# Patient Record
Sex: FEMALE | Race: WHITE | Employment: OTHER | URBAN - METROPOLITAN AREA
[De-identification: names, ages, dates, MRNs, and addresses within clinical notes are randomized per-mention and may not be internally consistent; named-entity substitution may affect disease eponyms.]

---

## 2017-08-28 PROBLEM — I10 ESSENTIAL HYPERTENSION: Status: ACTIVE | Noted: 2017-08-28

## 2017-08-28 PROBLEM — E78.2 MIXED HYPERLIPIDEMIA: Status: ACTIVE | Noted: 2017-08-28

## 2017-08-28 PROBLEM — I25.10 CAD (CORONARY ARTERY DISEASE): Status: ACTIVE | Noted: 2017-08-28

## 2018-08-28 ENCOUNTER — OFFICE VISIT (OUTPATIENT)
Dept: CARDIOLOGY CLINIC | Age: 73
End: 2018-08-28
Payer: MEDICARE

## 2018-08-28 VITALS
WEIGHT: 232 LBS | HEART RATE: 89 BPM | HEIGHT: 66 IN | DIASTOLIC BLOOD PRESSURE: 68 MMHG | SYSTOLIC BLOOD PRESSURE: 132 MMHG | RESPIRATION RATE: 16 BRPM | BODY MASS INDEX: 37.28 KG/M2

## 2018-08-28 DIAGNOSIS — I25.10 CORONARY ARTERY DISEASE INVOLVING NATIVE CORONARY ARTERY OF NATIVE HEART WITHOUT ANGINA PECTORIS: Primary | ICD-10-CM

## 2018-08-28 PROCEDURE — G8598 ASA/ANTIPLAT THER USED: HCPCS | Performed by: INTERNAL MEDICINE

## 2018-08-28 PROCEDURE — 1123F ACP DISCUSS/DSCN MKR DOCD: CPT | Performed by: INTERNAL MEDICINE

## 2018-08-28 PROCEDURE — G8427 DOCREV CUR MEDS BY ELIG CLIN: HCPCS | Performed by: INTERNAL MEDICINE

## 2018-08-28 PROCEDURE — 1036F TOBACCO NON-USER: CPT | Performed by: INTERNAL MEDICINE

## 2018-08-28 PROCEDURE — 93000 ELECTROCARDIOGRAM COMPLETE: CPT | Performed by: INTERNAL MEDICINE

## 2018-08-28 PROCEDURE — 3017F COLORECTAL CA SCREEN DOC REV: CPT | Performed by: INTERNAL MEDICINE

## 2018-08-28 PROCEDURE — 1101F PT FALLS ASSESS-DOCD LE1/YR: CPT | Performed by: INTERNAL MEDICINE

## 2018-08-28 PROCEDURE — 4040F PNEUMOC VAC/ADMIN/RCVD: CPT | Performed by: INTERNAL MEDICINE

## 2018-08-28 PROCEDURE — G8417 CALC BMI ABV UP PARAM F/U: HCPCS | Performed by: INTERNAL MEDICINE

## 2018-08-28 PROCEDURE — G8400 PT W/DXA NO RESULTS DOC: HCPCS | Performed by: INTERNAL MEDICINE

## 2018-08-28 PROCEDURE — 1090F PRES/ABSN URINE INCON ASSESS: CPT | Performed by: INTERNAL MEDICINE

## 2018-08-28 PROCEDURE — 99214 OFFICE O/P EST MOD 30 MIN: CPT | Performed by: INTERNAL MEDICINE

## 2018-08-28 RX ORDER — PREDNISONE 1 MG/1
5 TABLET ORAL PRN
COMMUNITY

## 2018-08-28 RX ORDER — LEFLUNOMIDE 20 MG/1
20 TABLET ORAL
COMMUNITY
Start: 2018-06-15 | End: 2028-09-26

## 2018-08-28 RX ORDER — FOLIC ACID 1 MG/1
1 TABLET ORAL
COMMUNITY
Start: 2018-06-15 | End: 2028-09-26

## 2018-08-28 RX ORDER — ASPIRIN 325 MG
325 TABLET, DELAYED RELEASE (ENTERIC COATED) ORAL DAILY
COMMUNITY

## 2018-08-28 NOTE — PROGRESS NOTES
No current facility-administered medications for this visit. Allergies   Allergen Reactions    Ace Inhibitors      cough    Plavix [Clopidogrel] Hives       Vitals:    08/28/18 0831   BP: 132/68   Pulse: 89   Resp: 16   Weight: 232 lb (105.2 kg)   Height: 5' 6\" (1.676 m)       Social History     Social History    Marital status:      Spouse name: N/A    Number of children: N/A    Years of education: N/A     Occupational History    Not on file. Social History Main Topics    Smoking status: Never Smoker    Smokeless tobacco: Never Used    Alcohol use No    Drug use: No    Sexual activity: Not on file     Other Topics Concern    Not on file     Social History Narrative    No narrative on file       Family History   Problem Relation Age of Onset    Cancer Mother         Pancreas    Heart Disease Father     Heart Disease Sister     Stroke Brother     Other Brother         Alcohol    Heart Disease Sister          SUBJECTIVE: Nixon Blanco presents to the office today for re-evaluation of cardiac diagnoses. She complains of no symptoms and denies   chest pain, chest pressure/discomfort, claudication, dyspnea, exertional chest pressure/discomfort, fatigue, irregular heart beat, lower extremity edema, near-syncope, orthopnea, palpitations, paroxysmal nocturnal dyspnea, syncope and tachypnea. Very troubled by arthritis, was actually on prednisone. [place] rheum opinion placed her on arava. Much better  Compliant with meds. No bleeding        Review of Systems:   Heart: as above   Lungs: as above   Eyes: denies changes in vision or discharge. Ears: denies changes in hearing or pain. Nose: denies epistaxis or masses   Throat: denies sore throat or trouble swallowing. Neuro: denies numbness, tingling, tremors. Skin: denies rashes or itching. : denies hematuria, dysuria   GI: denies vomiting, diarrhea   Psych: denies mood changed, anxiety, depression.   all others

## 2019-09-26 ENCOUNTER — OFFICE VISIT (OUTPATIENT)
Dept: CARDIOLOGY CLINIC | Age: 74
End: 2019-09-26
Payer: MEDICARE

## 2019-09-26 VITALS
HEART RATE: 89 BPM | SYSTOLIC BLOOD PRESSURE: 126 MMHG | WEIGHT: 190 LBS | BODY MASS INDEX: 30.53 KG/M2 | HEIGHT: 66 IN | RESPIRATION RATE: 16 BRPM | DIASTOLIC BLOOD PRESSURE: 70 MMHG

## 2019-09-26 DIAGNOSIS — I25.10 CORONARY ARTERY DISEASE INVOLVING NATIVE CORONARY ARTERY OF NATIVE HEART WITHOUT ANGINA PECTORIS: Primary | ICD-10-CM

## 2019-09-26 PROCEDURE — G8427 DOCREV CUR MEDS BY ELIG CLIN: HCPCS | Performed by: INTERNAL MEDICINE

## 2019-09-26 PROCEDURE — 4040F PNEUMOC VAC/ADMIN/RCVD: CPT | Performed by: INTERNAL MEDICINE

## 2019-09-26 PROCEDURE — 1123F ACP DISCUSS/DSCN MKR DOCD: CPT | Performed by: INTERNAL MEDICINE

## 2019-09-26 PROCEDURE — 1036F TOBACCO NON-USER: CPT | Performed by: INTERNAL MEDICINE

## 2019-09-26 PROCEDURE — 1090F PRES/ABSN URINE INCON ASSESS: CPT | Performed by: INTERNAL MEDICINE

## 2019-09-26 PROCEDURE — G8598 ASA/ANTIPLAT THER USED: HCPCS | Performed by: INTERNAL MEDICINE

## 2019-09-26 PROCEDURE — G8417 CALC BMI ABV UP PARAM F/U: HCPCS | Performed by: INTERNAL MEDICINE

## 2019-09-26 PROCEDURE — 93000 ELECTROCARDIOGRAM COMPLETE: CPT | Performed by: INTERNAL MEDICINE

## 2019-09-26 PROCEDURE — 99213 OFFICE O/P EST LOW 20 MIN: CPT | Performed by: INTERNAL MEDICINE

## 2019-09-26 PROCEDURE — 3017F COLORECTAL CA SCREEN DOC REV: CPT | Performed by: INTERNAL MEDICINE

## 2019-09-26 PROCEDURE — G8400 PT W/DXA NO RESULTS DOC: HCPCS | Performed by: INTERNAL MEDICINE

## 2019-09-26 NOTE — PROGRESS NOTES
re-evaluation of cardiac diagnoses. She complains of no symptoms and denies   chest pain, chest pressure/discomfort, claudication, dyspnea, exertional chest pressure/discomfort, fatigue, irregular heart beat, lower extremity edema, near-syncope, orthopnea, palpitations, paroxysmal nocturnal dyspnea, syncope and tachypnea. [place] rheum opinion placed her on arava for her RA . Much better  Compliant with meds. No bleeding        Review of Systems:   Heart: as above   Lungs: as above   Eyes: denies changes in vision or discharge. Ears: denies changes in hearing or pain. Nose: denies epistaxis or masses   Throat: denies sore throat or trouble swallowing. Neuro: denies numbness, tingling, tremors. Skin: denies rashes or itching. : denies hematuria, dysuria   GI: denies vomiting, diarrhea   Psych: denies mood changed, anxiety, depression. all others negative. Physical Exam   /70   Pulse 89   Resp 16   Ht 5' 6\" (1.676 m)   Wt 190 lb (86.2 kg)   BMI 30.67 kg/m²   Constitutional: Oriented to person, place, and time. Well-developed and well-nourished. No distress. Head: Normocephalic and atraumatic. Eyes: EOM are normal. Pupils are equal, round, and reactive to light. Neck: Normal range of motion. Neck supple. No hepatojugular reflux and no JVD present. Carotid bruit is not present. No tracheal deviation present. No thyromegaly present. Cardiovascular: Normal rate, regular rhythm, normal heart sounds and intact distal pulses. Exam reveals no gallop and no friction rub. No murmur heard. Pulmonary/Chest: Effort normal and breath sounds normal. No respiratory distress. No wheezes. No rales. No tenderness. Abdominal: Soft. Bowel sounds are normal. No distension and no mass. No tenderness. No rebound and no guarding. Musculoskeletal: Normal range of motion. No edema and no tenderness. Lymphadenopathy:   No cervical adenopathy. No groin adenopathy.   Neurological: Alert and

## 2020-01-21 ENCOUNTER — TELEPHONE (OUTPATIENT)
Dept: ADMINISTRATIVE | Age: 75
End: 2020-01-21

## 2020-01-22 NOTE — TELEPHONE ENCOUNTER
LVM requesting patient call me back-advised in VM that apt is no longer available. When patient calls back will offer 1/23/2020 @ 1130 - time held while waiting for call back.

## 2020-02-12 ENCOUNTER — OFFICE VISIT (OUTPATIENT)
Dept: ENT CLINIC | Age: 75
End: 2020-02-12
Payer: MEDICARE

## 2020-02-12 ENCOUNTER — PROCEDURE VISIT (OUTPATIENT)
Dept: AUDIOLOGY | Age: 75
End: 2020-02-12
Payer: MEDICARE

## 2020-02-12 VITALS
HEIGHT: 66 IN | DIASTOLIC BLOOD PRESSURE: 67 MMHG | BODY MASS INDEX: 28.93 KG/M2 | SYSTOLIC BLOOD PRESSURE: 119 MMHG | HEART RATE: 80 BPM | OXYGEN SATURATION: 100 % | WEIGHT: 180 LBS

## 2020-02-12 PROCEDURE — G8400 PT W/DXA NO RESULTS DOC: HCPCS | Performed by: OTOLARYNGOLOGY

## 2020-02-12 PROCEDURE — G8417 CALC BMI ABV UP PARAM F/U: HCPCS | Performed by: OTOLARYNGOLOGY

## 2020-02-12 PROCEDURE — 3017F COLORECTAL CA SCREEN DOC REV: CPT | Performed by: OTOLARYNGOLOGY

## 2020-02-12 PROCEDURE — 92557 COMPREHENSIVE HEARING TEST: CPT | Performed by: AUDIOLOGIST

## 2020-02-12 PROCEDURE — G8484 FLU IMMUNIZE NO ADMIN: HCPCS | Performed by: OTOLARYNGOLOGY

## 2020-02-12 PROCEDURE — 1090F PRES/ABSN URINE INCON ASSESS: CPT | Performed by: OTOLARYNGOLOGY

## 2020-02-12 PROCEDURE — 92567 TYMPANOMETRY: CPT | Performed by: AUDIOLOGIST

## 2020-02-12 PROCEDURE — G8427 DOCREV CUR MEDS BY ELIG CLIN: HCPCS | Performed by: OTOLARYNGOLOGY

## 2020-02-12 PROCEDURE — 99204 OFFICE O/P NEW MOD 45 MIN: CPT | Performed by: OTOLARYNGOLOGY

## 2020-02-12 PROCEDURE — 1123F ACP DISCUSS/DSCN MKR DOCD: CPT | Performed by: OTOLARYNGOLOGY

## 2020-02-12 PROCEDURE — 1036F TOBACCO NON-USER: CPT | Performed by: OTOLARYNGOLOGY

## 2020-02-12 PROCEDURE — 4040F PNEUMOC VAC/ADMIN/RCVD: CPT | Performed by: OTOLARYNGOLOGY

## 2020-02-12 ASSESSMENT — ENCOUNTER SYMPTOMS
ABDOMINAL PAIN: 0
COLOR CHANGE: 0
SHORTNESS OF BREATH: 0
GASTROINTESTINAL NEGATIVE: 1
EYES NEGATIVE: 1
RESPIRATORY NEGATIVE: 1

## 2020-02-12 NOTE — PROGRESS NOTES
This patient was referred for audiometric/tympanometric testing by Dr. Barabara Leyden due to bilateral hearing loss. Patient denied tinnitus. Patient denied any ear pain, pressure, and drainage. Audiometry revealed hearing sensitivity within normal limits through 4000 Hz sloping to a moderate sensorineural hearing loss, on the right and hearing sensitivity within normal limits through 4000 Hz sloping to a moderately severe sensorineural hearing loss, with a mild notch at 2000 Hz, on the left. Reliability was good. Speech reception thresholds were in good agreement with the pure tone averages, bilaterally. Speech discrimination scores were excellent, bilaterally. Tympanometry revealed shallow tympanograms, bilaterally. The results were reviewed with the patient and physician. Recommendations for follow up will be made pending physician consult. Wyatt Stahl. Daisha 10 Year Audiology Extern    I have discussed the case, including pertinent history and exam findings with the audiology extern. I have seen and examined the patient and the key elements of the encounter have been performed by me. I agree with the assessment and plan as documented by the audiology extern.       Neelima Benavidez/CCC-A  [place] Lic # [id number]

## 2020-02-12 NOTE — PROGRESS NOTES
Subjective:      Patient ID:  Nighat Saini is a 69 y.o. female. HPI:    Cerumen Impaction  Patient presents with diminished hearing both ears for the past 6 month. There is not a prior history of cerumen impaction. The patient was not using ear drops to loosen wax immediately prior to this visit. Hearing Loss  Patient presents today with complaints of hearing loss. Concern regarding hearing has been present for 3 months. She has not failed a prior hearing test.  The patient reports difficulty hearing, turning up the T.V..           Patient's medications, allergies, past medical, surgical, social and family histories were reviewed and updated as appropriate. Review of Systems   Constitutional: Negative. HENT: Positive for hearing loss. Eyes: Negative. Negative for visual disturbance. Respiratory: Negative. Negative for shortness of breath. Cardiovascular: Negative. Negative for chest pain. Gastrointestinal: Negative. Negative for abdominal pain. Genitourinary: Negative. Musculoskeletal: Negative. Skin: Negative. Negative for color change. Neurological: Negative. Psychiatric/Behavioral: Negative. Negative for behavioral problems and hallucinations. All other systems reviewed and are negative. Objective:   Physical Exam  Vitals signs and nursing note reviewed. Constitutional:       Appearance: She is well-developed. HENT:      Head: Normocephalic and atraumatic. Right Ear: Tympanic membrane, ear canal and external ear normal. Decreased hearing noted. Left Ear: Tympanic membrane, ear canal and external ear normal. Decreased hearing noted. Nose: Nose normal.      Mouth/Throat:      Pharynx: Uvula midline. Eyes:      Conjunctiva/sclera: Conjunctivae normal.      Pupils: Pupils are equal, round, and reactive to light. Neck:      Musculoskeletal: Normal range of motion and neck supple.    Cardiovascular:      Rate and Rhythm: Normal rate and regular rhythm. Heart sounds: Normal heart sounds. Pulmonary:      Effort: Pulmonary effort is normal.      Breath sounds: Normal breath sounds. Abdominal:      General: Bowel sounds are normal.      Palpations: Abdomen is soft. Skin:     General: Skin is warm and dry. Neurological:      Mental Status: She is alert and oriented to person, place, and time. Audio:                       Assessment:       Diagnosis Orders   1. Bilateral impacted cerumen     2. Bilateral high frequency sensorineural hearing loss                Plan:      Hearing is normal.    Pt does not have hearing aids at this time. Pt is not a candidate for hearing aids and is not interested in discussing this with audiology. Also discussed the importance of hearing protection from now on to preserve remaining hearing.      Call or return to clinic prn if these symptoms worsen or fail to improve as anticipated      Follow up prn

## 2020-05-26 ENCOUNTER — PREP FOR PROCEDURE (OUTPATIENT)
Dept: SURGERY | Age: 75
End: 2020-05-26

## 2020-05-26 RX ORDER — SODIUM CHLORIDE 0.9 % (FLUSH) 0.9 %
10 SYRINGE (ML) INJECTION PRN
Status: CANCELLED | OUTPATIENT
Start: 2020-05-26

## 2020-05-26 RX ORDER — SODIUM CHLORIDE 9 MG/ML
INJECTION, SOLUTION INTRAVENOUS CONTINUOUS
Status: CANCELLED | OUTPATIENT
Start: 2020-05-26

## 2020-07-17 ENCOUNTER — HOSPITAL ENCOUNTER (OUTPATIENT)
Age: 75
Discharge: HOME | End: 2020-07-19
Payer: MEDICARE

## 2020-07-17 PROCEDURE — U0003 INFECTIOUS AGENT DETECTION BY NUCLEIC ACID (DNA OR RNA); SEVERE ACUTE RESPIRATORY SYNDROME CORONAVIRUS 2 (SARS-COV-2) (CORONAVIRUS DISEASE [COVID-19]), AMPLIFIED PROBE TECHNIQUE, MAKING USE OF HIGH THROUGHPUT TECHNOLOGIES AS DESCRIBED BY CMS-2020-01-R: HCPCS

## 2020-07-19 LAB
SARS-COV-2: NOT DETECTED
SOURCE: NORMAL

## 2020-07-23 ENCOUNTER — ANESTHESIA (OUTPATIENT)
Dept: ENDOSCOPY | Age: 75
End: 2020-07-23
Payer: MEDICARE

## 2020-07-23 ENCOUNTER — HOSPITAL ENCOUNTER (OUTPATIENT)
Age: 75
Setting detail: OUTPATIENT SURGERY
Discharge: HOME | End: 2020-07-23
Attending: SURGERY | Admitting: SURGERY
Payer: MEDICARE

## 2020-07-23 ENCOUNTER — ANESTHESIA EVENT (OUTPATIENT)
Dept: ENDOSCOPY | Age: 75
End: 2020-07-23
Payer: MEDICARE

## 2020-07-23 VITALS
RESPIRATION RATE: 17 BRPM | SYSTOLIC BLOOD PRESSURE: 84 MMHG | DIASTOLIC BLOOD PRESSURE: 54 MMHG | OXYGEN SATURATION: 98 %

## 2020-07-23 VITALS
OXYGEN SATURATION: 100 % | TEMPERATURE: 98 F | HEIGHT: 66 IN | HEART RATE: 83 BPM | SYSTOLIC BLOOD PRESSURE: 123 MMHG | RESPIRATION RATE: 21 BRPM | BODY MASS INDEX: 29.73 KG/M2 | DIASTOLIC BLOOD PRESSURE: 60 MMHG | WEIGHT: 185 LBS

## 2020-07-23 PROCEDURE — 7100000011 HC PHASE II RECOVERY - ADDTL 15 MIN: Performed by: SURGERY

## 2020-07-23 PROCEDURE — 3700000001 HC ADD 15 MINUTES (ANESTHESIA): Performed by: SURGERY

## 2020-07-23 PROCEDURE — 2709999900 HC NON-CHARGEABLE SUPPLY: Performed by: SURGERY

## 2020-07-23 PROCEDURE — 6360000002 HC RX W HCPCS

## 2020-07-23 PROCEDURE — 7100000010 HC PHASE II RECOVERY - FIRST 15 MIN: Performed by: SURGERY

## 2020-07-23 PROCEDURE — 2580000003 HC RX 258: Performed by: SURGERY

## 2020-07-23 PROCEDURE — 3700000000 HC ANESTHESIA ATTENDED CARE: Performed by: SURGERY

## 2020-07-23 PROCEDURE — 2500000003 HC RX 250 WO HCPCS

## 2020-07-23 PROCEDURE — 3609027000 HC COLONOSCOPY: Performed by: SURGERY

## 2020-07-23 RX ORDER — PROPOFOL 10 MG/ML
INJECTION, EMULSION INTRAVENOUS PRN
Status: DISCONTINUED | OUTPATIENT
Start: 2020-07-23 | End: 2020-07-23 | Stop reason: SDUPTHER

## 2020-07-23 RX ORDER — SODIUM CHLORIDE 0.9 % (FLUSH) 0.9 %
10 SYRINGE (ML) INJECTION PRN
Status: DISCONTINUED | OUTPATIENT
Start: 2020-07-23 | End: 2020-07-23 | Stop reason: HOSPADM

## 2020-07-23 RX ORDER — SODIUM CHLORIDE 9 MG/ML
INJECTION, SOLUTION INTRAVENOUS CONTINUOUS
Status: DISCONTINUED | OUTPATIENT
Start: 2020-07-23 | End: 2020-07-23 | Stop reason: HOSPADM

## 2020-07-23 RX ORDER — ALFENTANIL HYDROCHLORIDE 500 UG/ML
INJECTION INTRAVENOUS PRN
Status: DISCONTINUED | OUTPATIENT
Start: 2020-07-23 | End: 2020-07-23 | Stop reason: SDUPTHER

## 2020-07-23 RX ADMIN — PROPOFOL 20 MG: 10 INJECTION, EMULSION INTRAVENOUS at 08:23

## 2020-07-23 RX ADMIN — SODIUM CHLORIDE: 9 INJECTION, SOLUTION INTRAVENOUS at 08:12

## 2020-07-23 RX ADMIN — PROPOFOL 20 MG: 10 INJECTION, EMULSION INTRAVENOUS at 08:18

## 2020-07-23 RX ADMIN — PROPOFOL 50 MG: 10 INJECTION, EMULSION INTRAVENOUS at 08:14

## 2020-07-23 RX ADMIN — ALFENTANIL HYDROCHLORIDE 400 MCG: 500 INJECTION INTRAVENOUS at 08:12

## 2020-07-23 ASSESSMENT — PAIN SCALES - GENERAL
PAINLEVEL_OUTOF10: 0

## 2020-07-23 ASSESSMENT — PAIN DESCRIPTION - LOCATION: LOCATION: ABDOMEN

## 2020-07-23 ASSESSMENT — PAIN - FUNCTIONAL ASSESSMENT: PAIN_FUNCTIONAL_ASSESSMENT: 0-10

## 2020-07-23 NOTE — ANESTHESIA PRE PROCEDURE
Department of Anesthesiology  Preprocedure Note       Name:  Miriam Haskins   Age:  69 y.o.  :  1945                                          MRN:  [id number]         Date:  2020      Surgeon: Camryn Lozoya):  Charlie Moore MD    Procedure: Procedure(s):  COLORECTAL CANCER SCREENING, NOT HIGH RISK    Medications prior to admission:   Prior to Admission medications    Medication Sig Start Date End Date Taking?  Authorizing Provider   David Ivann Oil (MAXIMUM RED KRILL PO) Take by mouth STOP PREOP MED    Historical Provider, MD   Calcium Carbonate-Vitamin D (CALCIUM 600+D PO) Take by mouth STOP PREOP MED    Historical Provider, MD   Cetirizine HCl (ZYRTEC PO) Take by mouth every evening    Historical Provider, MD   aspirin 325 MG EC tablet Take 325 mg by mouth daily STOP PREOP MED per dr Karl Mccabe Provider, MD   leflunomide (ARAVA) 10 MG tablet Take 20 mg by mouth three times a week STOP PREOP MED 6/15/18 9/26/28  Historical Provider, MD   predniSONE (DELTASONE) 10 MG tablet Take 5 mg by mouth four times a week     Historical Provider, MD   folic acid (FOLVITE) 1 MG tablet Take 1 mg by mouth 6/15/18 9/26/28  Historical Provider, MD   losartan (COZAAR) 50 MG tablet Take 50 mg by mouth every evening  17   Historical Provider, MD   rosuvastatin (CRESTOR) 10 MG tablet Take 1 tablet by mouth nightly 16   Lucia Almendarez MD   levothyroxine (SYNTHROID) 25 MCG tablet Take 25 mcg by mouth Daily Indications: every other day    Historical Provider, MD   ferrous sulfate 325 (65 FE) MG tablet Take 325 mg by mouth 2 times daily     Historical Provider, MD   vitamin D (CHOLECALCIFEROL) 59657 UNIT CAPS Take 50,000 Units by mouth every 14 days Indications: every 2 weeks     Historical Provider, MD   metoprolol (TOPROL-XL) 50 MG XL tablet Take 50 mg by mouth daily    Historical Provider, MD   Coenzyme Q10 (CO Q-10) 400 MG CAPS Take 400 mg by mouth    Historical Provider, MD   Multiple Vitamins-Minerals (CENTRUM SILVER PO) Take 1 tablet by mouth    Historical Provider, MD   Multiple Vitamins-Minerals (OCUVITE PO) Take 1 tablet by mouth    Historical Provider, MD       Current medications:    No current facility-administered medications for this visit. No current outpatient medications on file. Facility-Administered Medications Ordered in Other Visits   Medication Dose Route Frequency Provider Last Rate Last Dose    0.9 % sodium chloride infusion   Intravenous Continuous Hudson Santamaria MD   Stopped at 07/23/20 0831    sodium chloride flush 0.9 % injection 10 mL  10 mL Intravenous PRN Hudson Santamaria MD           Allergies: Allergies   Allergen Reactions    Plavix [Clopidogrel] Hives    Ace Inhibitors      cough       Problem List:    Patient Active Problem List   Diagnosis Code    CAD (coronary artery disease) I25.10    Essential hypertension I10    Mixed hyperlipidemia E78.2       Past Medical History:        Diagnosis Date    CAD (coronary artery disease)     Colon polyps     Osteoarthritis     Thyroid disease        Past Surgical History:        Procedure Laterality Date    ABDOMEN SURGERY      CARDIAC SURGERY      COLONOSCOPY      CORONARY ANGIOPLASTY WITH STENT PLACEMENT  06/03/2016    Dr. Palmer Collier - 3.5x38 [place] ROSAMARIA to Prox RCA       Social History:    Social History     Tobacco Use    Smoking status: Never Smoker    Smokeless tobacco: Never Used   Substance Use Topics    Alcohol use: No                                Counseling given: Not Answered      Vital Signs (Current): There were no vitals filed for this visit.                                            BP Readings from Last 3 Encounters:   07/23/20 102/61   07/23/20 (!) 84/54   02/12/20 119/67       NPO Status:                                                                                 BMI:   Wt Readings from Last 3 Encounters:   07/23/20 185 lb (83.9 kg)   02/12/20 180 lb (81.6 kg)   09/26/19 190 lb (86.2 kg) There is no height or weight on file to calculate BMI.    CBC:   Lab Results   Component Value Date    WBC 8.0 06/02/2016    RBC 4.53 06/02/2016    HGB 12.3 06/02/2016    HCT 37.8 06/02/2016    MCV 83.4 06/02/2016    RDW 15.2 06/02/2016     06/02/2016       CMP:   Lab Results   Component Value Date     06/04/2016    K 3.8 06/04/2016     06/04/2016    CO2 27 06/04/2016    BUN 16 06/04/2016    CREATININE 1.0 06/04/2016    GFRAA >60 06/04/2016    LABGLOM 55 06/04/2016    GLUCOSE 118 06/04/2016    PROT 7.3 06/02/2016    CALCIUM 9.1 06/04/2016    BILITOT 0.4 06/02/2016    ALKPHOS 69 06/02/2016    AST 30 06/02/2016    ALT 23 06/02/2016       POC Tests: No results for input(s): POCGLU, POCNA, POCK, POCCL, POCBUN, POCHEMO, POCHCT in the last 72 hours. Coags:   Lab Results   Component Value Date    PROTIME 14.0 06/02/2016    INR 1.3 06/02/2016    APTT 30.5 06/02/2016       HCG (If Applicable): No results found for: PREGTESTUR, PREGSERUM, HCG, HCGQUANT     ABGs: No results found for: PHART, PO2ART, EEH0VEB, WOI3SIK, BEART, E1GFWUBA     Type & Screen (If Applicable):  No results found for: LABABO, LABRH    Drug/Infectious Status (If Applicable):  No results found for: HIV, HEPCAB    COVID-19 Screening (If Applicable):   Lab Results   Component Value Date    COVID19 Not Detected 07/17/2020         Anesthesia Evaluation  Patient summary reviewed  Airway: Mallampati: Unable to assess / NA        Dental:          Pulmonary:Negative Pulmonary ROS and normal exam  breath sounds clear to auscultation                             Cardiovascular:    (+) hypertension:, CAD:, CABG/stent (Stent.):,       ECG reviewed  Rhythm: regular  Rate: normal           Beta Blocker:  Not on Beta Blocker      ROS comment: EKG: Normal Sinus Rhythm 89.     CATH:  Post op diagnosis:  NSTEMI  PCI OF RCA  PATENT LAD STENT     Plan:  DAPT - ALLERGIC TO PLAVIX BY REPORT  CLAIMS INTOLERANCE TO MULTIPLE STATINS     Neuro/Psych:   Negative Neuro/Psych ROS              GI/Hepatic/Renal:            ROS comment: Colon polyps. .   Endo/Other:    (+) hypothyroidism: arthritis: OA., .                 Abdominal:           Vascular: negative vascular ROS. Anesthesia Plan      MAC     ASA 3       Induction: intravenous. Anesthetic plan and risks discussed with patient. Plan discussed with CRNA.     Attending anesthesiologist reviewed and agrees with Lucy Roque MD   7/23/2020

## 2020-07-23 NOTE — ANESTHESIA PRE PROCEDURE
Department of Anesthesiology  Preprocedure Note       Name:  Sumeet Sadler   Age:  69 y.o.  :  1945                                          MRN:  [id number]         Date:  2020      Surgeon: Kristian Dempsey):  Ya Ponce MD    Procedure: Procedure(s):  COLORECTAL CANCER SCREENING, NOT HIGH RISK    Medications prior to admission:   Prior to Admission medications    Medication Sig Start Date End Date Taking?  Authorizing Provider   Kiara Oka Oil (MAXIMUM RED KRILL PO) Take by mouth STOP PREOP MED   Yes Historical Provider, MD   Calcium Carbonate-Vitamin D (CALCIUM 600+D PO) Take by mouth STOP PREOP MED   Yes Historical Provider, MD   Cetirizine HCl (ZYRTEC PO) Take by mouth every evening   Yes Historical Provider, MD   aspirin 325 MG EC tablet Take 325 mg by mouth daily STOP PREOP MED per dr Tammy Sofia   Yes Historical Provider, MD   leflunomide (ARAVA) 10 MG tablet Take 20 mg by mouth three times a week STOP PREOP MED 6/15/18 9/26/28 Yes Historical Provider, MD   predniSONE (DELTASONE) 10 MG tablet Take 5 mg by mouth four times a week    Yes Historical Provider, MD   folic acid (FOLVITE) 1 MG tablet Take 1 mg by mouth 6/15/18 9/26/28 Yes Historical Provider, MD   losartan (COZAAR) 50 MG tablet Take 50 mg by mouth every evening  17  Yes Historical Provider, MD   rosuvastatin (CRESTOR) 10 MG tablet Take 1 tablet by mouth nightly 16  Yes Lorna Barton MD   levothyroxine (SYNTHROID) 25 MCG tablet Take 25 mcg by mouth Daily Indications: every other day   Yes Historical Provider, MD   ferrous sulfate 325 (65 FE) MG tablet Take 325 mg by mouth 2 times daily    Yes Historical Provider, MD   vitamin D (CHOLECALCIFEROL) 43934 UNIT CAPS Take 50,000 Units by mouth every 14 days Indications: every 2 weeks    Yes Historical Provider, MD   metoprolol (TOPROL-XL) 50 MG XL tablet Take 50 mg by mouth daily   Yes Historical Provider, MD   Coenzyme Q10 (CO Q-10) 400 MG CAPS Take 400 mg by mouth   Yes Historical Provider, MD   Multiple Vitamins-Minerals (CENTRUM SILVER PO) Take 1 tablet by mouth   Yes Historical Provider, MD   Multiple Vitamins-Minerals (OCUVITE PO) Take 1 tablet by mouth   Yes Historical Provider, MD       Current medications:    Current Facility-Administered Medications   Medication Dose Route Frequency Provider Last Rate Last Dose    0.9 % sodium chloride infusion   Intravenous Continuous Raad Hernandez MD        sodium chloride flush 0.9 % injection 10 mL  10 mL Intravenous PRN Raad Hernandez MD           Allergies: Allergies   Allergen Reactions    Plavix [Clopidogrel] Hives    Ace Inhibitors      cough       Problem List:    Patient Active Problem List   Diagnosis Code    CAD (coronary artery disease) I25.10    Essential hypertension I10    Mixed hyperlipidemia E78.2       Past Medical History:        Diagnosis Date    CAD (coronary artery disease)     Colon polyps     Osteoarthritis     Thyroid disease        Past Surgical History:        Procedure Laterality Date    ABDOMEN SURGERY      CARDIAC SURGERY      COLONOSCOPY      CORONARY ANGIOPLASTY WITH STENT PLACEMENT  06/03/2016    Dr. Lore Aragon - 3.5x38 [place] ROSAMARIA to Prox RCA       Social History:    Social History     Tobacco Use    Smoking status: Never Smoker    Smokeless tobacco: Never Used   Substance Use Topics    Alcohol use:  No                                Counseling given: Not Answered      Vital Signs (Current):   Vitals:    07/15/20 1233 07/23/20 0742   BP:  129/63   Pulse:  93   Resp:  20   Temp:  97.4 °F (36.3 °C)   TempSrc:  Temporal   SpO2:  100%   Weight: 185 lb (83.9 kg) 185 lb (83.9 kg)   Height: 5' 6\" (1.676 m) 5' 6\" (1.676 m)                                              BP Readings from Last 3 Encounters:   07/23/20 129/63   02/12/20 119/67   09/26/19 126/70       NPO Status:                                                                                 BMI:   Wt Readings from Last 3 Encounters: 07/23/20 185 lb (83.9 kg)   02/12/20 180 lb (81.6 kg)   09/26/19 190 lb (86.2 kg)     Body mass index is 29.86 kg/m². CBC:   Lab Results   Component Value Date    WBC 8.0 06/02/2016    RBC 4.53 06/02/2016    HGB 12.3 06/02/2016    HCT 37.8 06/02/2016    MCV 83.4 06/02/2016    RDW 15.2 06/02/2016     06/02/2016       CMP:   Lab Results   Component Value Date     06/04/2016    K 3.8 06/04/2016     06/04/2016    CO2 27 06/04/2016    BUN 16 06/04/2016    CREATININE 1.0 06/04/2016    GFRAA >60 06/04/2016    LABGLOM 55 06/04/2016    GLUCOSE 118 06/04/2016    PROT 7.3 06/02/2016    CALCIUM 9.1 06/04/2016    BILITOT 0.4 06/02/2016    ALKPHOS 69 06/02/2016    AST 30 06/02/2016    ALT 23 06/02/2016       POC Tests: No results for input(s): POCGLU, POCNA, POCK, POCCL, POCBUN, POCHEMO, POCHCT in the last 72 hours. Coags:   Lab Results   Component Value Date    PROTIME 14.0 06/02/2016    INR 1.3 06/02/2016    APTT 30.5 06/02/2016       HCG (If Applicable): No results found for: PREGTESTUR, PREGSERUM, HCG, HCGQUANT     ABGs: No results found for: PHART, PO2ART, HIZ4KHW, SMF7TBE, BEART, F1MIZRAH     Type & Screen (If Applicable):  No results found for: LABABO, LABRH    Drug/Infectious Status (If Applicable):  No results found for: HIV, HEPCAB    COVID-19 Screening (If Applicable):   Lab Results   Component Value Date    COVID19 Not Detected 07/17/2020         Anesthesia Evaluation  Patient summary reviewed  Airway:         Dental:          Pulmonary:Negative Pulmonary ROS                              Cardiovascular:    (+) hypertension:, CAD:, CABG/stent (Stent.):,       ECG reviewed               Beta Blocker:  Not on Beta Blocker      ROS comment: EKG: Normal Sinus Rhythm 89.     CATH:  Post op diagnosis:  NSTEMI  PCI OF RCA  PATENT LAD STENT     Plan:  DAPT - ALLERGIC TO PLAVIX BY REPORT  CLAIMS INTOLERANCE TO MULTIPLE STATINS     Neuro/Psych:   Negative Neuro/Psych ROS              GI/Hepatic/Renal: ROS comment: Colon polyps. .   Endo/Other:    (+) hypothyroidism: arthritis: OA., .                 Abdominal:           Vascular: negative vascular ROS. Anesthesia Plan      MAC     ASA 3       Induction: intravenous. Anesthetic plan and risks discussed with patient. Plan discussed with CRNA.     Attending anesthesiologist reviewed and agrees with Reyna Parsons MD   7/23/2020

## 2020-07-23 NOTE — PROGRESS NOTES
PATIENT AGREES TO COVID TESTING TO BE DONE 7/17/20 @ [address] [place] [address]. AGREES TO SELF QUARANTINE UNTIL PROCEDURE DATE.
Pre op instructions given and post op expectations discussed. Patient verbalizes understanding. Pt had COVID test on 07/17/2020  The result is not detected  Pt denies any new signs or symptoms. The screening questionnaire was completed.  See documentation
the morning of surgery with 1-2 ounces of water:   [x] Stop herbal supplements and vitamins 5 days before your surgery. [] DO NOT take any diabetic medicine the morning of surgery. Follow instructions for insulin the day before surgery. [] If you are diabetic and your blood sugar is low or you feel symptomatic, you may drink 1-2 ounces of apple juice or take a glucose tablet. The morning of your procedure, you may call the pre-op area if you have concerns about your blood sugar [phone]. [] Use your inhalers the morning of surgery. Bring your emergency inhaler with you day of surgery. [x] Follow physician instructions regarding any blood thinners you may be taking. asa 325 mg  WHAT TO EXPECT:  [x] The day of your procedure you will be greeted and checked in by the [facility].  In addition, you will be registered in the [facility] by a Patient Access Representative. Please bring your photo ID and insurance card. A nurse will greet you in accordance to the time you are needed in the pre-op area to prepare you for surgery. Please do not be discouraged if you are not greeted in the order you arrive as there are many variables that are involved in patient preparation. Your patience is greatly appreciated as you wait for your nurse. Please bring in items such as: books, magazines, newspapers, electronics, or any other items  to occupy your time in the waiting area. []  Delays may occur. Staff will make a sincere effort to keep you informed of delays. If any delays occur with your procedure, we apologize ahead of time for your inconvenience as we recognize the value of your time.

## 2020-07-23 NOTE — ANESTHESIA POSTPROCEDURE EVALUATION
Department of Anesthesiology  Postprocedure Note    Patient: Raquel Yoo  MRN: [id number]  YOB: 1945  Date of evaluation: 7/23/2020  Time:  8:54 AM     Procedure Summary     Date:  07/23/20 Room / Location:  [address] [place] [address]    Anesthesia Start:  1088 Anesthesia Stop:  7058    Procedure:  COLORECTAL CANCER SCREENING, NOT HIGH RISK (N/A ) Diagnosis:  (SCREENING)    Surgeon:  Saravanan Galloway MD Responsible Provider:  Rboinson Peoples MD    Anesthesia Type:  MAC ASA Status:  3          Anesthesia Type: MAC    Cate Phase I: Cate Score: 10    Cate Phase II: Cate Score: 10    Last vitals: Reviewed and per EMR flowsheets.        Anesthesia Post Evaluation    Patient location during evaluation: PACU  Patient participation: complete - patient participated  Level of consciousness: awake  Pain score: 0  Airway patency: patent  Nausea & Vomiting: no nausea  Complications: no  Cardiovascular status: blood pressure returned to baseline  Respiratory status: acceptable  Hydration status: euvolemic

## 2020-09-28 ENCOUNTER — OFFICE VISIT (OUTPATIENT)
Dept: CARDIOLOGY CLINIC | Age: 75
End: 2020-09-28
Payer: MEDICARE

## 2020-09-28 VITALS
BODY MASS INDEX: 30.52 KG/M2 | HEART RATE: 85 BPM | RESPIRATION RATE: 18 BRPM | HEIGHT: 66 IN | SYSTOLIC BLOOD PRESSURE: 128 MMHG | DIASTOLIC BLOOD PRESSURE: 68 MMHG | WEIGHT: 189.9 LBS

## 2020-09-28 PROCEDURE — G8427 DOCREV CUR MEDS BY ELIG CLIN: HCPCS | Performed by: INTERNAL MEDICINE

## 2020-09-28 PROCEDURE — 99213 OFFICE O/P EST LOW 20 MIN: CPT | Performed by: INTERNAL MEDICINE

## 2020-09-28 PROCEDURE — 3017F COLORECTAL CA SCREEN DOC REV: CPT | Performed by: INTERNAL MEDICINE

## 2020-09-28 PROCEDURE — 93000 ELECTROCARDIOGRAM COMPLETE: CPT | Performed by: INTERNAL MEDICINE

## 2020-09-28 PROCEDURE — 1090F PRES/ABSN URINE INCON ASSESS: CPT | Performed by: INTERNAL MEDICINE

## 2020-09-28 PROCEDURE — G8400 PT W/DXA NO RESULTS DOC: HCPCS | Performed by: INTERNAL MEDICINE

## 2020-09-28 PROCEDURE — 4040F PNEUMOC VAC/ADMIN/RCVD: CPT | Performed by: INTERNAL MEDICINE

## 2020-09-28 PROCEDURE — G8417 CALC BMI ABV UP PARAM F/U: HCPCS | Performed by: INTERNAL MEDICINE

## 2020-09-28 PROCEDURE — 1036F TOBACCO NON-USER: CPT | Performed by: INTERNAL MEDICINE

## 2020-09-28 PROCEDURE — 1123F ACP DISCUSS/DSCN MKR DOCD: CPT | Performed by: INTERNAL MEDICINE

## 2020-09-28 NOTE — PROGRESS NOTES
09/28/20 0727   BP: 128/68   Pulse: 85   Resp: 18   Weight: 189 lb 14.4 oz (86.1 kg)   Height: 5' 6\" (1.676 m)       Social History     Socioeconomic History    Marital status:      Spouse name: Not on file    Number of children: Not on file    Years of education: Not on file    Highest education level: Not on file   Occupational History    Not on file   Social Needs    Financial resource strain: Not on file    Food insecurity     Worry: Not on file     Inability: Not on file    Transportation needs     Medical: Not on file     Non-medical: Not on file   Tobacco Use    Smoking status: Never Smoker    Smokeless tobacco: Never Used   Substance and Sexual Activity    Alcohol use: No    Drug use: No    Sexual activity: Not on file   Lifestyle    Physical activity     Days per week: Not on file     Minutes per session: Not on file    Stress: Not on file   Relationships    Social connections     Talks on phone: Not on file     Gets together: Not on file     Attends Scientology service: Not on file     Active member of club or organization: Not on file     Attends meetings of clubs or organizations: Not on file     Relationship status: Not on file    Intimate partner violence     Fear of current or ex partner: Not on file     Emotionally abused: Not on file     Physically abused: Not on file     Forced sexual activity: Not on file   Other Topics Concern    Not on file   Social History Narrative    Not on file       Family History   Problem Relation Age of Onset    Cancer Mother         Pancreas    Heart Disease Father     Heart Disease Sister     Stroke Brother     Other Brother         Alcohol    Heart Disease Sister          SUBJECTIVE: Winifred Winter presents to the office today for re-evaluation of cardiac diagnoses.   She complains of no symptoms and denies   chest pain, chest pressure/discomfort, claudication, dyspnea, exertional chest pressure/discomfort, fatigue, irregular heart beat, lower extremity edema, near-syncope, orthopnea, palpitations, paroxysmal nocturnal dyspnea, syncope and tachypnea. [place] rheum opinion placed her on arava for her RA . Much better  Compliant with meds. No bleeding. Lost her  a year ago. Does walk with he sourav for exercise . Review of Systems:   Heart: as above   Lungs: as above   Eyes: denies changes in vision or discharge. Ears: denies changes in hearing or pain. Nose: denies epistaxis or masses   Throat: denies sore throat or trouble swallowing. Neuro: denies numbness, tingling, tremors. Skin: denies rashes or itching. : denies hematuria, dysuria   GI: denies vomiting, diarrhea   Psych: denies mood changed, anxiety, depression. all others negative. Physical Exam   /68   Pulse 85   Resp 18   Ht 5' 6\" (1.676 m)   Wt 189 lb 14.4 oz (86.1 kg)   BMI 30.65 kg/m²   Constitutional: Oriented to person, place, and time. Well-developed and well-nourished. No distress. Head: Normocephalic and atraumatic. Eyes: EOM are normal. Pupils are equal, round, and reactive to light. Neck: Normal range of motion. Neck supple. No hepatojugular reflux and no JVD present. Carotid bruit is not present. No tracheal deviation present. No thyromegaly present. Cardiovascular: Normal rate, regular rhythm, normal heart sounds and intact distal pulses. Exam reveals no gallop and no friction rub. No murmur heard. Pulmonary/Chest: Effort normal and breath sounds normal. No respiratory distress. No wheezes. No rales. No tenderness. Abdominal: Soft. Bowel sounds are normal. No distension and no mass. No tenderness. No rebound and no guarding. Musculoskeletal: Normal range of motion. No edema and no tenderness. Neurological: Alert and oriented to person, place, and time. Skin: Skin is warm and dry. No rash noted. Not diaphoretic. No erythema. Psychiatric: Normal mood and affect.  Behavior is normal.     EKG:  normal EKG, normal sinus rhythm. ASSESSMENT AND PLAN:  Patient Active Problem List   Diagnosis    CAD (coronary artery disease): remote LAD stenting and PCI of RCA > 3 years ago. No recurrent symptoms.  Essential hypertension: at target    Mixed hyperlipidemia: target LDL < 70: on high intensity statin        The patient was educated as to the symptoms that would require a prompt return to our office. Return visit in 1 year.     Harish Gagnon M.D  [id number] [facility]

## 2021-03-02 ENCOUNTER — IMMUNIZATION (OUTPATIENT)
Dept: PRIMARY CARE CLINIC | Age: 76
End: 2021-03-02
Payer: MEDICARE

## 2021-03-02 PROCEDURE — 91300 COVID-19, PFIZER VACCINE 30MCG/0.3ML DOSE: CPT | Performed by: PHYSICIAN ASSISTANT

## 2021-03-02 PROCEDURE — 0001A COVID-19, PFIZER VACCINE 30MCG/0.3ML DOSE: CPT | Performed by: PHYSICIAN ASSISTANT

## 2021-03-30 ENCOUNTER — IMMUNIZATION (OUTPATIENT)
Dept: PRIMARY CARE CLINIC | Age: 76
End: 2021-03-30
Payer: MEDICARE

## 2021-03-30 PROCEDURE — 0002A COVID-19, PFIZER VACCINE 30MCG/0.3ML DOSE: CPT | Performed by: PHYSICIAN ASSISTANT

## 2021-03-30 PROCEDURE — 91300 COVID-19, PFIZER VACCINE 30MCG/0.3ML DOSE: CPT | Performed by: PHYSICIAN ASSISTANT

## 2021-09-28 ENCOUNTER — OFFICE VISIT (OUTPATIENT)
Dept: CARDIOLOGY CLINIC | Age: 76
End: 2021-09-28
Payer: MEDICARE

## 2021-09-28 VITALS
HEART RATE: 81 BPM | BODY MASS INDEX: 31.12 KG/M2 | RESPIRATION RATE: 18 BRPM | SYSTOLIC BLOOD PRESSURE: 125 MMHG | DIASTOLIC BLOOD PRESSURE: 69 MMHG | HEIGHT: 66 IN | WEIGHT: 193.6 LBS

## 2021-09-28 DIAGNOSIS — I25.10 CORONARY ARTERY DISEASE INVOLVING NATIVE CORONARY ARTERY OF NATIVE HEART WITHOUT ANGINA PECTORIS: Primary | ICD-10-CM

## 2021-09-28 PROCEDURE — G8427 DOCREV CUR MEDS BY ELIG CLIN: HCPCS | Performed by: INTERNAL MEDICINE

## 2021-09-28 PROCEDURE — 1123F ACP DISCUSS/DSCN MKR DOCD: CPT | Performed by: INTERNAL MEDICINE

## 2021-09-28 PROCEDURE — G8417 CALC BMI ABV UP PARAM F/U: HCPCS | Performed by: INTERNAL MEDICINE

## 2021-09-28 PROCEDURE — 4040F PNEUMOC VAC/ADMIN/RCVD: CPT | Performed by: INTERNAL MEDICINE

## 2021-09-28 PROCEDURE — 1090F PRES/ABSN URINE INCON ASSESS: CPT | Performed by: INTERNAL MEDICINE

## 2021-09-28 PROCEDURE — 93000 ELECTROCARDIOGRAM COMPLETE: CPT | Performed by: INTERNAL MEDICINE

## 2021-09-28 PROCEDURE — 99213 OFFICE O/P EST LOW 20 MIN: CPT | Performed by: INTERNAL MEDICINE

## 2021-09-28 PROCEDURE — 3017F COLORECTAL CA SCREEN DOC REV: CPT | Performed by: INTERNAL MEDICINE

## 2021-09-28 PROCEDURE — G8400 PT W/DXA NO RESULTS DOC: HCPCS | Performed by: INTERNAL MEDICINE

## 2021-09-28 PROCEDURE — 1036F TOBACCO NON-USER: CPT | Performed by: INTERNAL MEDICINE

## 2021-09-28 NOTE — PROGRESS NOTES
Chief Complaint   Patient presents with    Coronary Artery Disease       Patient Active Problem List    Diagnosis Date Noted    CAD (coronary artery disease) 08/28/2017     Overview Note:     A. Hx of remote LAD stenting - details unknown  B. NSTEMI  6/3/2016:   [place] ROSAMARIA 3.5 X 38 to RCA. Mild ISR in LAD stent. EF 45%      Essential hypertension 08/28/2017    Mixed hyperlipidemia 08/28/2017       Current Outpatient Medications   Medication Sig Dispense Refill    Krill Oil (MAXIMUM RED KRILL PO) Take by mouth STOP PREOP MED      Calcium Carbonate-Vitamin D (CALCIUM 600+D PO) Take by mouth STOP PREOP MED      Cetirizine HCl (ZYRTEC PO) Take by mouth every evening      aspirin 325 MG EC tablet Take 325 mg by mouth daily STOP PREOP MED per dr Lazarus Aguilar      leflunomide (ARAVA) 20 MG tablet Take 20 mg by mouth three times a week STOP PREOP MED      predniSONE (DELTASONE) 5 MG tablet Take 5 mg by mouth daily       folic acid (FOLVITE) 1 MG tablet Take 1 mg by mouth      losartan (COZAAR) 50 MG tablet Take 50 mg by mouth every evening       rosuvastatin (CRESTOR) 10 MG tablet Take 1 tablet by mouth nightly 30 tablet 0    levothyroxine (SYNTHROID) 25 MCG tablet Take 25 mcg by mouth Daily Indications: every other day      ferrous sulfate 325 (65 FE) MG tablet Take 325 mg by mouth daily (with breakfast)       vitamin D (CHOLECALCIFEROL) 06175 UNIT CAPS Take 50,000 Units by mouth every 14 days Indications: every 2 weeks       metoprolol (TOPROL-XL) 50 MG XL tablet Take 50 mg by mouth daily      Coenzyme Q10 (CO Q-10) 400 MG CAPS Take 400 mg by mouth      Multiple Vitamins-Minerals (CENTRUM SILVER PO) Take 1 tablet by mouth      Multiple Vitamins-Minerals (OCUVITE PO) Take 1 tablet by mouth       No current facility-administered medications for this visit.         Allergies   Allergen Reactions    Plavix [Clopidogrel] Hives    Ace Inhibitors      cough       Vitals:    09/28/21 0705   BP: 125/69   Pulse: 81 Resp: 18   Weight: 193 lb 9.6 oz (87.8 kg)   Height: 5' 6\" (1.676 m)       Social History     Socioeconomic History    Marital status:      Spouse name: Not on file    Number of children: Not on file    Years of education: Not on file    Highest education level: Not on file   Occupational History    Not on file   Tobacco Use    Smoking status: Never Smoker    Smokeless tobacco: Never Used   Vaping Use    Vaping Use: Never used   Substance and Sexual Activity    Alcohol use: No    Drug use: No    Sexual activity: Not on file   Other Topics Concern    Not on file   Social History Narrative    Not on file     Social Determinants of Health     Financial Resource Strain:     Difficulty of Paying Living Expenses:    Food Insecurity:     Worried About Running Out of Food in the Last Year:     [address] in the Last Year:    Transportation Needs:     Lack of Transportation (Medical):  Lack of Transportation (Non-Medical):    Physical Activity:     Days of Exercise per Week:     Minutes of Exercise per Session:    Stress:     Feeling of Stress :    Social Connections:     Frequency of Communication with Friends and Family:     Frequency of Social Gatherings with Friends and Family:     Attends Sikh Services:     Active Member of Clubs or Organizations:     Attends Club or Organization Meetings:     Marital Status:    Intimate Partner Violence:     Fear of Current or Ex-Partner:     Emotionally Abused:     Physically Abused:     Sexually Abused:        Family History   Problem Relation Age of Onset    Cancer Mother         Pancreas    Heart Disease Father     Heart Disease Sister     Stroke Brother     Other Brother         Alcohol    Heart Disease Sister          SUBJECTIVE: Peggy Apodaca presents to the office today for re-evaluation of cardiac diagnoses.   She complains of no symptoms and denies   chest pain, chest pressure/discomfort, claudication, dyspnea, exertional chest pressure/discomfort, fatigue, irregular heart beat, lower extremity edema, near-syncope, orthopnea, palpitations, paroxysmal nocturnal dyspnea, syncope and tachypnea. [place] rheum opinion placed her on arava for her RA . Much better  Compliant with meds. No bleeding. Lives with sister and brother in law - does all the cooking and cares for her sister who is immobile. Non smoker. Review of Systems:   Heart: as above   Lungs: as above   Eyes: denies changes in vision or discharge. Ears: denies changes in hearing or pain. Nose: denies epistaxis or masses   Throat: denies sore throat or trouble swallowing. Neuro: denies numbness, tingling, tremors. Skin: denies rashes or itching. : denies hematuria, dysuria   GI: denies vomiting, diarrhea   Psych: denies mood changed, anxiety, depression. all others negative. Physical Exam   /69   Pulse 81   Resp 18   Ht 5' 6\" (1.676 m)   Wt 193 lb 9.6 oz (87.8 kg)   BMI 31.25 kg/m²   Constitutional: Oriented to person, place, and time. Overweight  No distress. Head: Normocephalic and atraumatic. Eyes: EOM are normal. Pupils are equal, round, and reactive to light. Neck: Normal range of motion. Neck supple. No hepatojugular reflux and no JVD present. Carotid bruit is not present. No tracheal deviation present. No thyromegaly present. Cardiovascular: Normal rate, regular rhythm, normal heart sounds and intact distal pulses. Exam reveals no gallop and no friction rub. No murmur heard. Pulmonary/Chest: Effort normal and breath sounds normal. No respiratory distress. No wheezes. No rales. No tenderness. Abdominal: Soft. Bowel sounds are normal. No distension and no mass. No tenderness. No rebound and no guarding. Musculoskeletal: Normal range of motion. No edema and no tenderness. Neurological: Alert and oriented to person, place, and time. Skin: Skin is warm and dry. No rash noted. Not diaphoretic.  No erythema. Psychiatric: Normal mood and affect. Behavior is normal.     EKG:  normal EKG, normal sinus rhythm, rate 81, axis  +47    ASSESSMENT AND PLAN:  Patient Active Problem List   Diagnosis    CAD (coronary artery disease): remote LAD stenting and PCI of RCA > 3 years ago. No recurrent symptoms.  Essential hypertension: at target    Mixed hyperlipidemia: target LDL < 70: on high intensity statin        The patient was educated as to the symptoms that would require a prompt return to our office. Return visit in 1 year.     Lorena Spears M.D  [address] [facility]

## 2022-01-27 ENCOUNTER — APPOINTMENT (OUTPATIENT)
Dept: GENERAL RADIOLOGY | Age: 77
DRG: 177 | End: 2022-01-27
Payer: MEDICARE

## 2022-01-27 ENCOUNTER — HOSPITAL ENCOUNTER (INPATIENT)
Age: 77
LOS: 4 days | Discharge: HOME | DRG: 177 | End: 2022-01-31
Attending: EMERGENCY MEDICINE | Admitting: INTERNAL MEDICINE
Payer: MEDICARE

## 2022-01-27 ENCOUNTER — APPOINTMENT (OUTPATIENT)
Dept: CT IMAGING | Age: 77
DRG: 177 | End: 2022-01-27
Payer: MEDICARE

## 2022-01-27 DIAGNOSIS — R93.89 ABNORMAL CHEST CT: ICD-10-CM

## 2022-01-27 DIAGNOSIS — J96.01 ACUTE RESPIRATORY FAILURE WITH HYPOXIA (HCC): Primary | ICD-10-CM

## 2022-01-27 PROBLEM — U07.1 ACUTE HYPOXEMIC RESPIRATORY FAILURE DUE TO COVID-19 (HCC): Status: ACTIVE | Noted: 2022-01-27

## 2022-01-27 LAB
ALBUMIN SERPL-MCNC: 3.5 G/DL (ref 3.5–5.2)
ALP BLD-CCNC: 78 U/L (ref 35–104)
ALT SERPL-CCNC: 19 U/L (ref 0–32)
ANION GAP SERPL CALCULATED.3IONS-SCNC: 16 MMOL/L (ref 7–16)
AST SERPL-CCNC: 30 U/L (ref 0–31)
BASOPHILS ABSOLUTE: 0.01 E9/L (ref 0–0.2)
BASOPHILS RELATIVE PERCENT: 0.2 % (ref 0–2)
BILIRUB SERPL-MCNC: 1.5 MG/DL (ref 0–1.2)
BUN BLDV-MCNC: 17 MG/DL (ref 6–23)
C-REACTIVE PROTEIN: 11.5 MG/DL (ref 0–0.4)
CALCIUM SERPL-MCNC: 9.1 MG/DL (ref 8.6–10.2)
CHLORIDE BLD-SCNC: 96 MMOL/L (ref 98–107)
CO2: 21 MMOL/L (ref 22–29)
CREAT SERPL-MCNC: 1 MG/DL (ref 0.5–1)
D DIMER: 602 NG/ML DDU
EKG ATRIAL RATE: 134 BPM
EKG P AXIS: 57 DEGREES
EKG P-R INTERVAL: 140 MS
EKG Q-T INTERVAL: 306 MS
EKG QRS DURATION: 84 MS
EKG QTC CALCULATION (BAZETT): 456 MS
EKG R AXIS: 13 DEGREES
EKG T AXIS: 58 DEGREES
EKG VENTRICULAR RATE: 134 BPM
EOSINOPHILS ABSOLUTE: 0.01 E9/L (ref 0.05–0.5)
EOSINOPHILS RELATIVE PERCENT: 0.2 % (ref 0–6)
GFR AFRICAN AMERICAN: >60
GFR NON-AFRICAN AMERICAN: 54 ML/MIN/1.73
GLUCOSE BLD-MCNC: 100 MG/DL (ref 74–99)
HCT VFR BLD CALC: 42.3 % (ref 34–48)
HEMOGLOBIN: 13 G/DL (ref 11.5–15.5)
IMMATURE GRANULOCYTES #: 0.03 E9/L
IMMATURE GRANULOCYTES %: 0.5 % (ref 0–5)
LACTIC ACID: 1.9 MMOL/L (ref 0.5–2.2)
LYMPHOCYTES ABSOLUTE: 0.56 E9/L (ref 1.5–4)
LYMPHOCYTES RELATIVE PERCENT: 9.4 % (ref 20–42)
MCH RBC QN AUTO: 27.7 PG (ref 26–35)
MCHC RBC AUTO-ENTMCNC: 30.7 % (ref 32–34.5)
MCV RBC AUTO: 90.2 FL (ref 80–99.9)
MONOCYTES ABSOLUTE: 0.54 E9/L (ref 0.1–0.95)
MONOCYTES RELATIVE PERCENT: 9 % (ref 2–12)
NEUTROPHILS ABSOLUTE: 4.82 E9/L (ref 1.8–7.3)
NEUTROPHILS RELATIVE PERCENT: 80.7 % (ref 43–80)
OVALOCYTES: ABNORMAL
PDW BLD-RTO: 15.7 FL (ref 11.5–15)
PLATELET # BLD: 80 E9/L (ref 130–450)
PLATELET CONFIRMATION: NORMAL
PMV BLD AUTO: ABNORMAL FL (ref 7–12)
POIKILOCYTES: ABNORMAL
POLYCHROMASIA: ABNORMAL
POTASSIUM REFLEX MAGNESIUM: 4 MMOL/L (ref 3.5–5)
PRO-BNP: 348 PG/ML (ref 0–450)
RBC # BLD: 4.69 E12/L (ref 3.5–5.5)
SCHISTOCYTES: ABNORMAL
SEDIMENTATION RATE, ERYTHROCYTE: 45 MM/HR (ref 0–20)
SODIUM BLD-SCNC: 133 MMOL/L (ref 132–146)
TOTAL PROTEIN: 7 G/DL (ref 6.4–8.3)
TROPONIN, HIGH SENSITIVITY: 17 NG/L (ref 0–9)
WBC # BLD: 6 E9/L (ref 4.5–11.5)

## 2022-01-27 PROCEDURE — 2500000003 HC RX 250 WO HCPCS: Performed by: INTERNAL MEDICINE

## 2022-01-27 PROCEDURE — 2580000003 HC RX 258: Performed by: INTERNAL MEDICINE

## 2022-01-27 PROCEDURE — 86140 C-REACTIVE PROTEIN: CPT

## 2022-01-27 PROCEDURE — 85378 FIBRIN DEGRADE SEMIQUANT: CPT

## 2022-01-27 PROCEDURE — 85025 COMPLETE CBC W/AUTO DIFF WBC: CPT

## 2022-01-27 PROCEDURE — 83605 ASSAY OF LACTIC ACID: CPT

## 2022-01-27 PROCEDURE — 93005 ELECTROCARDIOGRAM TRACING: CPT | Performed by: EMERGENCY MEDICINE

## 2022-01-27 PROCEDURE — 6360000004 HC RX CONTRAST MEDICATION: Performed by: RADIOLOGY

## 2022-01-27 PROCEDURE — 2580000003 HC RX 258: Performed by: EMERGENCY MEDICINE

## 2022-01-27 PROCEDURE — 2700000000 HC OXYGEN THERAPY PER DAY

## 2022-01-27 PROCEDURE — 2060000000 HC ICU INTERMEDIATE R&B

## 2022-01-27 PROCEDURE — 85651 RBC SED RATE NONAUTOMATED: CPT

## 2022-01-27 PROCEDURE — 99285 EMERGENCY DEPT VISIT HI MDM: CPT

## 2022-01-27 PROCEDURE — 84484 ASSAY OF TROPONIN QUANT: CPT

## 2022-01-27 PROCEDURE — 6360000002 HC RX W HCPCS: Performed by: INTERNAL MEDICINE

## 2022-01-27 PROCEDURE — 6360000002 HC RX W HCPCS: Performed by: EMERGENCY MEDICINE

## 2022-01-27 PROCEDURE — 71045 X-RAY EXAM CHEST 1 VIEW: CPT

## 2022-01-27 PROCEDURE — 6370000000 HC RX 637 (ALT 250 FOR IP): Performed by: INTERNAL MEDICINE

## 2022-01-27 PROCEDURE — 84145 PROCALCITONIN (PCT): CPT

## 2022-01-27 PROCEDURE — 36415 COLL VENOUS BLD VENIPUNCTURE: CPT

## 2022-01-27 PROCEDURE — 71275 CT ANGIOGRAPHY CHEST: CPT

## 2022-01-27 PROCEDURE — 80053 COMPREHEN METABOLIC PANEL: CPT

## 2022-01-27 PROCEDURE — 83880 ASSAY OF NATRIURETIC PEPTIDE: CPT

## 2022-01-27 RX ORDER — LOSARTAN POTASSIUM 50 MG/1
50 TABLET ORAL EVERY EVENING
Status: DISCONTINUED | OUTPATIENT
Start: 2022-01-27 | End: 2022-01-31 | Stop reason: HOSPADM

## 2022-01-27 RX ORDER — LEVOTHYROXINE SODIUM 0.03 MG/1
25 TABLET ORAL DAILY
Status: DISCONTINUED | OUTPATIENT
Start: 2022-01-28 | End: 2022-01-31 | Stop reason: HOSPADM

## 2022-01-27 RX ORDER — 0.9 % SODIUM CHLORIDE 0.9 %
1000 INTRAVENOUS SOLUTION INTRAVENOUS ONCE
Status: COMPLETED | OUTPATIENT
Start: 2022-01-27 | End: 2022-01-27

## 2022-01-27 RX ORDER — ROSUVASTATIN CALCIUM 10 MG/1
10 TABLET, COATED ORAL NIGHTLY
Status: DISCONTINUED | OUTPATIENT
Start: 2022-01-27 | End: 2022-01-31 | Stop reason: HOSPADM

## 2022-01-27 RX ORDER — ACETAMINOPHEN 650 MG/1
650 SUPPOSITORY RECTAL EVERY 6 HOURS PRN
Status: DISCONTINUED | OUTPATIENT
Start: 2022-01-27 | End: 2022-01-31 | Stop reason: HOSPADM

## 2022-01-27 RX ORDER — FERROUS SULFATE 325(65) MG
325 TABLET ORAL
Status: DISCONTINUED | OUTPATIENT
Start: 2022-01-28 | End: 2022-01-31 | Stop reason: HOSPADM

## 2022-01-27 RX ORDER — LEFLUNOMIDE 20 MG/1
20 TABLET ORAL
Status: DISCONTINUED | OUTPATIENT
Start: 2022-01-28 | End: 2022-01-31 | Stop reason: HOSPADM

## 2022-01-27 RX ORDER — ACETAMINOPHEN 325 MG/1
650 TABLET ORAL EVERY 6 HOURS PRN
Status: DISCONTINUED | OUTPATIENT
Start: 2022-01-27 | End: 2022-01-31 | Stop reason: HOSPADM

## 2022-01-27 RX ORDER — 0.9 % SODIUM CHLORIDE 0.9 %
500 INTRAVENOUS SOLUTION INTRAVENOUS ONCE
Status: COMPLETED | OUTPATIENT
Start: 2022-01-27 | End: 2022-01-27

## 2022-01-27 RX ORDER — DEXAMETHASONE SODIUM PHOSPHATE 10 MG/ML
10 INJECTION INTRAMUSCULAR; INTRAVENOUS ONCE
Status: COMPLETED | OUTPATIENT
Start: 2022-01-27 | End: 2022-01-27

## 2022-01-27 RX ORDER — LOPERAMIDE HYDROCHLORIDE 2 MG/1
2 CAPSULE ORAL 4 TIMES DAILY PRN
COMMUNITY

## 2022-01-27 RX ORDER — VITAMIN B COMPLEX
2000 TABLET ORAL DAILY
Status: DISCONTINUED | OUTPATIENT
Start: 2022-01-28 | End: 2022-01-31 | Stop reason: HOSPADM

## 2022-01-27 RX ORDER — 0.9 % SODIUM CHLORIDE 0.9 %
30 INTRAVENOUS SOLUTION INTRAVENOUS PRN
Status: DISCONTINUED | OUTPATIENT
Start: 2022-01-27 | End: 2022-01-31 | Stop reason: HOSPADM

## 2022-01-27 RX ORDER — METOPROLOL SUCCINATE 50 MG/1
50 TABLET, EXTENDED RELEASE ORAL DAILY
Status: DISCONTINUED | OUTPATIENT
Start: 2022-01-28 | End: 2022-01-31 | Stop reason: HOSPADM

## 2022-01-27 RX ADMIN — REMDESIVIR 200 MG: 100 INJECTION, POWDER, LYOPHILIZED, FOR SOLUTION INTRAVENOUS at 23:29

## 2022-01-27 RX ADMIN — ROSUVASTATIN CALCIUM 10 MG: 10 TABLET, FILM COATED ORAL at 22:37

## 2022-01-27 RX ADMIN — SODIUM CHLORIDE 1000 ML: 9 INJECTION, SOLUTION INTRAVENOUS at 17:17

## 2022-01-27 RX ADMIN — LOSARTAN POTASSIUM 50 MG: 50 TABLET, FILM COATED ORAL at 22:37

## 2022-01-27 RX ADMIN — ENOXAPARIN SODIUM 30 MG: 100 INJECTION SUBCUTANEOUS at 22:37

## 2022-01-27 RX ADMIN — SODIUM CHLORIDE 1000 ML: 9 INJECTION, SOLUTION INTRAVENOUS at 13:57

## 2022-01-27 RX ADMIN — IOPAMIDOL 75 ML: 755 INJECTION, SOLUTION INTRAVENOUS at 18:17

## 2022-01-27 RX ADMIN — SODIUM CHLORIDE 500 ML: 9 INJECTION, SOLUTION INTRAVENOUS at 19:12

## 2022-01-27 RX ADMIN — DEXAMETHASONE SODIUM PHOSPHATE 10 MG: 10 INJECTION INTRAMUSCULAR; INTRAVENOUS at 20:11

## 2022-01-27 ASSESSMENT — ENCOUNTER SYMPTOMS
ABDOMINAL PAIN: 0
SPUTUM PRODUCTION: 0
EYE DISCHARGE: 0
COUGH: 1
ABDOMINAL DISTENTION: 0
EYE REDNESS: 0
VOMITING: 0
BACK PAIN: 0
SINUS PRESSURE: 0
NAUSEA: 0
DIARRHEA: 0
WHEEZING: 0
EYE PAIN: 0
SORE THROAT: 0
SHORTNESS OF BREATH: 1

## 2022-01-27 ASSESSMENT — PAIN SCALES - GENERAL
PAINLEVEL_OUTOF10: 5
PAINLEVEL_OUTOF10: 0

## 2022-01-27 ASSESSMENT — PAIN DESCRIPTION - PAIN TYPE: TYPE: ACUTE PAIN

## 2022-01-27 ASSESSMENT — PAIN DESCRIPTION - LOCATION: LOCATION: CHEST

## 2022-01-27 NOTE — ED PROVIDER NOTES
Department of Emergency Medicine    FIRST PROVIDER TRIAGE NOTE             Independent MLP           1/27/22  11:29 AM EST    Date of Encounter: 1/27/22   MRN: [id number]    Vitals:    01/27/22 1104   BP: 132/77   Pulse: 129   Resp: 16   Temp: 97.5 °F (36.4 °C)   SpO2: 95%   Weight: 184 lb (83.5 kg)   Height: 5' 6\" (1.676 m)      HPI: Cathie López is a 78 y.o. female who presents to the ED for Positive For Covid-19 (sent in by pcp for tachycardia, hr in 130's in triage, dizziness) and Shortness of Breath    ROS: Negative for abd pain. Physical Exam:   Gen Appearance/Constitutional: alert  CV: tachycardia     Initial Plan of Care: All treatment areas with department are currently occupied. Plan to order/Initiate the following while awaiting opening in ED: labs, EKG and imaging studies.     Initial Plan of Care: Initiate Treatment-Testing, Proceed toTreatment Area When Bed Available for ED Attending/MLP to Continue Care    Electronically signed by Kelli Acosta PA-C   DD: 1/27/22       Kelli Acosta PA-C  01/27/22 1129

## 2022-01-27 NOTE — ED PROVIDER NOTES
Mouth: Mucous membranes are dry. Cardiovascular:      Rate and Rhythm: Regular rhythm. Tachycardia present. Pulmonary:      Effort: Pulmonary effort is normal.      Breath sounds: Normal breath sounds. No decreased breath sounds, wheezing or rhonchi. Abdominal:      Palpations: Abdomen is soft. Musculoskeletal:         General: Normal range of motion. Cervical back: Normal range of motion. Right lower leg: No tenderness. No edema. Left lower leg: No tenderness. No edema. Skin:     General: Skin is warm. Capillary Refill: Capillary refill takes less than 2 seconds. Neurological:      General: No focal deficit present. Mental Status: She is alert and oriented to person, place, and time. Procedures   EKG: This EKG is signed and interpreted by the EP. Time: 11:15  Rate: 134  Rhythm: Sinus  Interpretation: sinus tachycardia  Comparison: changes compared to previous EKG    MDM  Number of Diagnoses or Management Options  Diagnosis management comments: Patient seen and examined. Labs and imaging ordered. Concerns are for COVID-19. CT scan of the abdomen chest was ordered labs reviewed. She remained tachycardic despite multiple liters of fluid. Patient was ambulated but did not become hypoxic however when I was jose discussed disposition with the patient I walked in the room and she was resting in bed satting 86%. CT scan revealed possible aspergillosis I discussed this with the physician on Dr. Tyler Perze who agreed to admit the patient further evaluation.        ED Course as of 01/28/22 1617   Thu Jan 27, 2022   1358 Patient was ambulated by nurse she had an increase in her heart rate from 112 to the 140s however her oxygen levels remained in the mid 90s without hypoxia [CF]      ED Course User Index  [CF] Everardo Arauz DO     --------------------------------------------- PAST HISTORY ---------------------------------------------  Past Medical History:  has a past medical history of CAD (coronary artery disease), Colon polyps, Osteoarthritis, and Thyroid disease. Past Surgical History:  has a past surgical history that includes Cardiac surgery; Abdomen surgery; Coronary angioplasty with stent (06/03/2016); Colonoscopy; and Colonoscopy (N/A, 7/23/2020). Social History:  reports that she has never smoked. She has never used smokeless tobacco. She reports that she does not drink alcohol and does not use drugs. Family History: family history includes Cancer in her mother; Heart Disease in her father, sister, and sister; Other in her brother; Stroke in her brother. The patients home medications have been reviewed.     Allergies: Plavix [clopidogrel] and Ace inhibitors    -------------------------------------------------- RESULTS -------------------------------------------------    Lab  Results for orders placed or performed during the hospital encounter of 01/27/22   CBC Auto Differential   Result Value Ref Range    WBC 6.0 4.5 - 11.5 E9/L    RBC 4.69 3.50 - 5.50 E12/L    Hemoglobin 13.0 11.5 - 15.5 g/dL    Hematocrit 42.3 34.0 - 48.0 %    MCV 90.2 80.0 - 99.9 fL    MCH 27.7 26.0 - 35.0 pg    MCHC 30.7 (L) 32.0 - 34.5 %    RDW 15.7 (H) 11.5 - 15.0 fL    Platelets 80 (L) 689 - 450 E9/L    MPV NOT CALC 7.0 - 12.0 fL    Neutrophils % 80.7 (H) 43.0 - 80.0 %    Immature Granulocytes % 0.5 0.0 - 5.0 %    Lymphocytes % 9.4 (L) 20.0 - 42.0 %    Monocytes % 9.0 2.0 - 12.0 %    Eosinophils % 0.2 0.0 - 6.0 %    Basophils % 0.2 0.0 - 2.0 %    Neutrophils Absolute 4.82 1.80 - 7.30 E9/L    Immature Granulocytes # 0.03 E9/L    Lymphocytes Absolute 0.56 (L) 1.50 - 4.00 E9/L    Monocytes Absolute 0.54 0.10 - 0.95 E9/L    Eosinophils Absolute 0.01 (L) 0.05 - 0.50 E9/L    Basophils Absolute 0.01 0.00 - 0.20 E9/L    Polychromasia 1+     Poikilocytes 1+     Schistocytes 1+     Ovalocytes 1+    Comprehensive Metabolic Panel w/ Reflex to MG   Result Value Ref Range    Sodium 133 132 - 146 mmol/L    Potassium reflex Magnesium 4.0 3.5 - 5.0 mmol/L    Chloride 96 (L) 98 - 107 mmol/L    CO2 21 (L) 22 - 29 mmol/L    Anion Gap 16 7 - 16 mmol/L    Glucose 100 (H) 74 - 99 mg/dL    BUN 17 6 - 23 mg/dL    CREATININE 1.0 0.5 - 1.0 mg/dL    GFR Non-African American 54 >=60 mL/min/1.73    GFR African American >60     Calcium 9.1 8.6 - 10.2 mg/dL    Total Protein 7.0 6.4 - 8.3 g/dL    Albumin 3.5 3.5 - 5.2 g/dL    Total Bilirubin 1.5 (H) 0.0 - 1.2 mg/dL    Alkaline Phosphatase 78 35 - 104 U/L    ALT 19 0 - 32 U/L    AST 30 0 - 31 U/L   Troponin   Result Value Ref Range    Troponin, High Sensitivity 17 (H) 0 - 9 ng/L   Sedimentation Rate   Result Value Ref Range    Sed Rate 45 (H) 0 - 20 mm/Hr   C-reactive protein   Result Value Ref Range    CRP 11.5 (H) 0.0 - 0.4 mg/dL   Brain Natriuretic Peptide   Result Value Ref Range    Pro- 0 - 450 pg/mL   Lactic Acid, Plasma   Result Value Ref Range    Lactic Acid 1.9 0.5 - 2.2 mmol/L   Platelet Confirmation   Result Value Ref Range    Platelet Confirmation CONFIRMED    CBC Auto Differential   Result Value Ref Range    WBC 3.4 (L) 4.5 - 11.5 E9/L    RBC 3.82 3.50 - 5.50 E12/L    Hemoglobin 10.7 (L) 11.5 - 15.5 g/dL    Hematocrit 35.4 34.0 - 48.0 %    MCV 92.7 80.0 - 99.9 fL    MCH 28.0 26.0 - 35.0 pg    MCHC 30.2 (L) 32.0 - 34.5 %    RDW 15.4 (H) 11.5 - 15.0 fL    Platelets 61 (L) 631 - 450 E9/L    MPV 12.5 (H) 7.0 - 12.0 fL    Neutrophils % 86.6 (H) 43.0 - 80.0 %    Immature Granulocytes % 0.3 0.0 - 5.0 %    Lymphocytes % 10.4 (L) 20.0 - 42.0 %    Monocytes % 2.7 2.0 - 12.0 %    Eosinophils % 0.0 0.0 - 6.0 %    Basophils % 0.0 0.0 - 2.0 %    Neutrophils Absolute 2.90 1.80 - 7.30 E9/L    Immature Granulocytes # 0.01 E9/L    Lymphocytes Absolute 0.35 (L) 1.50 - 4.00 E9/L    Monocytes Absolute 0.09 (L) 0.10 - 0.95 E9/L    Eosinophils Absolute 0.00 (L) 0.05 - 0.50 E9/L    Basophils Absolute 0.00 0.00 - 0.20 E9/L    Poikilocytes 1+     Ovalocytes 1+    Comprehensive Metabolic Panel w/ Reflex to MG   Result Value Ref Range    Sodium 139 132 - 146 mmol/L    Potassium reflex Magnesium 4.2 3.5 - 5.0 mmol/L    Chloride 106 98 - 107 mmol/L    CO2 22 22 - 29 mmol/L    Anion Gap 11 7 - 16 mmol/L    Glucose 193 (H) 74 - 99 mg/dL    BUN 15 6 - 23 mg/dL    CREATININE 0.9 0.5 - 1.0 mg/dL    GFR Non-African American >60 >=60 mL/min/1.73    GFR African American >60     Calcium 8.1 (L) 8.6 - 10.2 mg/dL    Total Protein 5.3 (L) 6.4 - 8.3 g/dL    Albumin 3.3 (L) 3.5 - 5.2 g/dL    Total Bilirubin 1.0 0.0 - 1.2 mg/dL    Alkaline Phosphatase 70 35 - 104 U/L    ALT 18 0 - 32 U/L    AST 27 0 - 31 U/L   Procalcitonin   Result Value Ref Range    Procalcitonin 0.09 (H) 0.00 - 0.08 ng/mL   D-Dimer, Quantitative   Result Value Ref Range    D-Dimer, Quant 602 ng/mL DDU   D-Dimer, Quantitative   Result Value Ref Range    D-Dimer, Quant 513 ng/mL DDU   Platelet Confirmation   Result Value Ref Range    Platelet Confirmation CONFIRMED    EKG 12 Lead   Result Value Ref Range    Ventricular Rate 134 BPM    Atrial Rate 134 BPM    P-R Interval 140 ms    QRS Duration 84 ms    Q-T Interval 306 ms    QTc Calculation (Bazett) 456 ms    P Axis 57 degrees    R Axis 13 degrees    T Axis 58 degrees       Radiology  XR CHEST PORTABLE    Result Date: 1/27/2022  EXAMINATION: ONE XRAY VIEW OF THE CHEST 1/27/2022 12:29 pm COMPARISON: 06/02/2016 HISTORY: ORDERING SYSTEM PROVIDED HISTORY: Shortness of breath TECHNOLOGIST PROVIDED HISTORY: Reason for exam:->Shortness of breath FINDINGS: The lungs are without acute focal process. There is no effusion or pneumothorax. The cardiomediastinal silhouette is without acute process. The osseous structures are without acute process. No acute process.      CTA PULMONARY W CONTRAST    Result Date: 1/27/2022  EXAMINATION: CTA OF THE CHEST 1/27/2022 5:32 pm TECHNIQUE: CTA of the chest was performed after the administration of intravenous contrast.  Multiplanar reformatted images are provided for review. MIP images are provided for review. Dose modulation, iterative reconstruction, and/or weight based adjustment of the mA/kV was utilized to reduce the radiation dose to as low as reasonably achievable. COMPARISON: None. HISTORY: ORDERING SYSTEM PROVIDED HISTORY: eval for PE TECHNOLOGIST PROVIDED HISTORY: Reason for exam:->eval for PE Decision Support Exception - unselect if not a suspected or confirmed emergency medical condition->Emergency Medical Condition (MA) FINDINGS: Pulmonary Arteries: Pulmonary arteries are adequately opacified for evaluation. No evidence of intraluminal filling defect to suggest pulmonary embolism. Main pulmonary artery is normal in caliber. Mediastinum: No evidence of mediastinal lymphadenopathy. The heart and pericardium demonstrate no acute abnormality. There is no acute abnormality of the thoracic aorta. Lungs/pleura: The lungs demonstrate multifocal patchy opacities mostly in the posterior lungs bilaterally. In addition there are multiple cavitating lesions measuring up to 2.1 cm at the right posterior lung base. At least 1 of these cavitating lesions contain internal nodule. No evidence of pleural effusion or pneumothorax. Upper Abdomen: Limited images of the upper abdomen are unremarkable. Soft Tissues/Bones: No acute bone or soft tissue abnormality. No evidence of pulmonary embolism. Mild multifocal patchy opacities in both lungs suspicious for atypical or COVID related pneumonia. In addition, there are multiple small cavitating lesions at the posterior lung bases, some with internal nodules which may be seen in aspergilloma. Please correlate with clinical presentation and consider pulmonary consultation if clinically appropriate.  Critical results were called by Dr. Leyla Zelaya to Dr. Yoshi Salazar On 1/27/2022 at 18:32.         ------------------------- NURSING NOTES AND VITALS REVIEWED ---------------------------  Date / Time Roomed:  1/27/2022 11:53 AM  ED Bed Assignment:  [id number]    The nursing notes within the ED encounter and vital signs as below have been reviewed. Patient Vitals for the past 24 hrs:   BP Temp Temp src Pulse Resp SpO2   01/28/22 1433 107/79 97.6 °F (36.4 °C) Oral 92 18 95 %   01/28/22 0824 -- -- -- -- -- 100 %   01/28/22 0815 (!) 141/65 97.9 °F (36.6 °C) Oral 98 18 98 %   01/28/22 0324 107/74 97.2 °F (36.2 °C) Oral 81 18 97 %   01/27/22 2156 136/60 98.1 °F (36.7 °C) Oral 107 18 95 %   01/27/22 2045 (!) 118/92 -- -- 109 18 96 %   01/27/22 1952 (!) 119/50 -- -- 108 18 99 %   01/27/22 1950 -- -- -- -- -- (!) 86 %   01/27/22 1945 (!) 119/50 -- -- 107 18 (!) 86 %   01/27/22 1913 (!) 119/58 -- -- 111 16 95 %   01/27/22 1717 135/70 -- -- 128 16 93 %       Oxygen Saturation Interpretation: Improved after treatment      ------------------------------------------ PROGRESS NOTES ------------------------------------------   have spoken with the patient and discussed todays results, in addition to providing specific details for the plan of care and counseling regarding the diagnosis and prognosis. Their questions are answered at this time and they are agreeable with the plan.      --------------------------------- ADDITIONAL PROVIDER NOTES ---------------------------------  This patient's ED course included: a personal history and physicial examination, re-evaluation prior to disposition, multiple bedside re-evaluations, IV medications, cardiac monitoring and continuous pulse oximetry    This patient has remained hemodynamically stable during their ED course. Please note that the withdrawal or failure to initiate urgent interventions for this patient would likely result in a life threatening deterioration or permanent disability. Accordingly this patient received 30 minutes of critical care time, excluding separately billable procedures. Clinical Impression  1. Acute respiratory failure with hypoxia ([address]    2.  Abnormal chest CT Disposition  Patient's disposition: Admit to telemetry  Patient's condition is fair.        Edmundo Hough DO  01/28/22 8092

## 2022-01-27 NOTE — ED NOTES
Ambulated patient with POX at this time. Resting SPO2 96% RA  and . Ambulating SPO2 95% RA and . Pt tolerated activity well. Dr Camila Paige updated.      Ning Dupree RN  01/27/22 3597

## 2022-01-28 LAB
ALBUMIN SERPL-MCNC: 3.3 G/DL (ref 3.5–5.2)
ALP BLD-CCNC: 70 U/L (ref 35–104)
ALT SERPL-CCNC: 18 U/L (ref 0–32)
ANION GAP SERPL CALCULATED.3IONS-SCNC: 11 MMOL/L (ref 7–16)
AST SERPL-CCNC: 27 U/L (ref 0–31)
BASOPHILS ABSOLUTE: 0 E9/L (ref 0–0.2)
BASOPHILS RELATIVE PERCENT: 0 % (ref 0–2)
BILIRUB SERPL-MCNC: 1 MG/DL (ref 0–1.2)
BUN BLDV-MCNC: 15 MG/DL (ref 6–23)
CALCIUM SERPL-MCNC: 8.1 MG/DL (ref 8.6–10.2)
CHLORIDE BLD-SCNC: 106 MMOL/L (ref 98–107)
CO2: 22 MMOL/L (ref 22–29)
CREAT SERPL-MCNC: 0.9 MG/DL (ref 0.5–1)
D DIMER: 513 NG/ML DDU
EOSINOPHILS ABSOLUTE: 0 E9/L (ref 0.05–0.5)
EOSINOPHILS RELATIVE PERCENT: 0 % (ref 0–6)
GFR AFRICAN AMERICAN: >60
GFR NON-AFRICAN AMERICAN: >60 ML/MIN/1.73
GLUCOSE BLD-MCNC: 193 MG/DL (ref 74–99)
HCT VFR BLD CALC: 35.4 % (ref 34–48)
HEMOGLOBIN: 10.7 G/DL (ref 11.5–15.5)
IMMATURE GRANULOCYTES #: 0.01 E9/L
IMMATURE GRANULOCYTES %: 0.3 % (ref 0–5)
LYMPHOCYTES ABSOLUTE: 0.35 E9/L (ref 1.5–4)
LYMPHOCYTES RELATIVE PERCENT: 10.4 % (ref 20–42)
MCH RBC QN AUTO: 28 PG (ref 26–35)
MCHC RBC AUTO-ENTMCNC: 30.2 % (ref 32–34.5)
MCV RBC AUTO: 92.7 FL (ref 80–99.9)
MONOCYTES ABSOLUTE: 0.09 E9/L (ref 0.1–0.95)
MONOCYTES RELATIVE PERCENT: 2.7 % (ref 2–12)
NEUTROPHILS ABSOLUTE: 2.9 E9/L (ref 1.8–7.3)
NEUTROPHILS RELATIVE PERCENT: 86.6 % (ref 43–80)
OVALOCYTES: ABNORMAL
PDW BLD-RTO: 15.4 FL (ref 11.5–15)
PLATELET # BLD: 61 E9/L (ref 130–450)
PLATELET CONFIRMATION: NORMAL
PMV BLD AUTO: 12.5 FL (ref 7–12)
POIKILOCYTES: ABNORMAL
POTASSIUM REFLEX MAGNESIUM: 4.2 MMOL/L (ref 3.5–5)
PROCALCITONIN: 0.09 NG/ML (ref 0–0.08)
RBC # BLD: 3.82 E12/L (ref 3.5–5.5)
SODIUM BLD-SCNC: 139 MMOL/L (ref 132–146)
TOTAL PROTEIN: 5.3 G/DL (ref 6.4–8.3)
WBC # BLD: 3.4 E9/L (ref 4.5–11.5)

## 2022-01-28 PROCEDURE — 2060000000 HC ICU INTERMEDIATE R&B

## 2022-01-28 PROCEDURE — 86606 ASPERGILLUS ANTIBODY: CPT

## 2022-01-28 PROCEDURE — 85378 FIBRIN DEGRADE SEMIQUANT: CPT

## 2022-01-28 PROCEDURE — 2700000000 HC OXYGEN THERAPY PER DAY

## 2022-01-28 PROCEDURE — XW033E5 INTRODUCTION OF REMDESIVIR ANTI-INFECTIVE INTO PERIPHERAL VEIN, PERCUTANEOUS APPROACH, NEW TECHNOLOGY GROUP 5: ICD-10-PCS | Performed by: INTERNAL MEDICINE

## 2022-01-28 PROCEDURE — 94664 DEMO&/EVAL PT USE INHALER: CPT

## 2022-01-28 PROCEDURE — 86403 PARTICLE AGGLUT ANTBDY SCRN: CPT

## 2022-01-28 PROCEDURE — 94640 AIRWAY INHALATION TREATMENT: CPT

## 2022-01-28 PROCEDURE — 86612 BLASTOMYCES ANTIBODY: CPT

## 2022-01-28 PROCEDURE — 6360000002 HC RX W HCPCS: Performed by: SPECIALIST

## 2022-01-28 PROCEDURE — 2580000003 HC RX 258: Performed by: SPECIALIST

## 2022-01-28 PROCEDURE — 87305 ASPERGILLUS AG IA: CPT

## 2022-01-28 PROCEDURE — 86698 HISTOPLASMA ANTIBODY: CPT

## 2022-01-28 PROCEDURE — 2580000003 HC RX 258: Performed by: NURSE PRACTITIONER

## 2022-01-28 PROCEDURE — 80053 COMPREHEN METABOLIC PANEL: CPT

## 2022-01-28 PROCEDURE — 2580000003 HC RX 258: Performed by: INTERNAL MEDICINE

## 2022-01-28 PROCEDURE — 6360000002 HC RX W HCPCS: Performed by: NURSE PRACTITIONER

## 2022-01-28 PROCEDURE — 86635 COCCIDIOIDES ANTIBODY: CPT

## 2022-01-28 PROCEDURE — 6360000002 HC RX W HCPCS: Performed by: INTERNAL MEDICINE

## 2022-01-28 PROCEDURE — 36415 COLL VENOUS BLD VENIPUNCTURE: CPT

## 2022-01-28 PROCEDURE — 6370000000 HC RX 637 (ALT 250 FOR IP): Performed by: INTERNAL MEDICINE

## 2022-01-28 PROCEDURE — 87385 HISTOPLASMA CAPSUL AG IA: CPT

## 2022-01-28 PROCEDURE — 85025 COMPLETE CBC W/AUTO DIFF WBC: CPT

## 2022-01-28 PROCEDURE — 2500000003 HC RX 250 WO HCPCS: Performed by: INTERNAL MEDICINE

## 2022-01-28 RX ORDER — SODIUM CHLORIDE FOR INHALATION 3 %
4 VIAL, NEBULIZER (ML) INHALATION 2 TIMES DAILY
Status: DISCONTINUED | OUTPATIENT
Start: 2022-01-28 | End: 2022-01-31 | Stop reason: HOSPADM

## 2022-01-28 RX ORDER — ALBUTEROL SULFATE 2.5 MG/3ML
2.5 SOLUTION RESPIRATORY (INHALATION) 4 TIMES DAILY
Status: DISCONTINUED | OUTPATIENT
Start: 2022-01-28 | End: 2022-01-31 | Stop reason: HOSPADM

## 2022-01-28 RX ADMIN — ENOXAPARIN SODIUM 30 MG: 100 INJECTION SUBCUTANEOUS at 22:18

## 2022-01-28 RX ADMIN — Medication 2000 UNITS: at 17:54

## 2022-01-28 RX ADMIN — REMDESIVIR 100 MG: 100 INJECTION, POWDER, LYOPHILIZED, FOR SOLUTION INTRAVENOUS at 22:19

## 2022-01-28 RX ADMIN — DEXAMETHASONE 6 MG: 4 TABLET ORAL at 08:33

## 2022-01-28 RX ADMIN — LOSARTAN POTASSIUM 50 MG: 50 TABLET, FILM COATED ORAL at 22:18

## 2022-01-28 RX ADMIN — DEXTROSE MONOHYDRATE 200 MG: 50 INJECTION, SOLUTION INTRAVENOUS at 17:48

## 2022-01-28 RX ADMIN — ENOXAPARIN SODIUM 30 MG: 100 INJECTION SUBCUTANEOUS at 08:33

## 2022-01-28 RX ADMIN — ROSUVASTATIN CALCIUM 10 MG: 10 TABLET, FILM COATED ORAL at 22:18

## 2022-01-28 RX ADMIN — ALBUTEROL SULFATE 2.5 MG: 2.5 SOLUTION RESPIRATORY (INHALATION) at 14:01

## 2022-01-28 RX ADMIN — LEVOTHYROXINE SODIUM 25 MCG: 25 TABLET ORAL at 05:34

## 2022-01-28 RX ADMIN — FERROUS SULFATE TAB 325 MG (65 MG ELEMENTAL FE) 325 MG: 325 (65 FE) TAB at 08:32

## 2022-01-28 RX ADMIN — SODIUM CHLORIDE SOLN NEBU 3% 4 ML: 3 NEBU SOLN at 14:01

## 2022-01-28 RX ADMIN — METOPROLOL SUCCINATE 50 MG: 50 TABLET, EXTENDED RELEASE ORAL at 08:33

## 2022-01-28 RX ADMIN — ASPIRIN 325 MG: 325 TABLET, COATED ORAL at 22:18

## 2022-01-28 RX ADMIN — LEFLUNOMIDE 20 MG: 20 TABLET ORAL at 08:32

## 2022-01-28 ASSESSMENT — PAIN SCALES - GENERAL
PAINLEVEL_OUTOF10: 0

## 2022-01-28 NOTE — ED NOTES
Handoff report given to Chinedu Ortiz RN. Care of pt relinquished at this time.      Pat Hunter RN  01/27/22 5845

## 2022-01-28 NOTE — PROGRESS NOTES
Pharmacy Consultation Note    Consult date: 1/27/2022  Provider: Louise De Jesus has been consulted to evaluate criteria for Remdesivir therapy based on the [facility] P&T approved Covid-19 treatment algorithm. Based on the algorithm, the patient DOES currently meet [facility] P&T approved Covid-19 treatment criteria for Remdesivir.     Thank you for the consult,  Karena Wall, [facility]

## 2022-01-28 NOTE — PROGRESS NOTES
Dr Sid Finnegan notified of consult via secure text. Romulo JOSUE notified of consult via the office.  Romulo gomez

## 2022-01-28 NOTE — CONSULTS
Colon polyps     Osteoarthritis     Thyroid   Rheumatoid arthritis on immunosuppressive medicatiodisease      Past Surgical History:        Procedure Laterality Date    ABDOMEN SURGERY      CARDIAC SURGERY      COLONOSCOPY      COLONOSCOPY N/A 7/23/2020    COLORECTAL CANCER SCREENING, NOT HIGH RISK performed by Armaan Roberts MD at [place] [address]  06/03/2016    Dr. Va Malloy - 3.5x38 [facility] to Prox RCA     Current Medications:   Scheduled Meds:   albuterol  2.5 mg Nebulization 4x daily    sodium chloride (Inhalant)  4 mL Nebulization BID    levothyroxine  25 mcg Oral Daily    ferrous sulfate  325 mg Oral Daily with breakfast    vitamin D  50,000 Units Oral Q14 Days    metoprolol succinate  50 mg Oral Daily    rosuvastatin  10 mg Oral Nightly    losartan  50 mg Oral QPM    aspirin  325 mg Oral Daily    leflunomide  20 mg Oral Once per day on Mon Wed Fri    enoxaparin  30 mg SubCUTAneous BID    dexamethasone  6 mg Oral Daily    Vitamin D  2,000 Units Oral Daily    remdesivir IVPB  100 mg IntraVENous Q24H     Continuous Infusions:  PRN Meds:acetaminophen **OR** acetaminophen, sodium chloride    Allergies:  Plavix [clopidogrel] and Ace inhibitors    Social History:   Social History     Socioeconomic History    Marital status:       Spouse name: None    Number of children: None    Years of education: None    Highest education level: None   Occupational History    None   Tobacco Use    Smoking status: Never Smoker    Smokeless tobacco: Never Used   Vaping Use    Vaping Use: Never used   Substance and Sexual Activity    Alcohol use: No    Drug use: No    Sexual activity: None   Other Topics Concern    None   Social History Narrative    None     Social Determinants of Health     Financial Resource Strain:     Difficulty of Paying Living Expenses: Not on file   Food Insecurity:     Worried About Running Out of Food in the Last Year: Not on file    Ran Out of Food in the Last Year: Not on file   Transportation Needs:     Lack of Transportation (Medical): Not on file    Lack of Transportation (Non-Medical): Not on file   Physical Activity:     Days of Exercise per Week: Not on file    Minutes of Exercise per Session: Not on file   Stress:     Feeling of Stress : Not on file   Social Connections:     Frequency of Communication with Friends and Family: Not on file    Frequency of Social Gatherings with Friends and Family: Not on file    Attends Voodoo Services: Not on file    Active Member of [address] [place] [address] or Organizations: Not on file    Attends Club or Organization Meetings: Not on file    Marital Status: Not on file   Intimate Partner Violence:     Fear of Current or Ex-Partner: Not on file    Emotionally Abused: Not on file    Physically Abused: Not on file    Sexually Abused: Not on file   Housing Stability:     Unable to Pay for Housing in the Last Year: Not on file    Number of [place] in the Last Year: Not on file    Unstable Housing in the Last Year: Not on file     Tobacco: No  Alcohol: No  Pets: Dog  Travel: Throughout the [place] but did spend time in [place] and has visited [facility]    Family History:       Problem Relation Age of Onset    Cancer Mother         Pancreas    Heart Disease Father     Heart Disease Sister     Stroke Brother     Other Brother         Alcohol    Heart Disease Sister    . Otherwise non-pertinent to the chief complaint. REVIEW OF SYSTEMS:    CONSTITUTIONAL:  No chills, fevers or night sweats. No loss of weight. EYES:  No double vision or drainage from eyes, ears or throat. HEENT:  No neck stiffness. No dysphagia. No drainage from eyes, ears or throat  RESPIRATORY:  No cough, productive sputum or hemoptysis. CARDIOVASCULAR:  No chest pain, palpitations, orthopnea or dyspnea on exertion.   GASTROINTESTINAL:  No nausea, vomiting, diarrhea or constipation or hematochezia GENITOURINARY:  No frequency burning dysuria or hematuria. INTEGUMENT/BREAST:  No rash or breast masses. HEMATOLOGIC/LYMPHATIC:  No lymphadenopathy or blood dyscrasics. ALLERGIC/IMMUNOLOGIC:  No anaphylaxis. ENDOCRINE:  No polyuria or polydipsia or temperature intolerance. MUSCULOSKELETAL:  No myalgia or arthralgia. Full ROM. NEUROLOGICAL:  No focal motor sensory deficit. BEHAVIOR/PSYCH:  No psychosis. PHYSICAL EXAM:    Vitals:    BP (!) 141/65   Pulse 98   Temp 97.9 °F (36.6 °C) (Oral)   Resp 18   Ht 5' 6\" (1.676 m)   Wt 184 lb (83.5 kg)   SpO2 100%   BMI 29.70 kg/m²   Constitutional: The patient is awake, alert, and oriented. Skin: Warm and dry. No rashes were noted. No jaundice. HEENT: Eyes show round, and reactive pupils. Moist mucous membranes, no ulcerations, no thrush. Neck: Supple to movements. No lymphadenopathy. Chest: No use of accessory muscles to breathe. Symmetrical expansion. Auscultation reveals no wheezing, crackles, or rhonchi. Cardiovascular: S1 and S2 are rhythmic and regular. No murmurs appreciated. Abdomen: Positive bowel sounds to auscultation. Benign to palpation. No masses felt. No hepatosplenomegaly. Genitourinary: Female  Extremities: No clubbing, no cyanosis, no edema. Musculoskeletal: Equal and symmetrical  Neurological: No focal  Lines: peripheral      CBC+dif:  Recent Labs     01/27/22  1140 01/27/22  1140 01/28/22  0332   WBC 6.0  --  3.4*   HGB 13.0   < > 10.7*   HCT 42.3   < > 35.4   MCV 90.2   < > 92.7   PLT 80*   < > 61*   NEUTROABS 4.82   < > 2.90    < > = values in this interval not displayed.      Lab Results   Component Value Date    CRP 11.5 (H) 01/27/2022     No results found for: CRPHS  Lab Results   Component Value Date    SEDRATE 45 (H) 01/27/2022     Lab Results   Component Value Date    ALT 18 01/28/2022    AST 27 01/28/2022    ALKPHOS 70 01/28/2022    BILITOT 1.0 01/28/2022     Lab Results   Component Value Date     01/28/2022 K 4.2 01/28/2022     01/28/2022    CO2 22 01/28/2022    BUN 15 01/28/2022    CREATININE 0.9 01/28/2022    GFRAA >60 01/28/2022    LABGLOM >60 01/28/2022    GLUCOSE 193 01/28/2022    PROT 5.3 01/28/2022    LABALBU 3.3 01/28/2022    CALCIUM 8.1 01/28/2022    BILITOT 1.0 01/28/2022    ALKPHOS 70 01/28/2022    AST 27 01/28/2022    ALT 18 01/28/2022       Lab Results   Component Value Date    PROTIME 14.0 06/02/2016    INR 1.3 06/02/2016       No results found for: TSH    No results found for: NITRITE, COLORU, PHUR, LABCAST, WBCUA, RBCUA, MUCUS, TRICHOMONAS, YEAST, BACTERIA, CLARITYU, SPECGRAV, LEUKOCYTESUR, UROBILINOGEN, BILIRUBINUR, BLOODU, GLUCOSEU, AMORPHOUS    No results found for: [place], BEART, H7XIAMTG, PHART, THGBART, GFT7KGQ, PO2ART, QDX4FDV  Radiology:  CTA PULMONARY W CONTRAST   Final Result   No evidence of pulmonary embolism. Mild multifocal patchy opacities in both lungs suspicious for atypical or   COVID related pneumonia. In addition, there are multiple small cavitating   lesions at the posterior lung bases, some with internal nodules which may be   seen in aspergilloma. Please correlate with clinical presentation and   consider pulmonary consultation if clinically appropriate. Critical results were called by Dr. Leyla Zelaya to Dr. Yoshi Salazar On 1/27/2022 at   18:32. XR CHEST PORTABLE   Final Result   No acute process. Microbiology:  Pending  No results for input(s): BC in the last 72 hours. No results for input(s): ORG in the last 72 hours. No results for input(s): Sunitha Si in the last 72 hours. No results for input(s): STREPNEUMAGU in the last 72 hours. No results for input(s): LP1UAG in the last 72 hours. No results for input(s): ASO in the last 72 hours. No results for input(s): CULTRESP in the last 72 hours.     Assessment:  · COVID-19 for the most part fairly asymptomatic  · Cavitary lung lesion in a patient with a rheumatoid arthritis and on immunosuppressive drugs consistent with a fungal infection and classic for aspergillosis. Plan:    · Cont remdesivir and dexamethasone but will start Eraxis while she is on the higher doses of dexamethasone  · Fungal serologies including galactomannan, Fungitell, cryptococcus antigen, histoplasmosis antigen urine and fungal serologies immunodiffusion studies  · Check cultures  · Baseline ESR, CRP  · Monitor labs  · Will follow with you    Thank you for having us see this patient in consultation. I will be discussing this case with the treating physicians.       Electronically signed by Dorothea Anderson MD on 1/28/2022 at 12:54 PM

## 2022-01-28 NOTE — PROGRESS NOTES
Completed a walking SpO2 on Patient. On Room air patient was 98%. While ambulating on room air patient's SpO2 was between %. On room air at rest patient's SpO2 was 98%. Patient reported that she didn't feel that SOB.

## 2022-01-28 NOTE — CARE COORDINATION
2022  Social Work Discharge Planning:COVID POS. SW discussed discharge planning with Pt. Pt currently has no code status. SW notified nurse. Pt resides with and is caregiver for her sister in law. Pts spouse is . Pt is on 2l o2 here and has a portable concentrator at home that goes up to 4l. Wean o2 as tolerated. Pt has no other DME. Pharmacy is [facility] and PCP is Dr. Chiquis Lucas.  Electronically signed by JL Callahan on 2022 at 9:51 AM

## 2022-01-28 NOTE — H&P
[facility]  History and Physical      CHIEF COMPLAINT: Hypoxia    Reason for Admission: COVID-19 pneumonia, hypoxia    History Obtained From: Patient    PCP :  Sara Layton MD    [facility], [address] / [facility]*      HISTORY OF PRESENT ILLNESS:      The patient is a 78 y.o. female was sent in by [facility] urgent care when she presented to get tested for Covid. She was then noted to be hypoxic. She was also noted to be tachycardic. ED evaluation revealed COVID-19 infection with acute hypoxia and pneumonia on the CAT scan of the chest.  She was then admitted for further evaluation treatment. She is feeling improved since admission. She has no significant shortness of breath. CT scan also suggested possible aspergilloma's.   She is vaccinated with 2 shots of Covid vaccine    Past Medical History:        Diagnosis Date    CAD (coronary artery disease)     Colon polyps     Osteoarthritis     Thyroid disease      Past Surgical History:        Procedure Laterality Date    ABDOMEN SURGERY      CARDIAC SURGERY      COLONOSCOPY      COLONOSCOPY N/A 7/23/2020    COLORECTAL CANCER SCREENING, NOT HIGH RISK performed by Milton Nash MD at [place] [address]  06/03/2016    Dr. Graciela Moreno - 3.5x38 [facility] to Prox RCA         Medications Prior to Admission:    Medications Prior to Admission: loperamide (IMODIUM) 2 MG capsule, Take 2 mg by mouth 4 times daily as needed for Diarrhea  Krill Oil (MAXIMUM RED KRILL PO), Take by mouth STOP PREOP MED  Calcium Carbonate-Vitamin D (CALCIUM 600+D PO), Take by mouth STOP PREOP MED  Cetirizine HCl (ZYRTEC PO), Take by mouth every evening  aspirin 325 MG EC tablet, Take 325 mg by mouth daily STOP PREOP MED per dr Yefri Alvarez  leflunomide (ARAVA) 20 MG tablet, Take 20 mg by mouth three times a week STOP PREOP MED  predniSONE (DELTASONE) 5 MG tablet, Take 5 mg by mouth four times a week Takes Tuesday, Thursday, Saturday, Sunday  folic acid (FOLVITE) 1 MG tablet, Take 1 mg by mouth  losartan (COZAAR) 50 MG tablet, Take 50 mg by mouth every evening   rosuvastatin (CRESTOR) 10 MG tablet, Take 1 tablet by mouth nightly  levothyroxine (SYNTHROID) 25 MCG tablet, Take 25 mcg by mouth three times a week Indications: every other day   ferrous sulfate 325 (65 FE) MG tablet, Take 325 mg by mouth daily (with breakfast)   vitamin D (CHOLECALCIFEROL) [address]17 UNIT CAPS, Take 50,000 Units by mouth every 14 days Indications: every 2 weeks   metoprolol (TOPROL-XL) 50 MG XL tablet, Take 50 mg by mouth daily  Coenzyme Q10 (CO Q-10) 400 MG CAPS, Take 400 mg by mouth  Multiple Vitamins-Minerals (CENTRUM SILVER PO), Take 1 tablet by mouth  Multiple Vitamins-Minerals (OCUVITE PO), Take 1 tablet by mouth    Allergies:  Plavix [clopidogrel] and Ace inhibitors    Social History:   Social History     Socioeconomic History    Marital status:      Spouse name: Not on file    Number of children: Not on file    Years of education: Not on file    Highest education level: Not on file   Occupational History    Not on file   Tobacco Use    Smoking status: Never Smoker    Smokeless tobacco: Never Used   Vaping Use    Vaping Use: Never used   Substance and Sexual Activity    Alcohol use: No    Drug use: No    Sexual activity: Not on file   Other Topics Concern    Not on file   Social History Narrative    Not on file     Social Determinants of Health     Financial Resource Strain:     Difficulty of Paying Living Expenses: Not on file   Food Insecurity:     Worried About Running Out of Food in the Last Year: Not on file    Humberto of Food in the Last Year: Not on file   Transportation Needs:     Lack of Transportation (Medical): Not on file    Lack of Transportation (Non-Medical):  Not on file   Physical Activity:     Days of Exercise per Week: Not on file    Minutes of Exercise per Session: Not on file Stress:     Feeling of Stress : Not on file   Social Connections:     Frequency of Communication with Friends and Family: Not on file    Frequency of Social Gatherings with Friends and Family: Not on file    Attends Mormon Services: Not on file    Active Member of [address] [place] [address] [employer] or Organizations: Not on file    Attends Club or Organization Meetings: Not on file    Marital Status: Not on file   Intimate Partner Violence:     Fear of Current or Ex-Partner: Not on file    Emotionally Abused: Not on file    Physically Abused: Not on file    Sexually Abused: Not on file   Housing Stability:     Unable to Pay for Housing in the Last Year: Not on file    Number of [place] in the Last Year: Not on file    Unstable Housing in the Last Year: Not on file         Family History:       Problem Relation Age of Onset    Cancer Mother         Pancreas    Heart Disease Father     Heart Disease Sister     Stroke Brother     Other Brother         Alcohol    Heart Disease Sister        REVIEW OF SYSTEMS:    General ROS: negative  Hematological and Lymphatic ROS: negative  Endocrine ROS: negative  Respiratory ROS: no cough,  wheezing  or shortness of breath,   Cardiovascular ROS: no chest pain or dyspnea on exertion  Gastrointestinal ROS: no abdominal pain, change in bowel habits, or black or bloody stools  Genito-Urinary ROS: no dysuria, trouble voiding, or hematuria  Neurological ROS: no TIA or stroke symptoms  negative    Vitals:  /79   Pulse 92   Temp 97.6 °F (36.4 °C) (Oral)   Resp 18   Ht 5' 6\" (1.676 m)   Wt 184 lb (83.5 kg)   SpO2 95%   BMI 29.70 kg/m²     PHYSICAL EXAM:  General:  Awake, alert, oriented X 3. Well developed, well nourished, well groomed. No apparent distress. HEENT:  Normocephalic, atraumatic. Pupils equal, round, reactive to light. No scleral icterus. No conjunctival injection.    Neck:  Supple, no carotid bruits  Heart:  RRR,   Lungs:  CTA bilaterally, bilat symmetrical expansion, no wheeze, rales, or rhonchi  Abdomen:   Bowel sounds present, soft, nontender, no masses, no organomegaly, no peritoneal signs  Extremities:  No clubbing, cyanosis, or edema  Skin:  Warm and dry, no open lesions or rash  Neuro:  Cranial nerves 2-12 intact, no focal deficits      DATA:     Recent Results (from the past 24 hour(s))   CBC Auto Differential    Collection Time: 01/28/22  3:32 AM   Result Value Ref Range    WBC 3.4 (L) 4.5 - 11.5 E9/L    RBC 3.82 3.50 - 5.50 E12/L    Hemoglobin 10.7 (L) 11.5 - 15.5 g/dL    Hematocrit 35.4 34.0 - 48.0 %    MCV 92.7 80.0 - 99.9 fL    MCH 28.0 26.0 - 35.0 pg    MCHC 30.2 (L) 32.0 - 34.5 %    RDW 15.4 (H) 11.5 - 15.0 fL    Platelets 61 (L) 930 - 450 E9/L    MPV 12.5 (H) 7.0 - 12.0 fL    Neutrophils % 86.6 (H) 43.0 - 80.0 %    Immature Granulocytes % 0.3 0.0 - 5.0 %    Lymphocytes % 10.4 (L) 20.0 - 42.0 %    Monocytes % 2.7 2.0 - 12.0 %    Eosinophils % 0.0 0.0 - 6.0 %    Basophils % 0.0 0.0 - 2.0 %    Neutrophils Absolute 2.90 1.80 - 7.30 E9/L    Immature Granulocytes # 0.01 E9/L    Lymphocytes Absolute 0.35 (L) 1.50 - 4.00 E9/L    Monocytes Absolute 0.09 (L) 0.10 - 0.95 E9/L    Eosinophils Absolute 0.00 (L) 0.05 - 0.50 E9/L    Basophils Absolute 0.00 0.00 - 0.20 E9/L    Poikilocytes 1+     Ovalocytes 1+    Comprehensive Metabolic Panel w/ Reflex to MG    Collection Time: 01/28/22  3:32 AM   Result Value Ref Range    Sodium 139 132 - 146 mmol/L    Potassium reflex Magnesium 4.2 3.5 - 5.0 mmol/L    Chloride 106 98 - 107 mmol/L    CO2 22 22 - 29 mmol/L    Anion Gap 11 7 - 16 mmol/L    Glucose 193 (H) 74 - 99 mg/dL    BUN 15 6 - 23 mg/dL    CREATININE 0.9 0.5 - 1.0 mg/dL    GFR Non-African American >60 >=60 mL/min/1.73    GFR African American >60     Calcium 8.1 (L) 8.6 - 10.2 mg/dL    Total Protein 5.3 (L) 6.4 - 8.3 g/dL    Albumin 3.3 (L) 3.5 - 5.2 g/dL    Total Bilirubin 1.0 0.0 - 1.2 mg/dL    Alkaline Phosphatase 70 35 - 104 U/L    ALT 18 0 - 32 U/L    AST 27 0 - 31 U/L   D-Dimer, Quantitative    Collection Time: 01/28/22  3:32 AM   Result Value Ref Range    D-Dimer, Quant 513 ng/mL DDU   Platelet Confirmation    Collection Time: 01/28/22  3:32 AM   Result Value Ref Range    Platelet Confirmation CONFIRMED        CTA PULMONARY W CONTRAST   Final Result   No evidence of pulmonary embolism. Mild multifocal patchy opacities in both lungs suspicious for atypical or   COVID related pneumonia. In addition, there are multiple small cavitating   lesions at the posterior lung bases, some with internal nodules which may be   seen in aspergilloma. Please correlate with clinical presentation and   consider pulmonary consultation if clinically appropriate. Critical results were called by Dr. Valeri Coburn to Dr. Toni Sims On 1/27/2022 at   18:32. XR CHEST PORTABLE   Final Result   No acute process. ASSESSMENT :      Active Problems:    Acute hypoxemic respiratory failure due to COVID-19 [facility])  Resolved Problems:    * No resolved hospital problems. *    Underlying history of rheumatoid arthritis and is on Arava and prednisone therapy she follows up with rheumatology in [facility]  Hypertension  Hyperlipidemia  Prior history of coronary disease with a history of stent  Thrombocytopenia question from infection  Abnormal CT of the chest possible aspergilloma    Plan :    Elevated CRP noted  Procalcitonin level is low  Oxygen supplementation  Dexamethasone  Remdesivir  ID and pulmonary to see  CBC monitoring  D-dimer relatively low        Electronically signed by Rigoberto Oconnor MD on 1/28/2022 at 3:33 PM    NOTE: This report was transcribed using voice recognition software.  Every effort was made to ensure accuracy; however, inadvertent transcription errors may be present

## 2022-01-28 NOTE — CONSULTS
Pulmonary Consultation    Admit Date: 1/27/2022  Requesting Physician: Katharine Coulter MD    CC:  Abnormal chest ct in the setting of Covid-19 infection    HPI:   This is a 78year old, vaccinated, female who began having sinus symptoms about 1 week ago. She was coughing and having a difficult time expectorating causing severe dyspnea and inability to catch her breath. She was tested for covid at the pharmacy 3 days ago. She has not received her booster. Her CRP 11.5, proBnp 348, Chest ct neg for PE but positive for mild multifocal patchy opacities in both lungs and also multiple small cavitating lesions at the posterior lung bases, some with internal nodules which may be seen in aspergilloma. She has no fever, WBC 3.4, sed rate 45. She denies any history of lung disease including asthma and is a life long non-smoker. She has no knowledge of prior abnormal studies. She worked as an  and denies exposures to farms, mold, dust, chemicals. PMH:    Past Medical History:   Diagnosis Date    CAD (coronary artery disease)     Colon polyps     Osteoarthritis     Thyroid disease    Rheumatoid arthritis on Arava and prednisone  PSH:    Past Surgical History:   Procedure Laterality Date    ABDOMEN SURGERY      CARDIAC SURGERY      COLONOSCOPY      COLONOSCOPY N/A 7/23/2020    COLORECTAL CANCER SCREENING, NOT HIGH RISK performed by Castro Wynn MD at [place] [address]  06/03/2016    Dr. [facility] - 3.5x38 [facility] [place] to Prox RCA          Respiratory ROS: no cough, shortness of breath, or wheezing Otherwise, a complete review of systems is undertaken and is negative. Social History:  Social History     Socioeconomic History    Marital status:       Spouse name: Not on file    Number of children: Not on file    Years of education: Not on file    Highest education level: Not on file   Occupational History    Not on file   Tobacco Use    Smoking status: Never Smoker    Smokeless tobacco: Never Used   Vaping Use    Vaping Use: Never used   Substance and Sexual Activity    Alcohol use: No    Drug use: No    Sexual activity: Not on file   Other Topics Concern    Not on file   Social History Narrative    Not on file     Social Determinants of Health     Financial Resource Strain:     Difficulty of Paying Living Expenses: Not on file   Food Insecurity:     Worried About Running Out of Food in the Last Year: Not on file    Humberto of Food in the Last Year: Not on file   Transportation Needs:     Lack of Transportation (Medical): Not on file    Lack of Transportation (Non-Medical):  Not on file   Physical Activity:     Days of Exercise per Week: Not on file    Minutes of Exercise per Session: Not on file   Stress:     Feeling of Stress : Not on file   Social Connections:     Frequency of Communication with Friends and Family: Not on file    Frequency of Social Gatherings with Friends and Family: Not on file    Attends Islam Services: Not on file    Active Member of [address] [place] [address] or Organizations: Not on file    Attends Club or Organization Meetings: Not on file    Marital Status: Not on file   Intimate Partner Violence:     Fear of Current or Ex-Partner: Not on file    Emotionally Abused: Not on file    Physically Abused: Not on file    Sexually Abused: Not on file   Housing Stability:     Unable to Pay for Housing in the Last Year: Not on file    Number of [place] in the Last Year: Not on file    Unstable Housing in the Last Year: Not on file       Family History:  Family History   Problem Relation Age of Onset    Cancer Mother         Pancreas    Heart Disease Father     Heart Disease Sister     Stroke Brother     Other Brother         Alcohol    Heart Disease Sister        Medications:       levothyroxine  25 mcg Oral Daily    ferrous sulfate  325 mg Oral Daily with breakfast    vitamin D  50,000 Units Oral Q14 Days  metoprolol succinate  50 mg Oral Daily    rosuvastatin  10 mg Oral Nightly    losartan  50 mg Oral QPM    aspirin  325 mg Oral Daily    leflunomide  20 mg Oral Once per day on     enoxaparin  30 mg SubCUTAneous BID    dexamethasone  6 mg Oral Daily    Vitamin D  2,000 Units Oral Daily    remdesivir IVPB  100 mg IntraVENous Q24H         Vitals:    VITALS:  BP (!) 141/65   Pulse 98   Temp 97.9 °F (36.6 °C) (Oral)   Resp 18   Ht 5' 6\" (1.676 m)   Wt 184 lb (83.5 kg)   SpO2 100%   BMI 29.70 kg/m²   24HR INTAKE/OUTPUT:  No intake or output data in the 24 hours ending 22 1025  CURRENT PULSE OXIMETRY:  SpO2: 100 %  24HR PULSE OXIMETRY RANGE:  SpO2  Av.6 %  Min: 86 %  Max: 100 %      EXAM:  General: No distress. Eyes:  No sclera icterus. No conjunctival injection. ENT: No discharge. Pharynx clear. Neck: Trachea midline. Normal thyroid. Resp: No accessory muscle use. No crackles. No wheezing. No rhonchi. CV: Regular rate. Regular rhythm. No mumur or rub. ABD: Non-tender. Non-distended. No masses. No organmegaly. Normal bowel sounds. Skin: Warm and dry. No nodule on exposed extremities. No rash on exposed extremities. Lymph: No cervical LAD. No supraclavicular LAD. Ext: No joint deformity. No clubbing. No cyanosis. No edema  Neuro: Awake. Follows commands      Lab Results:  CBC:   Recent Labs     22  1140 22  0332   WBC 6.0 3.4*   HGB 13.0 10.7*   HCT 42.3 35.4   MCV 90.2 92.7   PLT 80* 61*     BMP:   Recent Labs     22  1140 22  0332    139   K 4.0 4.2   CL 96* 106   CO2 21* 22   BUN 17 15   CREATININE 1.0 0.9     LFT:   Recent Labs     22  1140 22  0332   ALKPHOS 78 70   ALT 19 18   AST 30 27   PROT 7.0 5.3*   BILITOT 1.5* 1.0   LABALBU 3.5 3.3*     PT/INR: No results for input(s): PROTIME, INR in the last 72 hours. Cultures:  No results for input(s): CULTRESP in the last 72 hours.     ABG:   No results for input(s): PH, PO2, PCO2, HCO3, BE, O2SAT in the last 72 hours. Films:  XR CHEST PORTABLE    Result Date: 1/27/2022  EXAMINATION: ONE XRAY VIEW OF THE CHEST 1/27/2022 12:29 pm COMPARISON: 06/02/2016 HISTORY: ORDERING SYSTEM PROVIDED HISTORY: Shortness of breath TECHNOLOGIST PROVIDED HISTORY: Reason for exam:->Shortness of breath FINDINGS: The lungs are without acute focal process. There is no effusion or pneumothorax. The cardiomediastinal silhouette is without acute process. The osseous structures are without acute process. No acute process. CTA PULMONARY W CONTRAST    Result Date: 1/27/2022  EXAMINATION: CTA OF THE CHEST 1/27/2022 5:32 pm TECHNIQUE: CTA of the chest was performed after the administration of intravenous contrast.  Multiplanar reformatted images are provided for review. MIP images are provided for review. Dose modulation, iterative reconstruction, and/or weight based adjustment of the mA/kV was utilized to reduce the radiation dose to as low as reasonably achievable. COMPARISON: None. HISTORY: ORDERING SYSTEM PROVIDED HISTORY: eval for PE TECHNOLOGIST PROVIDED HISTORY: Reason for exam:->eval for PE Decision Support Exception - unselect if not a suspected or confirmed emergency medical condition->Emergency Medical Condition (MA) FINDINGS: Pulmonary Arteries: Pulmonary arteries are adequately opacified for evaluation. No evidence of intraluminal filling defect to suggest pulmonary embolism. Main pulmonary artery is normal in caliber. Mediastinum: No evidence of mediastinal lymphadenopathy. The heart and pericardium demonstrate no acute abnormality. There is no acute abnormality of the thoracic aorta. Lungs/pleura: The lungs demonstrate multifocal patchy opacities mostly in the posterior lungs bilaterally. In addition there are multiple cavitating lesions measuring up to 2.1 cm at the right posterior lung base. At least 1 of these cavitating lesions contain internal nodule.   No evidence of pleural effusion or pneumothorax. Upper Abdomen: Limited images of the upper abdomen are unremarkable. Soft Tissues/Bones: No acute bone or soft tissue abnormality. No evidence of pulmonary embolism. Mild multifocal patchy opacities in both lungs suspicious for atypical or COVID related pneumonia. In addition, there are multiple small cavitating lesions at the posterior lung bases, some with internal nodules which may be seen in aspergilloma. Please correlate with clinical presentation and consider pulmonary consultation if clinically appropriate. Critical results were called by Dr. Rashard Taylor to Dr. My Grigsby On 1/27/2022 at 18:32. Assessment:  · Abnormal chest ct  · Covid-19      Plan:  · She was walked in the ED and maintained pox > 92% at rest and with activity. · PCT neg. No documented respiratory panel so will check this and also legionella and strep antigens. · Will order sputum, patient having difficult time expectorating, although feels something \"stuck in her lungs\" so will add albuterol and sodium chloride nebs to aid this. · ID has been consulted, appreciate input for labs  · Continue IS  · Follow inflammatory markers, she did qualify for remdesivir per pharmacy. · Will need on-going follow up and possible bronchoscopy if s/s infection continue. Thank you for allowing us to see this patient in consultation. The above will be reviewed in collaboration with Dr. Sreekanth Hayden. Please contact us with any questions. Electronically signed by LESTER Brewer CNP on 1/28/2022 at 10:25 AM     Seen and examined, meds, labs and imaging reviewed. Case discussed with Dr. Valeri Campbell. Has cavitary lesions one with nodularity with stalk in right lower lobe. She is immunocompromised on Arava and low dose prednisone so strong concern for fungus. Once treated for COVID and cleared, will need bronchoscopy to look for fungal pathogens.

## 2022-01-29 LAB
D DIMER: 390 NG/ML DDU
FOLATE: >20 NG/ML (ref 4.8–24.2)
IGG: 538 MG/DL (ref 700–1600)
VITAMIN B-12: 675 PG/ML (ref 211–946)

## 2022-01-29 PROCEDURE — 2500000003 HC RX 250 WO HCPCS: Performed by: INTERNAL MEDICINE

## 2022-01-29 PROCEDURE — 82784 ASSAY IGA/IGD/IGG/IGM EACH: CPT

## 2022-01-29 PROCEDURE — 94640 AIRWAY INHALATION TREATMENT: CPT

## 2022-01-29 PROCEDURE — 82607 VITAMIN B-12: CPT

## 2022-01-29 PROCEDURE — 2580000003 HC RX 258: Performed by: INTERNAL MEDICINE

## 2022-01-29 PROCEDURE — 6360000002 HC RX W HCPCS: Performed by: SPECIALIST

## 2022-01-29 PROCEDURE — 6360000002 HC RX W HCPCS: Performed by: INTERNAL MEDICINE

## 2022-01-29 PROCEDURE — 6370000000 HC RX 637 (ALT 250 FOR IP): Performed by: INTERNAL MEDICINE

## 2022-01-29 PROCEDURE — 6360000002 HC RX W HCPCS: Performed by: NURSE PRACTITIONER

## 2022-01-29 PROCEDURE — 82746 ASSAY OF FOLIC ACID SERUM: CPT

## 2022-01-29 PROCEDURE — 2700000000 HC OXYGEN THERAPY PER DAY

## 2022-01-29 PROCEDURE — 2060000000 HC ICU INTERMEDIATE R&B

## 2022-01-29 PROCEDURE — 36415 COLL VENOUS BLD VENIPUNCTURE: CPT

## 2022-01-29 PROCEDURE — 2580000003 HC RX 258: Performed by: SPECIALIST

## 2022-01-29 PROCEDURE — 2580000003 HC RX 258: Performed by: NURSE PRACTITIONER

## 2022-01-29 PROCEDURE — 84145 PROCALCITONIN (PCT): CPT

## 2022-01-29 PROCEDURE — 85378 FIBRIN DEGRADE SEMIQUANT: CPT

## 2022-01-29 RX ORDER — DEXAMETHASONE 4 MG/1
4 TABLET ORAL DAILY
Status: DISCONTINUED | OUTPATIENT
Start: 2022-02-02 | End: 2022-01-31 | Stop reason: HOSPADM

## 2022-01-29 RX ORDER — DEXAMETHASONE 1 MG
2 TABLET ORAL DAILY
Status: DISCONTINUED | OUTPATIENT
Start: 2022-02-07 | End: 2022-01-31 | Stop reason: HOSPADM

## 2022-01-29 RX ADMIN — SODIUM CHLORIDE 30 ML: 9 INJECTION, SOLUTION INTRAVENOUS at 21:30

## 2022-01-29 RX ADMIN — LEVOTHYROXINE SODIUM 25 MCG: 25 TABLET ORAL at 06:54

## 2022-01-29 RX ADMIN — ALBUTEROL SULFATE 2.5 MG: 2.5 SOLUTION RESPIRATORY (INHALATION) at 12:41

## 2022-01-29 RX ADMIN — ALBUTEROL SULFATE 2.5 MG: 2.5 SOLUTION RESPIRATORY (INHALATION) at 09:18

## 2022-01-29 RX ADMIN — SODIUM CHLORIDE SOLN NEBU 3% 4 ML: 3 NEBU SOLN at 21:04

## 2022-01-29 RX ADMIN — METOPROLOL SUCCINATE 50 MG: 50 TABLET, EXTENDED RELEASE ORAL at 09:10

## 2022-01-29 RX ADMIN — ENOXAPARIN SODIUM 30 MG: 100 INJECTION SUBCUTANEOUS at 09:10

## 2022-01-29 RX ADMIN — ENOXAPARIN SODIUM 30 MG: 100 INJECTION SUBCUTANEOUS at 21:26

## 2022-01-29 RX ADMIN — DEXTROSE MONOHYDRATE 100 MG: 50 INJECTION, SOLUTION INTRAVENOUS at 15:25

## 2022-01-29 RX ADMIN — DEXAMETHASONE 6 MG: 4 TABLET ORAL at 09:10

## 2022-01-29 RX ADMIN — Medication 2000 UNITS: at 09:00

## 2022-01-29 RX ADMIN — SODIUM CHLORIDE SOLN NEBU 3% 4 ML: 3 NEBU SOLN at 09:18

## 2022-01-29 RX ADMIN — REMDESIVIR 100 MG: 100 INJECTION, POWDER, LYOPHILIZED, FOR SOLUTION INTRAVENOUS at 21:26

## 2022-01-29 RX ADMIN — FERROUS SULFATE TAB 325 MG (65 MG ELEMENTAL FE) 325 MG: 325 (65 FE) TAB at 09:10

## 2022-01-29 RX ADMIN — ROSUVASTATIN CALCIUM 10 MG: 10 TABLET, FILM COATED ORAL at 21:26

## 2022-01-29 RX ADMIN — ALBUTEROL SULFATE 2.5 MG: 2.5 SOLUTION RESPIRATORY (INHALATION) at 21:04

## 2022-01-29 RX ADMIN — ASPIRIN 325 MG: 325 TABLET, COATED ORAL at 21:25

## 2022-01-29 RX ADMIN — LOSARTAN POTASSIUM 50 MG: 50 TABLET, FILM COATED ORAL at 21:26

## 2022-01-29 RX ADMIN — ALBUTEROL SULFATE 2.5 MG: 2.5 SOLUTION RESPIRATORY (INHALATION) at 16:34

## 2022-01-29 ASSESSMENT — PAIN SCALES - GENERAL
PAINLEVEL_OUTOF10: 0

## 2022-01-29 NOTE — PROGRESS NOTES
Subjective:    Chief complaint:    Feeling much improved  Breathing is easier  Off oxygen    Objective:    BP (!) 116/59   Pulse 99   Temp 96.7 °F (35.9 °C) (Oral)   Resp 18   Ht 5' 6\" (1.676 m)   Wt 181 lb 9.6 oz (82.4 kg)   SpO2 95%   BMI 29.31 kg/m²   General : Awake ,alert,no distress. Heart:  RRR, no murmurs, gallops, or rubs. Lungs:  CTA bilaterally, no wheeze, rales or rhonchi  Abd: bowel sounds present, nontender, nondistended, no masses  Extrem:  No clubbing, cyanosis, or edema    CBC:   Lab Results   Component Value Date    WBC 3.4 01/28/2022    RBC 3.82 01/28/2022    HGB 10.7 01/28/2022    HCT 35.4 01/28/2022    MCV 92.7 01/28/2022    MCH 28.0 01/28/2022    MCHC 30.2 01/28/2022    RDW 15.4 01/28/2022    PLT 61 01/28/2022    MPV 12.5 01/28/2022     BMP:    Lab Results   Component Value Date     01/28/2022    K 4.2 01/28/2022     01/28/2022    CO2 22 01/28/2022    BUN 15 01/28/2022    LABALBU 3.3 01/28/2022    CREATININE 0.9 01/28/2022    CALCIUM 8.1 01/28/2022    GFRAA >60 01/28/2022    LABGLOM >60 01/28/2022    GLUCOSE 193 01/28/2022     PT/INR:    Lab Results   Component Value Date    PROTIME 14.0 06/02/2016    INR 1.3 06/02/2016     Troponin:    Lab Results   Component Value Date    TROPONINI 0.03 06/03/2016       No results for input(s): LABURIN in the last 72 hours. No results for input(s): BC in the last 72 hours. No results for input(s): Pamla Reusing in the last 72 hours.       Current Facility-Administered Medications:     [START ON 2/2/2022] dexamethasone (DECADRON) tablet 4 mg, 4 mg, Oral, Daily, LESTER Martinez CNP    [START ON 2/7/2022] dexamethasone (DECADRON) tablet 2 mg, 2 mg, Oral, Daily, LESTER Martinez CNP    albuterol (PROVENTIL) nebulizer solution 2.5 mg, 2.5 mg, Nebulization, 4x daily, LESTER Sarmiento CNP, 2.5 mg at 01/29/22 1241    sodium chloride (Inhalant) 3 % nebulizer solution 4 mL, 4 mL, Nebulization, BID, Marta Mccann, APRN - CNP, 4 mL at 01/29/22 0918    [COMPLETED] anidulafungin (ERAXIS) 200 mg in dextrose 5 % 260 mL IVPB, 200 mg, IntraVENous, Once, Stopped at 01/28/22 2048 **AND** anidulafungin (ERAXIS) 100 mg in dextrose 5 % 130 mL IVPB, 100 mg, IntraVENous, Q24H, Mi Major MD    levothyroxine (SYNTHROID) tablet 25 mcg, 25 mcg, Oral, Daily, Franck Beckett MD, 25 mcg at 01/29/22 1527    ferrous sulfate (IRON 325) tablet 325 mg, 325 mg, Oral, Daily with breakfast, Franck Beckett MD, 325 mg at 01/29/22 0910    vitamin D (CHOLECALCIFEROL) CAPS 50,000 Units, 50,000 Units, Oral, Q14 Days, Franck Beckett MD    metoprolol succinate (TOPROL XL) extended release tablet 50 mg, 50 mg, Oral, Daily, Franck Beckett MD, 50 mg at 01/29/22 0910    rosuvastatin (CRESTOR) tablet 10 mg, 10 mg, Oral, Nightly, Franck Beckett MD, 10 mg at 01/28/22 2218    losartan (COZAAR) tablet 50 mg, 50 mg, Oral, QPM, Franck Beckett MD, 50 mg at 01/28/22 2218    aspirin EC tablet 325 mg, 325 mg, Oral, Daily, Franck Beckett MD, 325 mg at 01/28/22 2218    leflunomide (ARAVA) tablet 20 mg, 20 mg, Oral, Once per day on Mon Wed Fri, Franck Beckett MD, 20 mg at 01/28/22 7395    acetaminophen (TYLENOL) tablet 650 mg, 650 mg, Oral, Q6H PRN **OR** acetaminophen (TYLENOL) suppository 650 mg, 650 mg, Rectal, Q6H PRN, Franck Beckett MD    enoxaparin (LOVENOX) injection 30 mg, 30 mg, SubCUTAneous, BID, Franck Beckett MD, 30 mg at 01/29/22 0910    dexamethasone (DECADRON) tablet 6 mg, 6 mg, Oral, Daily, Royal Mendez, LESTER - CNP, 6 mg at 01/29/22 0910    Vitamin D (CHOLECALCIFEROL) tablet 2,000 Units, 2,000 Units, Oral, Daily, Franck Beckett MD, 2,000 Units at 01/29/22 0900    [COMPLETED] remdesivir 200 mg in sodium chloride 0.9 % 250 mL IVPB, 200 mg, IntraVENous, Once, Stopped at 01/27/22 2330 **FOLLOWED BY** remdesivir 100 mg in sodium chloride 0.9 % 250 mL IVPB, 100 mg, IntraVENous, Q24H, Rigoberto Oconnor MD, Stopped at 01/28/22 2249    0.9 % sodium chloride bolus, 30 mL, IntraVENous, PRN, Rigoberto Oconnor MD    ADULT DIET; Regular    CTA PULMONARY W CONTRAST   Final Result   No evidence of pulmonary embolism. Mild multifocal patchy opacities in both lungs suspicious for atypical or   COVID related pneumonia. In addition, there are multiple small cavitating   lesions at the posterior lung bases, some with internal nodules which may be   seen in aspergilloma. Please correlate with clinical presentation and   consider pulmonary consultation if clinically appropriate. Critical results were called by Dr. Valeri Coburn to Dr. Toni Sims On 1/27/2022 at   18:32. XR CHEST PORTABLE   Final Result   No acute process. Assessment:    Active Problems:    Acute hypoxemic respiratory failure due to COVID-19 [facility])  Resolved Problems:    * No resolved hospital problems. 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  Every effort was made to ensure accuracy; however, inadvertent transcription errors may be present

## 2022-01-29 NOTE — PROGRESS NOTES
Pulmonary Progress Note    Admit Date: 2022  Requesting Physician: Maddie Haines MD    SUBJECTIVE:  Resting in bed on room air with no complaints  Wants to go home      Medications:       albuterol  2.5 mg Nebulization 4x daily    sodium chloride (Inhalant)  4 mL Nebulization BID    anidulafungin  100 mg IntraVENous Q24H    levothyroxine  25 mcg Oral Daily    ferrous sulfate  325 mg Oral Daily with breakfast    vitamin D  50,000 Units Oral Q14 Days    metoprolol succinate  50 mg Oral Daily    rosuvastatin  10 mg Oral Nightly    losartan  50 mg Oral QPM    aspirin  325 mg Oral Daily    leflunomide  20 mg Oral Once per day on     enoxaparin  30 mg SubCUTAneous BID    dexamethasone  6 mg Oral Daily    Vitamin D  2,000 Units Oral Daily    remdesivir IVPB  100 mg IntraVENous Q24H         Vitals:    VITALS:  BP (!) 116/59   Pulse 99   Temp 96.7 °F (35.9 °C) (Oral)   Resp 18   Ht 5' 6\" (1.676 m)   Wt 181 lb 9.6 oz (82.4 kg)   SpO2 95%   BMI 29.31 kg/m²   24HR INTAKE/OUTPUT:  No intake or output data in the 24 hours ending 22 1139  CURRENT PULSE OXIMETRY:  SpO2: 95 %  24HR PULSE OXIMETRY RANGE:  SpO2  Av.5 %  Min: 92 %  Max: 96 %      EXAM:  General: No distress. Eyes:  No sclera icterus. No conjunctival injection. ENT: No discharge. Pharynx clear. Neck: Trachea midline. Normal thyroid. Resp: No accessory muscle use. Diminished. No crackles. No wheezing. No rhonchi. CV: Regular rate. Regular rhythm. No mumur or rub. ABD: Non-tender. Non-distended. No masses. No organmegaly. Normal bowel sounds. Skin: Warm and dry. No nodule on exposed extremities. No rash on exposed extremities. Lymph: No cervical LAD. No supraclavicular LAD. Ext: No joint deformity. No clubbing. No cyanosis. No edema  Neuro: Awake.  Follows commands      Lab Results:  CBC:   Recent Labs     22  1140 22  0332   WBC 6.0 3.4*   HGB 13.0 10.7*   HCT 42.3 35.4   MCV 90.2 92.7   PLT 80* 61*     BMP:   Recent Labs     01/27/22  1140 01/28/22  0332    139   K 4.0 4.2   CL 96* 106   CO2 21* 22   BUN 17 15   CREATININE 1.0 0.9     LFT:   Recent Labs     01/27/22  1140 01/28/22  0332   ALKPHOS 78 70   ALT 19 18   AST 30 27   PROT 7.0 5.3*   BILITOT 1.5* 1.0   LABALBU 3.5 3.3*     PT/INR: No results for input(s): PROTIME, INR in the last 72 hours. Cultures:  No results for input(s): CULTRESP in the last 72 hours. ABG:   No results for input(s): PH, PO2, PCO2, HCO3, BE, O2SAT in the last 72 hours. Films:  XR CHEST PORTABLE    Result Date: 1/27/2022  EXAMINATION: ONE XRAY VIEW OF THE CHEST 1/27/2022 12:29 pm COMPARISON: 06/02/2016 HISTORY: ORDERING SYSTEM PROVIDED HISTORY: Shortness of breath TECHNOLOGIST PROVIDED HISTORY: Reason for exam:->Shortness of breath FINDINGS: The lungs are without acute focal process. There is no effusion or pneumothorax. The cardiomediastinal silhouette is without acute process. The osseous structures are without acute process. No acute process. CTA PULMONARY W CONTRAST    Result Date: 1/27/2022  EXAMINATION: CTA OF THE CHEST 1/27/2022 5:32 pm TECHNIQUE: CTA of the chest was performed after the administration of intravenous contrast.  Multiplanar reformatted images are provided for review. MIP images are provided for review. Dose modulation, iterative reconstruction, and/or weight based adjustment of the mA/kV was utilized to reduce the radiation dose to as low as reasonably achievable. COMPARISON: None. HISTORY: ORDERING SYSTEM PROVIDED HISTORY: eval for PE TECHNOLOGIST PROVIDED HISTORY: Reason for exam:->eval for PE Decision Support Exception - unselect if not a suspected or confirmed emergency medical condition->Emergency Medical Condition (MA) FINDINGS: Pulmonary Arteries: Pulmonary arteries are adequately opacified for evaluation. No evidence of intraluminal filling defect to suggest pulmonary embolism.   Main pulmonary artery is normal in caliber. Mediastinum: No evidence of mediastinal lymphadenopathy. The heart and pericardium demonstrate no acute abnormality. There is no acute abnormality of the thoracic aorta. Lungs/pleura: The lungs demonstrate multifocal patchy opacities mostly in the posterior lungs bilaterally. In addition there are multiple cavitating lesions measuring up to 2.1 cm at the right posterior lung base. At least 1 of these cavitating lesions contain internal nodule. No evidence of pleural effusion or pneumothorax. Upper Abdomen: Limited images of the upper abdomen are unremarkable. Soft Tissues/Bones: No acute bone or soft tissue abnormality. No evidence of pulmonary embolism. Mild multifocal patchy opacities in both lungs suspicious for atypical or COVID related pneumonia. In addition, there are multiple small cavitating lesions at the posterior lung bases, some with internal nodules which may be seen in aspergilloma. Please correlate with clinical presentation and consider pulmonary consultation if clinically appropriate. Critical results were called by Dr. Loy Hardy to Dr. Bertram Anaya On 1/27/2022 at 18:32. Assessment:  · Abnormal chest ct  · Covid-19      Plan:  · On Remdesivir. · ID following and started Eraxis; ordered labs  · On Dexamethasone 6mg daily - will taper  · Now on room air and doing well. · PCT neg. No documented respiratory panel so will check this and also legionella and strep antigens. · Sputum obtained this am - but very little, not sure specimen will be enough. Continue albuterol and sodium chloride nebs to aid this, feels better after. · Continue IS  · Follow inflammatory markers, she did qualify for remdesivir per pharmacy. D dimer was 513->390, CRP 11.5  · Will need to follow up and schedule for bronchoscopy to look for fungal pathogens as she is immunocompromised on Arava and prednisone.     LESTER Ndiaye CNP    Electronically signed by LESTER Ndiaye CNP on 1/29/2022 at 11:39 AM     Attending Attestation Note:    Patient seen and examined with NP. I agree with above.     Nathanael Bermudez MD  1/29/2022  4:06 PM

## 2022-01-29 NOTE — PROGRESS NOTES
S: Patient states mucous is loosening up, does not feel bad    O:  Temp 96.8 P-81 R-18 /60  Gen- alert, talking, no acute distress  HEENT_ mmm  Lungs- mild decreased air movement  abd- soft/nt/n d+BS  Ext- warm and well perfused    Labs:  1-28-22 creatinine 0.9 LFT\"w wnl, wbc=3.4 hb=10.4 plt=61,000    CTA chest 1-27-22    No evidence of pulmonary embolism.       Mild multifocal patchy opacities in both lungs suspicious for atypical or   COVID related pneumonia.  In addition, there are multiple small cavitating   lesions at the posterior lung bases, some with internal nodules which may be   seen in aspergilloma.  Please correlate with clinical presentation and   consider pulmonary consultation if clinically appropriate. A/P: 78year old female known to [address] [place] [address] with anemia and mild thrombocytopenia of chronic disease with rheumatoid arthritis on Aprava for 3 years admitted with COVID pneumonia and cavitary lung nodules suspicious for aspergillosis. Anemia and thrombocytopenia worse than baseline due to acute infection    1.holding aprava  2. eraxis for suspicious fungal pneumonia  3. Decadron  4. DVT prophylaxis  5. check IgG level, if low give IVIG Infusion    Parvin Simpson MD

## 2022-01-29 NOTE — PLAN OF CARE
Problem: Airway Clearance - Ineffective:  Goal: Ability to maintain a clear airway will improve  Description: Ability to maintain a clear airway will improve  Outcome: Met This Shift

## 2022-01-29 NOTE — PROGRESS NOTES
[address] [place] [address] [facility]  Progress Note    SUBJECTIVE:  Chief Complaint   Patient presents with    Positive For Covid-19     sent in by pcp for tachycardia, hr in 130's in triage, dizziness    Shortness of Breath     The patient is feeling well. He denies dyspnea. No nausea or vomiting. Tolerating antibiotics. She wants to go home to take care of her mother and dog. Review of systems:  As stated above in the chief complaint, otherwise negative. Medications:  Scheduled Meds:   albuterol  2.5 mg Nebulization 4x daily    sodium chloride (Inhalant)  4 mL Nebulization BID    anidulafungin  100 mg IntraVENous Q24H    levothyroxine  25 mcg Oral Daily    ferrous sulfate  325 mg Oral Daily with breakfast    vitamin D  50,000 Units Oral Q14 Days    metoprolol succinate  50 mg Oral Daily    rosuvastatin  10 mg Oral Nightly    losartan  50 mg Oral QPM    aspirin  325 mg Oral Daily    leflunomide  20 mg Oral Once per day on     enoxaparin  30 mg SubCUTAneous BID    dexamethasone  6 mg Oral Daily    Vitamin D  2,000 Units Oral Daily    remdesivir IVPB  100 mg IntraVENous Q24H     Continuous Infusions:  PRN Meds:acetaminophen **OR** acetaminophen, sodium chloride    OBJECTIVE:  BP (!) 116/59   Pulse 99   Temp 96.7 °F (35.9 °C) (Oral)   Resp 18   Ht 5' 6\" (1.676 m)   Wt 181 lb 9.6 oz (82.4 kg)   SpO2 96%   BMI 29.31 kg/m²   Temp  Av.5 °F (36.4 °C)  Min: 96.7 °F (35.9 °C)  Max: 97.8 °F (36.6 °C)  Constitutional: The patient is awake, alert, and oriented. No distress. Skin: Warm and dry. No rashes were noted. HEENT: Round and reactive pupils. Moist mucous membranes. No ulcerations or thrush. Neck: Supple to movements. Chest: No use of accessory muscles to breathe. Symmetrical expansion. No wheezing, crackles or rhonchi. Cardiovascular: S1 and S2 are rhythmic and regular. No murmurs appreciated. Abdomen: Positive bowel sounds to auscultation. Benign to palpation. No masses felt. No hepatosplenomegaly. Extremities: No edema.   Lines: peripheral    Laboratory and Tests Review:  Lab Results   Component Value Date    WBC 3.4 (L) 01/28/2022    WBC 6.0 01/27/2022    WBC 8.0 06/02/2016    HGB 10.7 (L) 01/28/2022    HCT 35.4 01/28/2022    MCV 92.7 01/28/2022    PLT 61 (L) 01/28/2022     Lab Results   Component Value Date    NEUTROABS 2.90 01/28/2022    NEUTROABS 4.82 01/27/2022    NEUTROABS 6.30 06/02/2016     No results found for: CRPHS  Lab Results   Component Value Date    ALT 18 01/28/2022    AST 27 01/28/2022    ALKPHOS 70 01/28/2022    BILITOT 1.0 01/28/2022     Lab Results   Component Value Date     01/28/2022    K 4.2 01/28/2022     01/28/2022    CO2 22 01/28/2022    BUN 15 01/28/2022    CREATININE 0.9 01/28/2022    CREATININE 1.0 01/27/2022    CREATININE 1.0 06/04/2016    GFRAA >60 01/28/2022    LABGLOM >60 01/28/2022    GLUCOSE 193 01/28/2022    PROT 5.3 01/28/2022    LABALBU 3.3 01/28/2022    CALCIUM 8.1 01/28/2022    BILITOT 1.0 01/28/2022    ALKPHOS 70 01/28/2022    AST 27 01/28/2022    ALT 18 01/28/2022     Lab Results   Component Value Date    CRP 11.5 (H) 01/27/2022     Lab Results   Component Value Date    SEDRATE 45 (H) 01/27/2022     Radiology:      Microbiology:     Recent Labs     01/27/22  1127   PROCAL 0.09*       ASSESSMENT:  · SARS-CoV-2 infection  · Right lung cavitary lesions with intracavitary lesion 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 Harish Holly MD  9:20 AM  1/29/2022

## 2022-01-30 LAB — PROCALCITONIN: 0.07 NG/ML (ref 0–0.08)

## 2022-01-30 PROCEDURE — 2500000003 HC RX 250 WO HCPCS: Performed by: INTERNAL MEDICINE

## 2022-01-30 PROCEDURE — 6360000002 HC RX W HCPCS: Performed by: INTERNAL MEDICINE

## 2022-01-30 PROCEDURE — 6360000002 HC RX W HCPCS: Performed by: SPECIALIST

## 2022-01-30 PROCEDURE — 6370000000 HC RX 637 (ALT 250 FOR IP): Performed by: INTERNAL MEDICINE

## 2022-01-30 PROCEDURE — 6360000002 HC RX W HCPCS: Performed by: NURSE PRACTITIONER

## 2022-01-30 PROCEDURE — 2580000003 HC RX 258: Performed by: INTERNAL MEDICINE

## 2022-01-30 PROCEDURE — 2580000003 HC RX 258: Performed by: SPECIALIST

## 2022-01-30 PROCEDURE — 94640 AIRWAY INHALATION TREATMENT: CPT

## 2022-01-30 PROCEDURE — 2060000000 HC ICU INTERMEDIATE R&B

## 2022-01-30 PROCEDURE — 2580000003 HC RX 258: Performed by: NURSE PRACTITIONER

## 2022-01-30 RX ORDER — ACETAMINOPHEN 325 MG/1
325 TABLET ORAL ONCE
Status: COMPLETED | OUTPATIENT
Start: 2022-01-30 | End: 2022-01-30

## 2022-01-30 RX ADMIN — DEXAMETHASONE 6 MG: 4 TABLET ORAL at 08:06

## 2022-01-30 RX ADMIN — ALBUTEROL SULFATE 2.5 MG: 2.5 SOLUTION RESPIRATORY (INHALATION) at 20:18

## 2022-01-30 RX ADMIN — ASPIRIN 325 MG: 325 TABLET, COATED ORAL at 20:07

## 2022-01-30 RX ADMIN — SODIUM CHLORIDE SOLN NEBU 3% 4 ML: 3 NEBU SOLN at 20:18

## 2022-01-30 RX ADMIN — REMDESIVIR 100 MG: 100 INJECTION, POWDER, LYOPHILIZED, FOR SOLUTION INTRAVENOUS at 20:06

## 2022-01-30 RX ADMIN — ALBUTEROL SULFATE 2.5 MG: 2.5 SOLUTION RESPIRATORY (INHALATION) at 08:31

## 2022-01-30 RX ADMIN — ENOXAPARIN SODIUM 30 MG: 100 INJECTION SUBCUTANEOUS at 08:07

## 2022-01-30 RX ADMIN — LOSARTAN POTASSIUM 50 MG: 50 TABLET, FILM COATED ORAL at 20:07

## 2022-01-30 RX ADMIN — SODIUM CHLORIDE SOLN NEBU 3% 4 ML: 3 NEBU SOLN at 08:32

## 2022-01-30 RX ADMIN — LEVOTHYROXINE SODIUM 25 MCG: 25 TABLET ORAL at 05:52

## 2022-01-30 RX ADMIN — IMMUNE GLOBULIN (HUMAN) 30 G: 10 INJECTION INTRAVENOUS; SUBCUTANEOUS at 13:46

## 2022-01-30 RX ADMIN — ALBUTEROL SULFATE 2.5 MG: 2.5 SOLUTION RESPIRATORY (INHALATION) at 11:48

## 2022-01-30 RX ADMIN — FERROUS SULFATE TAB 325 MG (65 MG ELEMENTAL FE) 325 MG: 325 (65 FE) TAB at 08:07

## 2022-01-30 RX ADMIN — ALBUTEROL SULFATE 2.5 MG: 2.5 SOLUTION RESPIRATORY (INHALATION) at 15:57

## 2022-01-30 RX ADMIN — METOPROLOL SUCCINATE 50 MG: 50 TABLET, EXTENDED RELEASE ORAL at 08:07

## 2022-01-30 RX ADMIN — ROSUVASTATIN CALCIUM 10 MG: 10 TABLET, FILM COATED ORAL at 20:08

## 2022-01-30 RX ADMIN — Medication 2000 UNITS: at 08:06

## 2022-01-30 RX ADMIN — ENOXAPARIN SODIUM 30 MG: 100 INJECTION SUBCUTANEOUS at 20:08

## 2022-01-30 RX ADMIN — DEXTROSE MONOHYDRATE 100 MG: 50 INJECTION, SOLUTION INTRAVENOUS at 16:11

## 2022-01-30 RX ADMIN — ACETAMINOPHEN 325 MG: 325 TABLET ORAL at 13:45

## 2022-01-30 ASSESSMENT — PAIN SCALES - GENERAL
PAINLEVEL_OUTOF10: 0

## 2022-01-30 NOTE — PROGRESS NOTES
Pulmonary Progress Note    Admit Date: 2022  Requesting Physician: Linda Stewart MD    SUBJECTIVE:  Resting in bed  On room air  No dyspnea but has cough with difficulty expectorating phlegm    Medications:       [START ON 2022] dexamethasone  4 mg Oral Daily    [START ON 2022] dexamethasone  2 mg Oral Daily    albuterol  2.5 mg Nebulization 4x daily    sodium chloride (Inhalant)  4 mL Nebulization BID    anidulafungin  100 mg IntraVENous Q24H    levothyroxine  25 mcg Oral Daily    ferrous sulfate  325 mg Oral Daily with breakfast    vitamin D  50,000 Units Oral Q14 Days    metoprolol succinate  50 mg Oral Daily    rosuvastatin  10 mg Oral Nightly    losartan  50 mg Oral QPM    aspirin  325 mg Oral Daily    leflunomide  20 mg Oral Once per day on     enoxaparin  30 mg SubCUTAneous BID    dexamethasone  6 mg Oral Daily    Vitamin D  2,000 Units Oral Daily    remdesivir IVPB  100 mg IntraVENous Q24H         Vitals:    VITALS:  BP (!) 152/67   Pulse 99   Temp 97.9 °F (36.6 °C) (Oral)   Resp 18   Ht 5' 6\" (1.676 m)   Wt 181 lb 9.6 oz (82.4 kg)   SpO2 96%   BMI 29.31 kg/m²   24HR INTAKE/OUTPUT:  No intake or output data in the 24 hours ending 22 0935  CURRENT PULSE OXIMETRY:  SpO2: 96 %  24HR PULSE OXIMETRY RANGE:  SpO2  Av.6 %  Min: 95 %  Max: 100 %      EXAM:  General: No distress. Eyes:  No sclera icterus. No conjunctival injection. ENT: No discharge. Pharynx clear. Neck: Trachea midline. Normal thyroid. Resp: No accessory muscle use. Diminished. No crackles. No wheezing. No rhonchi. CV: Regular rate. Regular rhythm. No mumur or rub. ABD: Non-tender. Non-distended. No masses. No organmegaly. Normal bowel sounds. Skin: Warm and dry. No nodule on exposed extremities. No rash on exposed extremities. Lymph: No cervical LAD. No supraclavicular LAD. Ext: No joint deformity. No clubbing. No cyanosis. No edema  Neuro: Awake.  Follows commands Lab Results:  CBC:   Recent Labs     01/27/22  1140 01/28/22  0332   WBC 6.0 3.4*   HGB 13.0 10.7*   HCT 42.3 35.4   MCV 90.2 92.7   PLT 80* 61*     BMP:   Recent Labs     01/27/22  1140 01/28/22  0332    139   K 4.0 4.2   CL 96* 106   CO2 21* 22   BUN 17 15   CREATININE 1.0 0.9     LFT:   Recent Labs     01/27/22  1140 01/28/22  0332   ALKPHOS 78 70   ALT 19 18   AST 30 27   PROT 7.0 5.3*   BILITOT 1.5* 1.0   LABALBU 3.5 3.3*     PT/INR: No results for input(s): PROTIME, INR in the last 72 hours. Cultures:  No results for input(s): CULTRESP in the last 72 hours. ABG:   No results for input(s): PH, PO2, PCO2, HCO3, BE, O2SAT in the last 72 hours. Films:  XR CHEST  1/27/2022  : The lungs are without acute focal process. There is no effusion or pneumothorax. The cardiomediastinal silhouette is without acute process. The osseous structures are without acute process. No acute process. CTA PULMONARY 1/27/2022: Pulmonary Arteries: Pulmonary arteries are adequately opacified for evaluation. No evidence of intraluminal filling defect to suggest pulmonary embolism. Main pulmonary artery is normal in caliber. Mediastinum: No evidence of mediastinal lymphadenopathy. The heart and pericardium demonstrate no acute abnormality. There is no acute abnormality of the thoracic aorta. Lungs/pleura: The lungs demonstrate multifocal patchy opacities mostly in the posterior lungs bilaterally. In addition there are multiple cavitating lesions measuring up to 2.1 cm at the right posterior lung base. At least 1 of these cavitating lesions contain internal nodule. No evidence of pleural effusion or pneumothorax. Upper Abdomen: Limited images of the upper abdomen are unremarkable. Soft Tissues/Bones: No acute bone or soft tissue abnormality. No evidence of pulmonary embolism. Mild multifocal patchy opacities in both lungs suspicious for atypical or COVID related pneumonia.   In addition, there are multiple small cavitating lesions at the posterior lung bases, some with internal nodules which may be seen in aspergilloma. Please correlate with clinical presentation and consider pulmonary consultation if clinically appropriate. Critical results were called by Dr. Flaca Estrada to Dr. Susan Nobles On 1/27/2022 at 18:32. Assessment:  · Abnormal chest ct  · Covid-19      Plan:  · On Remdesivir . · ID following. On Eraxis  · On Dexamethasone 6mg daily - will taper  · On room air and doing well. · PCT neg 0.07. No documented respiratory panel. Will check legionella and strep antigens - will order. · Sputum obtained yesterday but not in computer - will reorder. Continue albuterol and sodium chloride nebs to aid this, feels better after. · Continue IS  · Follow inflammatory markers  D dimer was 513->390, CRP 11.5  · Heme/Onc following - IgG 538 - to get IVIG infusion. · Will need to follow up and schedule for bronchoscopy to look for fungal pathogens as she is immunocompromised on Arava and prednisone. LESTER Liu CNP    Electronically signed by LESTER Liu CNP on 1/30/2022 at 9:35 AM     Attending Attestation Note:    Patient seen and examined with NP. I agree with above. In addition, the following apply:    · On Remdesivir . · ID following. On Eraxis  · On Dexamethasone 6mg daily - will taper  · On room air and doing well.    · Await D/C disposition     Masood Newberry MD  1/30/2022  5:06 PM

## 2022-01-30 NOTE — PROGRESS NOTES
Subjective:    Chief complaint:    Has no new issues    Objective:    BP (!) 152/67   Pulse 99   Temp 97.9 °F (36.6 °C) (Oral)   Resp 18   Ht 5' 6\" (1.676 m)   Wt 181 lb 9.6 oz (82.4 kg)   SpO2 95%   BMI 29.31 kg/m²   General : Awake ,alert,no distress. Heart:  RRR, no murmurs, gallops, or rubs. Lungs:  CTA bilaterally, no wheeze, rales or rhonchi  Abd: bowel sounds present, nontender, nondistended, no masses  Extrem:  No clubbing, cyanosis, or edema    CBC:   Lab Results   Component Value Date    WBC 3.4 01/28/2022    RBC 3.82 01/28/2022    HGB 10.7 01/28/2022    HCT 35.4 01/28/2022    MCV 92.7 01/28/2022    MCH 28.0 01/28/2022    MCHC 30.2 01/28/2022    RDW 15.4 01/28/2022    PLT 61 01/28/2022    MPV 12.5 01/28/2022     BMP:    Lab Results   Component Value Date     01/28/2022    K 4.2 01/28/2022     01/28/2022    CO2 22 01/28/2022    BUN 15 01/28/2022    LABALBU 3.3 01/28/2022    CREATININE 0.9 01/28/2022    CALCIUM 8.1 01/28/2022    GFRAA >60 01/28/2022    LABGLOM >60 01/28/2022    GLUCOSE 193 01/28/2022     PT/INR:    Lab Results   Component Value Date    PROTIME 14.0 06/02/2016    INR 1.3 06/02/2016     Troponin:    Lab Results   Component Value Date    TROPONINI 0.03 06/03/2016       No results for input(s): LABURIN in the last 72 hours. No results for input(s): BC in the last 72 hours. No results for input(s): Susie Dasilva in the last 72 hours.       Current Facility-Administered Medications:     immune globulin (GAMUNEX-C) 10% solution 30 g, 30 g, IntraVENous, Titrated, Manoj Gonzalez MD    acetaminophen (TYLENOL) tablet 325 mg, 325 mg, Oral, Once, Manoj Gonzalez MD    [START ON 2/2/2022] dexamethasone (DECADRON) tablet 4 mg, 4 mg, Oral, Daily, SkippLESTER Rivera CNP    [START ON 2/7/2022] dexamethasone (DECADRON) tablet 2 mg, 2 mg, Oral, Daily, SkippLESTER Rivera CNP    albuterol (PROVENTIL) nebulizer solution 2.5 mg, 2.5 mg, Nebulization, 4x daily, Nilson Gallegos Cicchillo, APRN - CNP, 2.5 mg at 01/30/22 1148    sodium chloride (Inhalant) 3 % nebulizer solution 4 mL, 4 mL, Nebulization, BID, Marta L Cicchillo, APRN - CNP, 4 mL at 01/30/22 0832    [COMPLETED] anidulafungin (ERAXIS) 200 mg in dextrose 5 % 260 mL IVPB, 200 mg, IntraVENous, Once, Stopped at 01/28/22 2048 **AND** anidulafungin (ERAXIS) 100 mg in dextrose 5 % 130 mL IVPB, 100 mg, IntraVENous, Q24H, Dorothea Anderson MD, Stopped at 01/29/22 1600    levothyroxine (SYNTHROID) tablet 25 mcg, 25 mcg, Oral, Daily, Teto Boogie MD, 25 mcg at 01/30/22 5899    ferrous sulfate (IRON 325) tablet 325 mg, 325 mg, Oral, Daily with breakfast, Teto Boogie MD, 325 mg at 01/30/22 0807    vitamin D (CHOLECALCIFEROL) CAPS 50,000 Units, 50,000 Units, Oral, Q14 Days, Teto Boogie MD    metoprolol succinate (TOPROL XL) extended release tablet 50 mg, 50 mg, Oral, Daily, Teto Boogie MD, 50 mg at 01/30/22 0807    rosuvastatin (CRESTOR) tablet 10 mg, 10 mg, Oral, Nightly, Teto Boogie MD, 10 mg at 01/29/22 2126    losartan (COZAAR) tablet 50 mg, 50 mg, Oral, QPM, Teto Boogie MD, 50 mg at 01/29/22 2126    aspirin EC tablet 325 mg, 325 mg, Oral, Daily, Teto Boogie MD, 325 mg at 01/29/22 2125    leflunomide (ARAVA) tablet 20 mg, 20 mg, Oral, Once per day on Mon Wed Fri, Teto Boogie MD, 20 mg at 01/28/22 7629    acetaminophen (TYLENOL) tablet 650 mg, 650 mg, Oral, Q6H PRN **OR** acetaminophen (TYLENOL) suppository 650 mg, 650 mg, Rectal, Q6H PRN, Teto Boogie MD    enoxaparin (LOVENOX) injection 30 mg, 30 mg, SubCUTAneous, BID, Teto Boogie MD, 30 mg at 01/30/22 0807    dexamethasone (DECADRON) tablet 6 mg, 6 mg, Oral, Daily, Ciara Cosby, APRN - CNP, 6 mg at 01/30/22 4147    Vitamin D (CHOLECALCIFEROL) tablet 2,000 Units, 2,000 Units, Oral, Daily, Teto Boogie MD, 2,000 Units at 01/30/22 0806    [COMPLETED] remdesivir 200 mg in sodium chloride 0.9 % 250 mL IVPB, 200 mg, IntraVENous, Once, Stopped at 01/27/22 2330 **FOLLOWED BY** remdesivir 100 mg in sodium chloride 0.9 % 250 mL IVPB, 100 mg, IntraVENous, Q24H, Melina Hartman MD, Stopped at 01/29/22 2319    0.9 % sodium chloride bolus, 30 mL, IntraVENous, PRN, Melina Hartman MD, Stopped at 01/29/22 2134    ADULT DIET; Regular    CTA PULMONARY W CONTRAST   Final Result   No evidence of pulmonary embolism. Mild multifocal patchy opacities in both lungs suspicious for atypical or   COVID related pneumonia. In addition, there are multiple small cavitating   lesions at the posterior lung bases, some with internal nodules which may be   seen in aspergilloma. Please correlate with clinical presentation and   consider pulmonary consultation if clinically appropriate. Critical results were called by Dr. Todd Elmore to Dr. Michelle Miles On 1/27/2022 at   18:32. XR CHEST PORTABLE   Final Result   No acute process. Assessment:    Active Problems:    Acute hypoxemic respiratory failure due to COVID-19 [facility])  Resolved Problems:    * No resolved hospital problems. 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  Every effort was made to ensure accuracy; however, inadvertent transcription errors may be present

## 2022-01-30 NOTE — PROGRESS NOTES
Temp 97.9 P-99 R-18 /67    Labs:  1-29-22 B40=087, folate >20, IgG low at 538    1-28-22 creatinine 0.9 LFT\"w wnl, wbc=3.4 hb=10.4 plt=61,000     CTA chest 1-27-22     No evidence of pulmonary embolism.       Mild multifocal patchy opacities in both lungs suspicious for atypical or   COVID related pneumonia.  In addition, there are multiple small cavitating   lesions at the posterior lung bases, some with internal nodules which may be   seen in aspergilloma.  Please correlate with clinical presentation and   consider pulmonary consultation if clinically appropriate.      A/P: 78year old female known to [address] [place] [address] with anemia and mild thrombocytopenia of chronic disease with rheumatoid arthritis on Aprava for 3 years admitted with COVID pneumonia and cavitary lung nodules suspicious for aspergillosis. Anemia and thrombocytopenia worse than baseline due to acute infection no vitamin deficiencies.     1.holding aprava  2. eraxis for suspicious fungal pneumonia  3. Decadron  4.  DVT prophylaxis  5.give IVIG Infusion today for hypogammaglobulinemia    Parvin Simpson MD

## 2022-01-30 NOTE — PROGRESS NOTES
[address] [place] [address] [facility]  Progress Note    SUBJECTIVE:  Chief Complaint   Patient presents with    Positive For Covid-19     sent in by pcp for tachycardia, hr in 130's in triage, dizziness    Shortness of Breath     No new complaints. Tolerating antifungal.  No pain. No fever    Review of systems:  As stated above in the chief complaint, otherwise negative. Medications:  Scheduled Meds:   [START ON 2022] dexamethasone  4 mg Oral Daily    [START ON 2022] dexamethasone  2 mg Oral Daily    albuterol  2.5 mg Nebulization 4x daily    sodium chloride (Inhalant)  4 mL Nebulization BID    anidulafungin  100 mg IntraVENous Q24H    levothyroxine  25 mcg Oral Daily    ferrous sulfate  325 mg Oral Daily with breakfast    vitamin D  50,000 Units Oral Q14 Days    metoprolol succinate  50 mg Oral Daily    rosuvastatin  10 mg Oral Nightly    losartan  50 mg Oral QPM    aspirin  325 mg Oral Daily    leflunomide  20 mg Oral Once per day on     enoxaparin  30 mg SubCUTAneous BID    dexamethasone  6 mg Oral Daily    Vitamin D  2,000 Units Oral Daily    remdesivir IVPB  100 mg IntraVENous Q24H     Continuous Infusions:  PRN Meds:acetaminophen **OR** acetaminophen, sodium chloride    OBJECTIVE:  BP (!) 152/67   Pulse 99   Temp 97.9 °F (36.6 °C) (Oral)   Resp 18   Ht 5' 6\" (1.676 m)   Wt 181 lb 9.6 oz (82.4 kg)   SpO2 96%   BMI 29.31 kg/m²   Temp  Av.4 °F (36.3 °C)  Min: 96.8 °F (36 °C)  Max: 97.9 °F (36.6 °C)  Constitutional: The patient is awake, alert, and oriented. No distress. Skin: Warm and dry. No rashes were noted. HEENT: Round and reactive pupils. Moist mucous membranes. No ulcerations or thrush. Neck: Supple to movements. Chest: No respiratory distress. No crackles. Cardiovascular: Heart sounds rhythmic and regular. Abdomen: Positive bowel sounds to auscultation. Benign to palpation. Extremities: No edema.   Lines: peripheral    Laboratory and Tests Review:  Lab Results   Component Value Date    WBC 3.4 (L) 01/28/2022    WBC 6.0 01/27/2022    WBC 8.0 06/02/2016    HGB 10.7 (L) 01/28/2022    HCT 35.4 01/28/2022    MCV 92.7 01/28/2022    PLT 61 (L) 01/28/2022     Lab Results   Component Value Date    NEUTROABS 2.90 01/28/2022    NEUTROABS 4.82 01/27/2022    NEUTROABS 6.30 06/02/2016     No results found for: [facility]  Lab Results   Component Value Date    ALT 18 01/28/2022    AST 27 01/28/2022    ALKPHOS 70 01/28/2022    BILITOT 1.0 01/28/2022     Lab Results   Component Value Date     01/28/2022    K 4.2 01/28/2022     01/28/2022    CO2 22 01/28/2022    BUN 15 01/28/2022    CREATININE 0.9 01/28/2022    CREATININE 1.0 01/27/2022    CREATININE 1.0 06/04/2016    GFRAA >60 01/28/2022    LABGLOM >60 01/28/2022    GLUCOSE 193 01/28/2022    PROT 5.3 01/28/2022    LABALBU 3.3 01/28/2022    CALCIUM 8.1 01/28/2022    BILITOT 1.0 01/28/2022    ALKPHOS 70 01/28/2022    AST 27 01/28/2022    ALT 18 01/28/2022     Lab Results   Component Value Date    CRP 11.5 (H) 01/27/2022     Lab Results   Component Value Date    SEDRATE 45 (H) 01/27/2022     Radiology:      Microbiology:     Recent Labs     01/27/22  1127 01/29/22  2205   PROCAL 0.09* 0.07       ASSESSMENT:  · SARS-CoV-2 infection  · Right lung cavitary lesions with intracavitary lesion 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 Rodriguez Beard MD  9:23 AM  1/30/2022

## 2022-01-30 NOTE — PROGRESS NOTES
Physician Progress Note      Deja Hood  [facility] #:                  [id number]  :                       1945  ADMIT DATE:       2022 11:53 AM  [address] [place] DATE:  RESPONDING  PROVIDER #:        Tawanna Barton MD          QUERY TEXT:    Dr. Chey Calderon,    Patient admitted with Michele. Noted documentation of acute respiratory failure   in H&P on . In order to support the diagnosis of acute respiratory   failure, please include additional clinical indicators in your documentation. Or please document if the diagnosis of acute respiratory failure has been   ruled out after further study. The medical record reflects the following:  Risk Factors: COVID +, concern for Aspergilloma  Clinical Indicators: documented respirations 16-18, SpO2 86% on room air in   ED, per Pulmonology \"She was walked in the ED and maintained pox > 92% at rest   and with activity\" nursing progress notes states \"While ambulating on room   air patient's SpO2 was between %. Marquis Karson Ty Patient reported that she didn't feel   that SOB\"  Treatment: Patient placed on 2L O2 for a short period of time, currently on   room air, Albuterol nebulizer treatments    Thank you,  Meryle Ralph, RN  Clinical Documentation Improvement  [phone]        Acute Respiratory Failure Clinical Indicators per [employer] Training Guide and   Quick Reference Guide:  pO2 < 60 mmHg or SpO2 (pulse oximetry) < 91% breathing room air  pCO2 > 50 and pH < 7.35  P/F ratio (pO2 / FIO2) < 300  pO2 decrease or pCO2 increase by 10 mmHg from baseline (if known)  Supplemental oxygen of 40% or more  Presence of respiratory distress, tachypnea, dyspnea, shortness of breath,   wheezing  Unable to speak in complete sentences  Use of accessory muscles to breathe  Extreme anxiety and feeling of impending doom  Tripod position  Confusion/altered mental status/obtunded  Options provided:  -- Acute Respiratory Failure as evidenced by, Please document evidence.   -- Acute Respiratory Failure ruled out after study  -- Other - I will add my own diagnosis  -- Disagree - Not applicable / Not valid  -- Disagree - Clinically unable to determine / Unknown  -- Refer to Clinical Documentation Reviewer    PROVIDER RESPONSE TEXT:    This patient is in acute respiratory failure as evidenced by documented 86%   sat    Query created by: Gurdepe Kurtz on 1/29/2022 10:52 AM      Electronically signed by:  Myranda Yu MD 1/30/2022 1:15 PM

## 2022-01-31 VITALS
HEART RATE: 85 BPM | WEIGHT: 184.8 LBS | DIASTOLIC BLOOD PRESSURE: 63 MMHG | HEIGHT: 66 IN | SYSTOLIC BLOOD PRESSURE: 142 MMHG | BODY MASS INDEX: 29.7 KG/M2 | TEMPERATURE: 97.8 F | OXYGEN SATURATION: 97 % | RESPIRATION RATE: 16 BRPM

## 2022-01-31 LAB
ANISOCYTOSIS: ABNORMAL
BASOPHILS ABSOLUTE: 0 E9/L (ref 0–0.2)
BASOPHILS RELATIVE PERCENT: 0 % (ref 0–2)
CRYPTOCOCCAL ANTIGEN: NEGATIVE
EOSINOPHILS ABSOLUTE: 0 E9/L (ref 0.05–0.5)
EOSINOPHILS RELATIVE PERCENT: 0 % (ref 0–6)
HCT VFR BLD CALC: 33.5 % (ref 34–48)
HEMOGLOBIN: 10.1 G/DL (ref 11.5–15.5)
IMMATURE GRANULOCYTES #: 0.04 E9/L
IMMATURE GRANULOCYTES %: 0.6 % (ref 0–5)
LYMPHOCYTES ABSOLUTE: 0.23 E9/L (ref 1.5–4)
LYMPHOCYTES RELATIVE PERCENT: 3.5 % (ref 20–42)
MCH RBC QN AUTO: 27.4 PG (ref 26–35)
MCHC RBC AUTO-ENTMCNC: 30.1 % (ref 32–34.5)
MCV RBC AUTO: 90.8 FL (ref 80–99.9)
MONOCYTES ABSOLUTE: 0.31 E9/L (ref 0.1–0.95)
MONOCYTES RELATIVE PERCENT: 4.7 % (ref 2–12)
NEUTROPHILS ABSOLUTE: 6 E9/L (ref 1.8–7.3)
NEUTROPHILS RELATIVE PERCENT: 91.2 % (ref 43–80)
OVALOCYTES: ABNORMAL
PDW BLD-RTO: 15.3 FL (ref 11.5–15)
PLATELET # BLD: 86 E9/L (ref 130–450)
PLATELET CONFIRMATION: NORMAL
PMV BLD AUTO: 12.7 FL (ref 7–12)
POIKILOCYTES: ABNORMAL
RBC # BLD: 3.69 E12/L (ref 3.5–5.5)
SARS-COV-2, NAAT: DETECTED
TEAR DROP CELLS: ABNORMAL
WBC # BLD: 6.6 E9/L (ref 4.5–11.5)

## 2022-01-31 PROCEDURE — 2580000003 HC RX 258: Performed by: NURSE PRACTITIONER

## 2022-01-31 PROCEDURE — 87206 SMEAR FLUORESCENT/ACID STAI: CPT

## 2022-01-31 PROCEDURE — 6370000000 HC RX 637 (ALT 250 FOR IP): Performed by: INTERNAL MEDICINE

## 2022-01-31 PROCEDURE — 6370000000 HC RX 637 (ALT 250 FOR IP): Performed by: SPECIALIST

## 2022-01-31 PROCEDURE — 87070 CULTURE OTHR SPECIMN AEROBIC: CPT

## 2022-01-31 PROCEDURE — 85025 COMPLETE CBC W/AUTO DIFF WBC: CPT

## 2022-01-31 PROCEDURE — 6360000002 HC RX W HCPCS: Performed by: NURSE PRACTITIONER

## 2022-01-31 PROCEDURE — 94640 AIRWAY INHALATION TREATMENT: CPT

## 2022-01-31 PROCEDURE — 87635 SARS-COV-2 COVID-19 AMP PRB: CPT

## 2022-01-31 PROCEDURE — 87449 NOS EACH ORGANISM AG IA: CPT

## 2022-01-31 PROCEDURE — 6360000002 HC RX W HCPCS: Performed by: INTERNAL MEDICINE

## 2022-01-31 RX ORDER — ITRACONAZOLE 10 MG/ML
200 SOLUTION ORAL 2 TIMES DAILY
Qty: 1120 ML | Refills: 0 | Status: SHIPPED | OUTPATIENT
Start: 2022-01-31 | End: 2022-01-31

## 2022-01-31 RX ORDER — ITRACONAZOLE 10 MG/ML
200 SOLUTION ORAL 2 TIMES DAILY
Qty: 1120 ML | Refills: 2 | Status: SHIPPED | OUTPATIENT
Start: 2022-01-31 | End: 2022-01-31 | Stop reason: HOSPADM

## 2022-01-31 RX ORDER — ITRACONAZOLE 10 MG/ML
200 SOLUTION ORAL 2 TIMES DAILY
Status: DISCONTINUED | OUTPATIENT
Start: 2022-01-31 | End: 2022-01-31 | Stop reason: HOSPADM

## 2022-01-31 RX ORDER — ITRACONAZOLE 100 MG/1
200 CAPSULE ORAL 2 TIMES DAILY
Qty: 112 CAPSULE | Refills: 0 | Status: SHIPPED | OUTPATIENT
Start: 2022-01-31 | End: 2022-02-28

## 2022-01-31 RX ADMIN — ALBUTEROL SULFATE 2.5 MG: 2.5 SOLUTION RESPIRATORY (INHALATION) at 12:55

## 2022-01-31 RX ADMIN — LEFLUNOMIDE 20 MG: 20 TABLET ORAL at 09:17

## 2022-01-31 RX ADMIN — ALBUTEROL SULFATE 2.5 MG: 2.5 SOLUTION RESPIRATORY (INHALATION) at 08:23

## 2022-01-31 RX ADMIN — SODIUM CHLORIDE SOLN NEBU 3% 4 ML: 3 NEBU SOLN at 08:22

## 2022-01-31 RX ADMIN — ENOXAPARIN SODIUM 30 MG: 100 INJECTION SUBCUTANEOUS at 09:17

## 2022-01-31 RX ADMIN — DEXAMETHASONE 6 MG: 4 TABLET ORAL at 09:18

## 2022-01-31 RX ADMIN — ITRACONAZOLE 200 MG: 10 SOLUTION ORAL at 15:22

## 2022-01-31 RX ADMIN — METOPROLOL SUCCINATE 50 MG: 50 TABLET, EXTENDED RELEASE ORAL at 09:18

## 2022-01-31 RX ADMIN — Medication 2000 UNITS: at 09:18

## 2022-01-31 RX ADMIN — FERROUS SULFATE TAB 325 MG (65 MG ELEMENTAL FE) 325 MG: 325 (65 FE) TAB at 09:18

## 2022-01-31 RX ADMIN — LEVOTHYROXINE SODIUM 25 MCG: 25 TABLET ORAL at 05:13

## 2022-01-31 ASSESSMENT — PAIN SCALES - GENERAL: PAINLEVEL_OUTOF10: 0

## 2022-01-31 NOTE — PROGRESS NOTES
Itraconazole script faxed to [facility] ([facility]) outpatient pharmacy to check price for discharge. [facility] unable to fill itraconazole, call placed to [employer] to check cost and availability. [address] [place] will be able to fill medication tomorrow. Script faxed to check price.

## 2022-01-31 NOTE — PROGRESS NOTES
Pulmonary Progress Note    Admit Date: 2022  Requesting Physician: Sara Layton MD    SUBJECTIVE:  Feels great. Wants to go home  On room air  No dyspnea, still with cough with pale yellow sputum    Medications:       [START ON 2022] dexamethasone  4 mg Oral Daily    [START ON 2022] dexamethasone  2 mg Oral Daily    albuterol  2.5 mg Nebulization 4x daily    sodium chloride (Inhalant)  4 mL Nebulization BID    anidulafungin  100 mg IntraVENous Q24H    levothyroxine  25 mcg Oral Daily    ferrous sulfate  325 mg Oral Daily with breakfast    vitamin D  50,000 Units Oral Q14 Days    metoprolol succinate  50 mg Oral Daily    rosuvastatin  10 mg Oral Nightly    losartan  50 mg Oral QPM    aspirin  325 mg Oral Daily    leflunomide  20 mg Oral Once per day on     enoxaparin  30 mg SubCUTAneous BID    dexamethasone  6 mg Oral Daily    Vitamin D  2,000 Units Oral Daily    remdesivir IVPB  100 mg IntraVENous Q24H         Vitals:    VITALS:  BP (!) 142/63   Pulse 85   Temp 97.8 °F (36.6 °C) (Oral)   Resp 16   Ht 5' 6\" (1.676 m)   Wt 184 lb 12.8 oz (83.8 kg)   SpO2 95%   BMI 29.83 kg/m²   24HR INTAKE/OUTPUT:  No intake or output data in the 24 hours ending 22 1119  CURRENT PULSE OXIMETRY:  SpO2: 95 %  24HR PULSE OXIMETRY RANGE:  SpO2  Av.4 %  Min: 91 %  Max: 99 %      EXAM:  General: No distress. Eyes:  No sclera icterus. No conjunctival injection. ENT: No discharge. Pharynx clear. Neck: Trachea midline. Normal thyroid. Resp: No accessory muscle use. Diminished. No crackles. No wheezing. No rhonchi. CV: Regular rate. Regular rhythm. No mumur or rub. ABD: Non-tender. Non-distended. No masses. No organmegaly. Normal bowel sounds. Skin: Warm and dry. No nodule on exposed extremities. No rash on exposed extremities. Lymph: No cervical LAD. No supraclavicular LAD. Ext: No joint deformity. No clubbing. No cyanosis. No edema  Neuro: Awake.  Follows commands Lab Results:  CBC:   No results for input(s): WBC, HGB, HCT, MCV, PLT in the last 72 hours. BMP:   No results for input(s): NA, K, CL, CO2, PHOS, BUN, CREATININE, CA in the last 72 hours. LFT:   No results for input(s): ALKPHOS, ALT, AST, PROT, BILITOT, BILIDIR, LABALBU in the last 72 hours. PT/INR: No results for input(s): PROTIME, INR in the last 72 hours. Cultures:  No results for input(s): CULTRESP in the last 72 hours. ABG:   No results for input(s): PH, PO2, PCO2, HCO3, BE, O2SAT in the last 72 hours. Films:  XR CHEST  1/27/2022  : The lungs are without acute focal process. There is no effusion or pneumothorax. The cardiomediastinal silhouette is without acute process. The osseous structures are without acute process. No acute process. CTA PULMONARY 1/27/2022: Pulmonary Arteries: Pulmonary arteries are adequately opacified for evaluation. No evidence of intraluminal filling defect to suggest pulmonary embolism. Main pulmonary artery is normal in caliber. Mediastinum: No evidence of mediastinal lymphadenopathy. The heart and pericardium demonstrate no acute abnormality. There is no acute abnormality of the thoracic aorta. Lungs/pleura: The lungs demonstrate multifocal patchy opacities mostly in the posterior lungs bilaterally. In addition there are multiple cavitating lesions measuring up to 2.1 cm at the right posterior lung base. At least 1 of these cavitating lesions contain internal nodule. No evidence of pleural effusion or pneumothorax. Upper Abdomen: Limited images of the upper abdomen are unremarkable. Soft Tissues/Bones: No acute bone or soft tissue abnormality. No evidence of pulmonary embolism. Mild multifocal patchy opacities in both lungs suspicious for atypical or COVID related pneumonia. In addition, there are multiple small cavitating lesions at the posterior lung bases, some with internal nodules which may be seen in aspergilloma.   Please correlate with clinical presentation and consider pulmonary consultation if clinically appropriate. Critical results were called by Dr. Mindi Pike to Dr. Howie Hunter On 1/27/2022 at 18:32. Assessment:  · Abnormal chest ct  · Covid-19      Plan:  · On Remdesivir - should complete today. · ID following. On Eraxis  · On Dexamethasone 6mg daily - will taper for d/c  · On room air and doing well. · PCT neg 0.07. No documented respiratory panel. Will check legionella and strep antigens - not done. · Sputum obtained but not in computer - will reorder. Continue albuterol and sodium chloride nebs to aid this, feels better after. · Continue IS  · Follow inflammatory markers  D dimer was 513->390, CRP 11.5  - No labs today. · Heme/Onc following - IgG 538 - to get IVIG infusion. · Will need to follow up and schedule for bronchoscopy to look for fungal pathogens as she is immunocompromised on Arava and prednisone. · [id number] [address] for home from pulmonary perspective.  To follow up in office, call [phone]      LESTER Durbin - CNP  1/31/2022  11:19 AM    Seen and evaluated, and agree with above assessment and plan  Oxygenation improved  Dexamethasone to complete at least 10-day  Okay to discharge home from pulmonary perspective, follow-up in our office in 4 to 6 weeks

## 2022-01-31 NOTE — PROGRESS NOTES
Subjective:    Chief complaint:      Denies any issues  Inquiring about going home    Objective:    BP (!) 142/63   Pulse 85   Temp 97.8 °F (36.6 °C) (Oral)   Resp 16   Ht 5' 6\" (1.676 m)   Wt 184 lb 12.8 oz (83.8 kg)   SpO2 95%   BMI 29.83 kg/m²   General : Awake ,alert,no distress. Heart:  RRR, no murmurs, gallops, or rubs. Lungs:  CTA bilaterally, no wheeze, rales or rhonchi  Abd: bowel sounds present, nontender, nondistended, no masses  Extrem:  No clubbing, cyanosis, or edema    CBC:   Lab Results   Component Value Date    WBC 3.4 01/28/2022    RBC 3.82 01/28/2022    HGB 10.7 01/28/2022    HCT 35.4 01/28/2022    MCV 92.7 01/28/2022    MCH 28.0 01/28/2022    MCHC 30.2 01/28/2022    RDW 15.4 01/28/2022    PLT 61 01/28/2022    MPV 12.5 01/28/2022     BMP:    Lab Results   Component Value Date     01/28/2022    K 4.2 01/28/2022     01/28/2022    CO2 22 01/28/2022    BUN 15 01/28/2022    LABALBU 3.3 01/28/2022    CREATININE 0.9 01/28/2022    CALCIUM 8.1 01/28/2022    GFRAA >60 01/28/2022    LABGLOM >60 01/28/2022    GLUCOSE 193 01/28/2022     PT/INR:    Lab Results   Component Value Date    PROTIME 14.0 06/02/2016    INR 1.3 06/02/2016     Troponin:    Lab Results   Component Value Date    TROPONINI 0.03 06/03/2016       No results for input(s): LABURIN in the last 72 hours. No results for input(s): BC in the last 72 hours. No results for input(s): Lawrnce Diones in the last 72 hours.       Current Facility-Administered Medications:     [START ON 2/2/2022] dexamethasone (DECADRON) tablet 4 mg, 4 mg, Oral, Daily, Spavin[place]LESTER Almazan CNP    [START ON 2/7/2022] dexamethasone (DECADRON) tablet 2 mg, 2 mg, Oral, Daily, LESTER Coppola CNP    albuterol (PROVENTIL) nebulizer solution 2.5 mg, 2.5 mg, Nebulization, 4x daily, LESTER Estrada CNP, 2.5 mg at 01/31/22 8587    sodium chloride (Inhalant) 3 % nebulizer solution 4 mL, 4 mL, Nebulization, BID, LESTER Dacosta - CNP, 4 mL at 01/31/22 0822    [COMPLETED] anidulafungin (ERAXIS) 200 mg in dextrose 5 % 260 mL IVPB, 200 mg, IntraVENous, Once, Stopped at 01/28/22 2048 **AND** anidulafungin (ERAXIS) 100 mg in dextrose 5 % 130 mL IVPB, 100 mg, IntraVENous, Q24H, Mitesh Mensah MD, Stopped at 01/30/22 1745    levothyroxine (SYNTHROID) tablet 25 mcg, 25 mcg, Oral, Daily, Nichol Schwartz MD, 25 mcg at 01/31/22 0004    ferrous sulfate (IRON 325) tablet 325 mg, 325 mg, Oral, Daily with breakfast, Nichol Schwartz MD, 325 mg at 01/31/22 9432    vitamin D (CHOLECALCIFEROL) CAPS 50,000 Units, 50,000 Units, Oral, Q14 Days, Nichol Schwartz MD    metoprolol succinate (TOPROL XL) extended release tablet 50 mg, 50 mg, Oral, Daily, Nichol Schwartz MD, 50 mg at 01/31/22 1180    rosuvastatin (CRESTOR) tablet 10 mg, 10 mg, Oral, Nightly, Nichol Schwartz MD, 10 mg at 01/30/22 2008    losartan (COZAAR) tablet 50 mg, 50 mg, Oral, QPM, Nichol Schwartz MD, 50 mg at 01/30/22 2007    aspirin EC tablet 325 mg, 325 mg, Oral, Daily, Nichol Schwartz MD, 325 mg at 01/30/22 2007    leflunomide (ARAVA) tablet 20 mg, 20 mg, Oral, Once per day on Mon Wed Fri, Nichol Schwartz MD, 20 mg at 01/31/22 0697    acetaminophen (TYLENOL) tablet 650 mg, 650 mg, Oral, Q6H PRN **OR** acetaminophen (TYLENOL) suppository 650 mg, 650 mg, Rectal, Q6H PRN, Nichol Schwartz MD    enoxaparin (LOVENOX) injection 30 mg, 30 mg, SubCUTAneous, BID, Nichol Schwartz MD, 30 mg at 01/31/22 7212    dexamethasone (DECADRON) tablet 6 mg, 6 mg, Oral, Daily, LESTER Ng - CNP, 6 mg at 01/31/22 2251    Vitamin D (CHOLECALCIFEROL) tablet 2,000 Units, 2,000 Units, Oral, Daily, Nichol Schwartz MD, 2,000 Units at 01/31/22 0918    [COMPLETED] remdesivir 200 mg in sodium chloride 0.9 % 250 mL IVPB, 200 mg, IntraVENous, Once, Stopped at 01/27/22 6340 **FOLLOWED BY** remdesivir 100 mg in sodium chloride 0.9 % 250 mL IVPB, 100 mg, IntraVENous, Q24H, Pramod Cisse MD, Stopped at 01/30/22 2155    0.9 % sodium chloride bolus, 30 mL, IntraVENous, PRN, Pramod Cisse MD, Stopped at 01/29/22 2134    ADULT DIET; Regular    CTA PULMONARY W CONTRAST   Final Result   No evidence of pulmonary embolism. Mild multifocal patchy opacities in both lungs suspicious for atypical or   COVID related pneumonia. In addition, there are multiple small cavitating   lesions at the posterior lung bases, some with internal nodules which may be   seen in aspergilloma. Please correlate with clinical presentation and   consider pulmonary consultation if clinically appropriate. Critical results were called by Dr. Dia Zepeda to Dr. Eric Quinonez On 1/27/2022 at   18:32. XR CHEST PORTABLE   Final Result   No acute process. Assessment:    Active Problems:    Acute hypoxemic respiratory failure due to COVID-19 [facility])  Resolved Problems:    * No resolved hospital problems. 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  Every effort was made to ensure accuracy; however, inadvertent transcription errors may be present

## 2022-01-31 NOTE — PROGRESS NOTES
Spoke with Rachelle, cost of liquid itraconazole would be over $700 dollars to fill. Dr. Ramya Blandon updated. Check cost of itraconazole pill instead of the liquid.

## 2022-01-31 NOTE — PROGRESS NOTES
[address] [place] [address] [facility]  Progress Note    SUBJECTIVE:  Chief Complaint   Patient presents with    Positive For Covid-19     sent in by pcp for tachycardia, hr in 130's in triage, dizziness    Shortness of Breath     No new complaints. Tolerating antifungal.  No nausea vomiting  Afebrile  Room air    Review of systems:  As stated above in the chief complaint, otherwise negative. Medications:  Scheduled Meds:   [START ON 2022] dexamethasone  4 mg Oral Daily    [START ON 2022] dexamethasone  2 mg Oral Daily    albuterol  2.5 mg Nebulization 4x daily    sodium chloride (Inhalant)  4 mL Nebulization BID    anidulafungin  100 mg IntraVENous Q24H    levothyroxine  25 mcg Oral Daily    ferrous sulfate  325 mg Oral Daily with breakfast    vitamin D  50,000 Units Oral Q14 Days    metoprolol succinate  50 mg Oral Daily    rosuvastatin  10 mg Oral Nightly    losartan  50 mg Oral QPM    aspirin  325 mg Oral Daily    leflunomide  20 mg Oral Once per day on     enoxaparin  30 mg SubCUTAneous BID    dexamethasone  6 mg Oral Daily    Vitamin D  2,000 Units Oral Daily    remdesivir IVPB  100 mg IntraVENous Q24H     Continuous Infusions:  PRN Meds:acetaminophen **OR** acetaminophen, sodium chloride    OBJECTIVE:  BP (!) 142/63   Pulse 85   Temp 97.8 °F (36.6 °C) (Oral)   Resp 16   Ht 5' 6\" (1.676 m)   Wt 184 lb 12.8 oz (83.8 kg)   SpO2 95%   BMI 29.83 kg/m²   Temp  Av.7 °F (36.5 °C)  Min: 97.4 °F (36.3 °C)  Max: 98.1 °F (36.7 °C)  Constitutional: The patient is awake, alert, and oriented. No distress. Skin: Warm and dry. No rashes were noted. HEENT: Round and reactive pupils. Moist mucous membranes. No ulcerations or thrush. Neck: Supple to movements. Chest: No respiratory distress. No crackles. Cardiovascular: Heart sounds rhythmic and regular. Abdomen: Positive bowel sounds to auscultation. Benign to palpation.    Extremities: No edema.   Lines: peripheral    Laboratory and Tests Review:  Lab Results   Component Value Date    WBC 3.4 (L) 01/28/2022    WBC 6.0 01/27/2022    WBC 8.0 06/02/2016    HGB 10.7 (L) 01/28/2022    HCT 35.4 01/28/2022    MCV 92.7 01/28/2022    PLT 61 (L) 01/28/2022     Lab Results   Component Value Date    NEUTROABS 2.90 01/28/2022    NEUTROABS 4.82 01/27/2022    NEUTROABS 6.30 06/02/2016     No results found for: [facility]  Lab Results   Component Value Date    ALT 18 01/28/2022    AST 27 01/28/2022    ALKPHOS 70 01/28/2022    BILITOT 1.0 01/28/2022     Lab Results   Component Value Date     01/28/2022    K 4.2 01/28/2022     01/28/2022    CO2 22 01/28/2022    BUN 15 01/28/2022    CREATININE 0.9 01/28/2022    CREATININE 1.0 01/27/2022    CREATININE 1.0 06/04/2016    GFRAA >60 01/28/2022    LABGLOM >60 01/28/2022    GLUCOSE 193 01/28/2022    PROT 5.3 01/28/2022    LABALBU 3.3 01/28/2022    CALCIUM 8.1 01/28/2022    BILITOT 1.0 01/28/2022    ALKPHOS 70 01/28/2022    AST 27 01/28/2022    ALT 18 01/28/2022     Lab Results   Component Value Date    CRP 11.5 (H) 01/27/2022     Lab Results   Component Value Date    SEDRATE 45 (H) 01/27/2022     Radiology:      Microbiology:     Recent Labs     01/29/22  2205   PROCAL 0.07       ASSESSMENT:  · SARS-CoV-2 infection  · Right lung cavitary lesions with intracavitary lesion and possible aspergilloma  · Leukopenia    PLAN:  · Continue Remdesivir and dexamethasone  · Continue Anudilafungin  · 1-3 beta glucan pending  · Galactomannan pending  · Histoplasma urine antigen and serologies for fungus sent out  · Check final cultures  · Possible discharge constitutional:      Vee Duval MD  1:27 PM  1/31/2022

## 2022-01-31 NOTE — ACP (ADVANCE CARE PLANNING)
1/31/2022  Advance Care Planning     Advance Care Planning Activator (Inpatient)  Conversation Note      Date of ACP Conversation: 1/31/2022     [employer] with: Dom Hardy    ACP Activator: JL De Paz        Health Care Decision Maker:     Current Designated Health Care Decision Maker: Yakelin Noriega    Click here to complete Healthcare Decision Makers including section of the Healthcare Decision Maker Relationship (ie \"Primary\")      Care Preferences    Ventilation: \"If you were in your present state of health and suddenly became very ill and were unable to breathe on your own, what would your preference be about the use of a ventilator (breathing machine) if it were available to you? \"      Would the patient desire the use of ventilator (breathing machine)?: yes    \"If your health worsens and it becomes clear that your chance of recovery is unlikely, what would your preference be about the use of a ventilator (breathing machine) if it were available to you? \"     Would the patient desire the use of ventilator (breathing machine)?: no      Resuscitation  \"CPR works best to restart the heart when there is a sudden event, like a heart attack, in someone who is otherwise healthy. Unfortunately, CPR does not typically restart the heart for people who have serious health conditions or who are very sick. \"    \"In the event your heart stopped as a result of an underlying serious health condition, would you want attempts to be made to restart your heart (answer \"yes\" for attempt to resuscitate) or would you prefer a natural death (answer \"no\" for do not attempt to resuscitate)? \" yes       [] Yes   [x] No   Educated Patient / Consuelo Farmer regarding differences between Advance Directives and portable DNR orders.     Length of ACP Conversation in minutes:  10 min  Conversation Outcomes:  [x] ACP discussion completed  [] Existing advance directive reviewed with patient; no changes to patient's previously recorded wishes  [] New Advance Directive completed  [] Portable Do Not Rescitate prepared for Provider review and signature  [] POLST/POST/MOLST/MOST prepared for Provider review and signature      Follow-up plan:    [] Schedule follow-up conversation to continue planning  [x] Referred individual to Provider for additional questions/concerns   [] Advised patient/agent/surrogate to review completed ACP document and update if needed with changes in condition, patient preferences or care setting    [] This note routed to one or more involved healthcare providers

## 2022-01-31 NOTE — PROGRESS NOTES
Valencia Longoria will be able to fill itraconazole script for $93.24. Patient agreeable to cost. Notified patient that medication has to be special ordered and will be available for pickup tomorrow. She is agreeable to pickup.

## 2022-01-31 NOTE — PROGRESS NOTES
Subjective:  Chart reviewed, discussed with bedside RN  Patient seen at bedside  S/p IVIG 1/30  The patient is awake and alert. No problems overnight  Tolerating diet. Denies chest pain, angina, dyspnea, abdominal discomfort, nausea/vomiting and diarrhea/constipation. Wants to go home    Objective:    BP (!) 142/63   Pulse 85   Temp 97.8 °F (36.6 °C) (Oral)   Resp 16   Ht 5' 6\" (1.676 m)   Wt 184 lb 12.8 oz (83.8 kg)   SpO2 95%   BMI 29.83 kg/m²   Patient in [address] isolation and patient seen from outside room due to facility recommendations.     General: NAD, awake and alert  HEENT: normocephalic/atraumatic, sclera anicteric  NECK: supple, trachea midline  Heart:  RRR  Lungs:  Patient on RA, respirations easy  Extrem:  No clubbing, cyanosis, or edema  Skin: pale, Intact, no petechia or purpura    CBC with Differential:    Lab Results   Component Value Date    WBC 3.4 01/28/2022    RBC 3.82 01/28/2022    HGB 10.7 01/28/2022    HCT 35.4 01/28/2022    PLT 61 01/28/2022    MCV 92.7 01/28/2022    MCH 28.0 01/28/2022    MCHC 30.2 01/28/2022    RDW 15.4 01/28/2022    LYMPHOPCT 10.4 01/28/2022    MONOPCT 2.7 01/28/2022    BASOPCT 0.0 01/28/2022    MONOSABS 0.09 01/28/2022    LYMPHSABS 0.35 01/28/2022    EOSABS 0.00 01/28/2022    BASOSABS 0.00 01/28/2022     CMP:    Lab Results   Component Value Date     01/28/2022    K 4.2 01/28/2022     01/28/2022    CO2 22 01/28/2022    BUN 15 01/28/2022    CREATININE 0.9 01/28/2022    GFRAA >60 01/28/2022    LABGLOM >60 01/28/2022    GLUCOSE 193 01/28/2022    PROT 5.3 01/28/2022    LABALBU 3.3 01/28/2022    CALCIUM 8.1 01/28/2022    BILITOT 1.0 01/28/2022    ALKPHOS 70 01/28/2022    AST 27 01/28/2022    ALT 18 01/28/2022          Current Facility-Administered Medications:     [START ON 2/2/2022] dexamethasone (DECADRON) tablet 4 mg, 4 mg, Oral, Daily, Adarsh Garcia, APRN - CNP    [START ON 2/7/2022] dexamethasone (DECADRON) tablet 2 mg, 2 mg, Oral, Daily, Devante Gross, LESTER - CNP    albuterol (PROVENTIL) nebulizer solution 2.5 mg, 2.5 mg, Nebulization, 4x daily, ViridianaLESTER Cuellar CNP, 2.5 mg at 01/31/22 8195    sodium chloride (Inhalant) 3 % nebulizer solution 4 mL, 4 mL, Nebulization, BID, Marta DUFF Cicchillo, APRN - CNP, 4 mL at 01/31/22 0822    [COMPLETED] anidulafungin (ERAXIS) 200 mg in dextrose 5 % 260 mL IVPB, 200 mg, IntraVENous, Once, Stopped at 01/28/22 2048 **AND** anidulafungin (ERAXIS) 100 mg in dextrose 5 % 130 mL IVPB, 100 mg, IntraVENous, Q24H, Olya Sierra MD, Stopped at 01/30/22 1745    levothyroxine (SYNTHROID) tablet 25 mcg, 25 mcg, Oral, Daily, Amilcar Thurston MD, 25 mcg at 01/31/22 0111    ferrous sulfate (IRON 325) tablet 325 mg, 325 mg, Oral, Daily with breakfast, Amilcar Thurston MD, 325 mg at 01/31/22 0194    vitamin D (CHOLECALCIFEROL) CAPS 50,000 Units, 50,000 Units, Oral, Q14 Days, Amilcar Thurston MD    metoprolol succinate (TOPROL XL) extended release tablet 50 mg, 50 mg, Oral, Daily, Amilcar Thurston MD, 50 mg at 01/31/22 3967    rosuvastatin (CRESTOR) tablet 10 mg, 10 mg, Oral, Nightly, Amilcar Thurston MD, 10 mg at 01/30/22 2008    losartan (COZAAR) tablet 50 mg, 50 mg, Oral, QPM, Amilcar Thurston MD, 50 mg at 01/30/22 2007    aspirin EC tablet 325 mg, 325 mg, Oral, Daily, Amilcar Thurston MD, 325 mg at 01/30/22 2007    leflunomide (ARAVA) tablet 20 mg, 20 mg, Oral, Once per day on Mon Wed Fri, Amilcar Thurston MD, 20 mg at 01/31/22 0615    acetaminophen (TYLENOL) tablet 650 mg, 650 mg, Oral, Q6H PRN **OR** acetaminophen (TYLENOL) suppository 650 mg, 650 mg, Rectal, Q6H PRN, Amilcar Thurston MD    enoxaparin (LOVENOX) injection 30 mg, 30 mg, SubCUTAneous, BID, Amilcar Thurston MD, 30 mg at 01/31/22 0917    dexamethasone (DECADRON) tablet 6 mg, 6 mg, Oral, Daily, LESTER Christine CNP, 6 mg at 01/31/22 5656    Vitamin D (CHOLECALCIFEROL) tablet 2,000 Units, 2,000 Units, Oral, Daily, Enriqueta Concecpion MD, 2,000 Units at 01/31/22 0918    [COMPLETED] remdesivir 200 mg in sodium chloride 0.9 % 250 mL IVPB, 200 mg, IntraVENous, Once, Stopped at 01/27/22 2330 **FOLLOWED BY** remdesivir 100 mg in sodium chloride 0.9 % 250 mL IVPB, 100 mg, IntraVENous, Q24H, Enriqueta Concepcion MD, Stopped at 01/30/22 2155    0.9 % sodium chloride bolus, 30 mL, IntraVENous, PRN, Enriqueta Concepcion MD, Stopped at 01/29/22 2134    CTA PULMONARY W CONTRAST   Final Result   No evidence of pulmonary embolism. Mild multifocal patchy opacities in both lungs suspicious for atypical or   COVID related pneumonia. In addition, there are multiple small cavitating   lesions at the posterior lung bases, some with internal nodules which may be   seen in aspergilloma. Please correlate with clinical presentation and   consider pulmonary consultation if clinically appropriate. Critical results were called by Dr. Verdis Goodpasture to Dr. El Hernandez On 1/27/2022 at   18:32. XR CHEST PORTABLE   Final Result   No acute process. A/P:  78year old female known to [address] [place] [address] with anemia and mild thrombocytopenia of chronic disease with rheumatoid arthritis on Aprava for 3 years admitted with COVID pneumonia and cavitary lung nodules suspicious for aspergillosis. Anemia and thrombocytopenia worse than baseline due to acute infection    1. Anemia/thrombocytopenia - repeat CBC today  2. Cavitary lung nodules - pending results assessment for fungal,ID following treating for aspergillis pending results  3. RA    Follow-up outpatient with Dr. Maged Vasquez as scheduled next week. Thank you for allowing us to participate in the care of Sammy Leventhal. Alber Funes PA-C  [phone]    Electronically signed by JT Phipps on 1/31/2022 at 12:41 PM    Note: This report was completed using computerMiira voiced recognition software.   Every effort has been made to ensure accuracy; however, inadvertent computerized transcription errors may be present.

## 2022-01-31 NOTE — CARE COORDINATION
2022 Social Work Discharge Planning:COVID POS. Pt resides with and is caregiver for her sister in law. Pts spouse is . Pt is on room air. No needs.  Electronically signed by JL Loera on 2022 at 9:35 AM

## 2022-02-02 LAB
ASPERGILLUS GALACTO AG: NEGATIVE
ASPERGILLUS GALACTO INDEX: 0.07
CULTURE, RESPIRATORY: NORMAL
Lab: NORMAL
REPORT: NORMAL
SMEAR, RESPIRATORY: NORMAL
THIS TEST SENT TO: NORMAL

## 2022-02-03 LAB
Lab: NORMAL
REPORT: NORMAL
THIS TEST SENT TO: NORMAL

## 2022-02-15 ENCOUNTER — HOSPITAL ENCOUNTER (OUTPATIENT)
Dept: CT IMAGING | Age: 77
Discharge: HOME | End: 2022-02-17
Payer: MEDICARE

## 2022-02-15 DIAGNOSIS — J98.4 CAVITARY LESION OF LUNG: ICD-10-CM

## 2022-02-15 DIAGNOSIS — B44.9 ASPERGILLOSIS (HCC): ICD-10-CM

## 2022-02-15 PROCEDURE — 71260 CT THORAX DX C+: CPT

## 2022-02-15 PROCEDURE — 6360000004 HC RX CONTRAST MEDICATION: Performed by: RADIOLOGY

## 2022-02-15 RX ADMIN — IOPAMIDOL 75 ML: 755 INJECTION, SOLUTION INTRAVENOUS at 08:40

## 2022-02-17 ENCOUNTER — TELEPHONE (OUTPATIENT)
Dept: INFECTIOUS DISEASES | Age: 77
End: 2022-02-17

## 2022-02-17 DIAGNOSIS — B44.9 ASPERGILLOSIS (HCC): Primary | ICD-10-CM

## 2022-02-17 NOTE — TELEPHONE ENCOUNTER
CT chest reviewed - lesions are decreasing but still noted     Discussed with Dr Tima Rodriguez   He will discuss with Dr Juliana oCrado   Patient to follow up with [facility] in 1-2 weeks   Continue on Eraxis until ok to DC by Reese/Jeanette     Possibly cavitary lesions are due to COVID

## 2022-02-22 NOTE — DISCHARGE SUMMARY
Physician Discharge Summary     Patient ID:  Yadira Baker  [id number]  32 y.o.  1945    Admit date: 1/27/2022    Discharge date and time: 1/31/2022  4:26 PM     Admission Diagnoses: Active Problems:    Acute hypoxemic respiratory failure due to COVID-19 [facility])  Resolved Problems:    * No resolved hospital problems. *      Discharge Diagnoses: Active Problems:    Acute hypoxemic respiratory failure due to COVID-19 [facility])  Resolved Problems:    * No resolved hospital problems. *      Condition at discharge : Stable    Consults: IP CONSULT TO PULMONOLOGY  IP CONSULT TO PHARMACY  IP CONSULT TO INFECTIOUS DISEASES  IP CONSULT TO ONCOLOGY    Procedures: None    Hospital Course: 68-year-old lady sent in by local urgent care because of positive Covid test, hypoxia. In the emergency room patient was noted to be hypoxic and tachycardic. CT scan of the chest revealed pneumonic process. She was then admitted for further evaluation and treatment. There was also question of possible aspergilloma's. Pulmonary was consulted. Infectious disease was also consulted. Oxygen supplementation started. Dexamethasone started. Remdesivir started. Inflammatory markers were trended. Infectious disease felt aspergilloma is a possibility. She was also given IVIG. She was noted to have pancytopenia. Hematology saw the patient as well. She was treated with IV Eraxis. Subsequently discharged home with itraconazole. CTA PULMONARY W CONTRAST   Final Result   No evidence of pulmonary embolism. Mild multifocal patchy opacities in both lungs suspicious for atypical or   COVID related pneumonia. In addition, there are multiple small cavitating   lesions at the posterior lung bases, some with internal nodules which may be   seen in aspergilloma. Please correlate with clinical presentation and   consider pulmonary consultation if clinically appropriate.       Critical results were called by Dr. Jenni Ceballos to Dr. Keyla Kuo On 1/27/2022 at   18:32. XR CHEST PORTABLE   Final Result   No acute process. No results found for this or any previous visit (from the past 336 hour(s)).       Discharge Exam:  See progress note from today    Disposition: Home    Patient Instructions:   Discharge Medication List as of 1/31/2022  3:43 PM      START taking these medications    Details   itraconazole (SPORANOX) 100 MG capsule Take 2 capsules by mouth 2 times daily for 28 days, Disp-112 capsule, R-0Normal         CONTINUE these medications which have NOT CHANGED    Details   loperamide (IMODIUM) 2 MG capsule Take 2 mg by mouth 4 times daily as needed for DiarrheaHistorical Med      Krill Oil (MAXIMUM RED KRILL PO) Take by mouth STOP PREOP MEDHistorical Med      Calcium Carbonate-Vitamin D (CALCIUM 600+D PO) Take by mouth STOP PREOP MEDHistorical Med      Cetirizine HCl (ZYRTEC PO) Take by mouth every eveningHistorical Med      aspirin 325 MG EC tablet Take 325 mg by mouth daily STOP PREOP MED per dr Ness Barrera Med      leflunomide (ARAVA) 20 MG tablet Take 20 mg by mouth three times a week STOP PREOP MEDHistorical Med      predniSONE (DELTASONE) 5 MG tablet Take 5 mg by mouth four times a week Takes Tuesday, Thursday, Saturday, SundayHistorical Med      folic acid (FOLVITE) 1 MG tablet Take 1 mg by mouthHistorical Med      losartan (COZAAR) 50 MG tablet Take 50 mg by mouth every evening Historical Med      rosuvastatin (CRESTOR) 10 MG tablet Take 1 tablet by mouth nightly, Disp-30 tablet, R-0      levothyroxine (SYNTHROID) 25 MCG tablet Take 25 mcg by mouth three times a week Indications: every other day Historical Med      ferrous sulfate 325 (65 FE) MG tablet Take 325 mg by mouth daily (with breakfast) Historical Med      vitamin D (CHOLECALCIFEROL) 49089 UNIT CAPS Take 50,000 Units by mouth every 14 days Indications: every 2 weeks Historical Med      metoprolol (TOPROL-XL) 50 MG XL tablet Take 50 mg by mouth daily      Coenzyme Q10 (CO Q-10) 400 MG CAPS Take 400 mg by mouth      !! Multiple Vitamins-Minerals (CENTRUM SILVER PO) Take 1 tablet by mouth      !! Multiple Vitamins-Minerals (OCUVITE PO) Take 1 tablet by mouth       !! - Potential duplicate medications found. Please discuss with provider. STOP taking these medications       itraconazole (SPORANOX) 10 MG/ML solution Comments:   Reason for Stopping:               Activity: As tolerated    Diet: Cardiac    Follow-up with Jeff Hurt MD  [facility], [address] [address],[address]  [facility]    Schedule an appointment as soon as possible for a visit  For follow-up    Shemar Cartagena MD  [address] [phone]    Schedule an appointment as soon as possible for a visit  For follow-up    Clinton Painter MD  [address] [place], [place] [id number] [phone]    Go to  Appointment 2/4/22 already scheduled, follow-up at this appointment.         Note that over 30 minutes was spent in preparing discharge papers, discussing discharge with patient, medication review, etc.    Signed:  Елена Abdalla MD  2/22/2022  3:32 PM

## 2022-04-16 ENCOUNTER — HOSPITAL ENCOUNTER (OUTPATIENT)
Age: 77
Discharge: HOME | End: 2022-04-16
Payer: MEDICARE

## 2022-04-16 ENCOUNTER — HOSPITAL ENCOUNTER (OUTPATIENT)
Dept: CT IMAGING | Age: 77
Discharge: HOME | End: 2022-04-18
Payer: MEDICARE

## 2022-04-16 DIAGNOSIS — J98.4 DISEASE OF LUNG: ICD-10-CM

## 2022-04-16 DIAGNOSIS — J18.9 UNRESOLVED PNEUMONIA: ICD-10-CM

## 2022-04-16 DIAGNOSIS — R91.1 COIN LESION: ICD-10-CM

## 2022-04-16 LAB
BUN BLDV-MCNC: 12 MG/DL (ref 6–23)
CREAT SERPL-MCNC: 0.8 MG/DL (ref 0.5–1)
GFR AFRICAN AMERICAN: >60
GFR NON-AFRICAN AMERICAN: >60 ML/MIN/1.73

## 2022-04-16 PROCEDURE — 36415 COLL VENOUS BLD VENIPUNCTURE: CPT

## 2022-04-16 PROCEDURE — 84520 ASSAY OF UREA NITROGEN: CPT

## 2022-04-16 PROCEDURE — 71260 CT THORAX DX C+: CPT

## 2022-04-16 PROCEDURE — 82565 ASSAY OF CREATININE: CPT

## 2022-04-16 PROCEDURE — 6360000004 HC RX CONTRAST MEDICATION: Performed by: RADIOLOGY

## 2022-04-16 RX ADMIN — IOPAMIDOL 75 ML: 755 INJECTION, SOLUTION INTRAVENOUS at 09:22

## 2022-05-27 NOTE — PROGRESS NOTES
Aleisha 38 PRE-ADMISSION TESTING GENERAL INSTRUCTIONS- [facility]-phone number:[phone]    GENERAL INSTRUCTIONS  [x] Antibacterial Soap shower Night before and/or AM of Surgery    [x] Nothing by mouth after midnight, including gum, candy, mints, or water. [x] You may brush your teeth, gargle, but do NOT swallow water. [x]No smoking, chewing tobacco, illegal drugs, marijuana or alcohol within 24 hours of your surgery. [x] Jewelry, valuables or body piercing's should not be brought to the hospital. All body and/or tongue piercing's must be removed prior to arriving to hospital.  ALL hair pins must be removed. [x] Do not wear makeup, lotions, powders, deodorant. Nail polish as directed by the nurse. [x] Arrange transportation with a responsible adult  to and from the hospital. If you do not have a responsible adult  to transport you, you will need to make arrangements with a medical transportation company (i.e. [employer]. A Uber/taxi/bus is not appropriate unless you are accompanied by a responsible adult ). Arrange for someone to be with you for the remainder of the day and for 24 hours after your procedure due to having had anesthesia. Who will be your  for transportation? celina Brito  Who will be staying with you for 24 hrs after your procedure? celina Brito  [x] Bring insurance card and photo ID. [x] Bring copy of living will or healthcare power of  papers to be placed in your electronic record. PARKING INSTRUCTIONS:   [x] Arrival Time: 1pm,    Two people may accompany you. Everyone will need to wear a mask. Patient states will be dropped off and picked up day of surgery. Walk up the front walk to the [facility], the door will be locked an employee will greet you and let you in.        · [] Parking lot '\"I\"  is located on [address] (the corner of [facility]).   To enter, press the button and the gate will lift. A free token will be provided to exit the lot. One car per patient is allowed to park in this lot. All other cars are to park on [address] either in the parking garage or the handicap lot. EDUCATION INSTRUCTIONS:        [x]Fluoroscopy-Xray used in surgery reviewed with patient. Educational pamphlet placed in chart. [x]Pain: Post-op pain is normal and to be expected. You will be asked to rate your pain from 0-10 (a zero is not acceptable-education is needed). Your post-op pain goal is:       MEDICATION INSTRUCTIONS:  [x]Bring a complete list of your medications, please write the last time you took the medicine, give this list to the nurse. [x] Take the following medications the morning of surgery with 1-2 ounces of water:  Metoprolol       [x] Stop herbal supplements, ibuprofen (Nsaids)  and vitamins 5 days before your surgery. [x] Follow physician instructions regarding any blood thinners you may be taking. WHAT TO EXPECT:  [x] The day of surgery you will be greeted and checked in by the [employer]. Please bring your photo ID and insurance card. A nurse will greet you in accordance to the time you are needed in the pre-op area to prepare you for surgery. Please do not be discouraged if you are not greeted in the order you arrive as there are many variables that are involved in patient preparation. Your patience is greatly appreciated as you wait for your nurse. Please bring in items such as: books, magazines, newspapers, electronics, or any other items  to occupy your time in the waiting area. [x]  Delays may occur with surgery and staff will make a sincere effort to keep you informed of delays. If any delays occur with your procedure, we apologize ahead of time for your inconvenience as we recognize the value of your time.

## 2022-06-02 ENCOUNTER — HOSPITAL ENCOUNTER (OUTPATIENT)
Age: 77
Setting detail: OUTPATIENT SURGERY
Discharge: HOME | End: 2022-06-02
Attending: INTERNAL MEDICINE | Admitting: INTERNAL MEDICINE
Payer: MEDICARE

## 2022-06-02 ENCOUNTER — APPOINTMENT (OUTPATIENT)
Dept: GENERAL RADIOLOGY | Age: 77
End: 2022-06-02
Attending: INTERNAL MEDICINE
Payer: MEDICARE

## 2022-06-02 ENCOUNTER — ANESTHESIA EVENT (OUTPATIENT)
Dept: ENDOSCOPY | Age: 77
End: 2022-06-02
Payer: MEDICARE

## 2022-06-02 ENCOUNTER — ANESTHESIA (OUTPATIENT)
Dept: ENDOSCOPY | Age: 77
End: 2022-06-02
Payer: MEDICARE

## 2022-06-02 VITALS
SYSTOLIC BLOOD PRESSURE: 132 MMHG | WEIGHT: 163 LBS | TEMPERATURE: 98 F | BODY MASS INDEX: 26.2 KG/M2 | OXYGEN SATURATION: 95 % | RESPIRATION RATE: 13 BRPM | DIASTOLIC BLOOD PRESSURE: 72 MMHG | HEART RATE: 77 BPM | HEIGHT: 66 IN

## 2022-06-02 DIAGNOSIS — B44.9 ASPERGILLOMA (HCC): ICD-10-CM

## 2022-06-02 DIAGNOSIS — Z01.812 PRE-OPERATIVE LABORATORY EXAMINATION: Primary | ICD-10-CM

## 2022-06-02 LAB
ANION GAP SERPL CALCULATED.3IONS-SCNC: 13 MMOL/L (ref 7–16)
ANION GAP SERPL CALCULATED.3IONS-SCNC: 8 MMOL/L (ref 7–16)
BUN BLDV-MCNC: 12 MG/DL (ref 6–23)
BUN BLDV-MCNC: 13 MG/DL (ref 6–23)
CALCIUM SERPL-MCNC: 8.9 MG/DL (ref 8.6–10.2)
CALCIUM SERPL-MCNC: 9.5 MG/DL (ref 8.6–10.2)
CHLORIDE BLD-SCNC: 108 MMOL/L (ref 98–107)
CHLORIDE BLD-SCNC: 109 MMOL/L (ref 98–107)
CO2: 21 MMOL/L (ref 22–29)
CO2: 24 MMOL/L (ref 22–29)
CREAT SERPL-MCNC: 0.8 MG/DL (ref 0.5–1)
CREAT SERPL-MCNC: 0.9 MG/DL (ref 0.5–1)
GFR AFRICAN AMERICAN: >60
GFR AFRICAN AMERICAN: >60
GFR NON-AFRICAN AMERICAN: >60 ML/MIN/1.73
GFR NON-AFRICAN AMERICAN: >60 ML/MIN/1.73
GLUCOSE BLD-MCNC: 97 MG/DL (ref 74–99)
GLUCOSE BLD-MCNC: 99 MG/DL (ref 74–99)
HCT VFR BLD CALC: 36.8 % (ref 34–48)
HEMOGLOBIN: 11.1 G/DL (ref 11.5–15.5)
INR BLD: 1.4
MCH RBC QN AUTO: 26.4 PG (ref 26–35)
MCHC RBC AUTO-ENTMCNC: 30.2 % (ref 32–34.5)
MCV RBC AUTO: 87.6 FL (ref 80–99.9)
PDW BLD-RTO: 17 FL (ref 11.5–15)
PLATELET # BLD: 114 E9/L (ref 130–450)
PMV BLD AUTO: 13 FL (ref 7–12)
POTASSIUM SERPL-SCNC: 3.5 MMOL/L (ref 3.5–5)
POTASSIUM SERPL-SCNC: 3.8 MMOL/L (ref 3.5–5)
PROTHROMBIN TIME: 15.2 SEC (ref 9.3–12.4)
RBC # BLD: 4.2 E12/L (ref 3.5–5.5)
SODIUM BLD-SCNC: 140 MMOL/L (ref 132–146)
SODIUM BLD-SCNC: 143 MMOL/L (ref 132–146)
WBC # BLD: 5.3 E9/L (ref 4.5–11.5)

## 2022-06-02 PROCEDURE — 3700000001 HC ADD 15 MINUTES (ANESTHESIA): Performed by: INTERNAL MEDICINE

## 2022-06-02 PROCEDURE — 87205 SMEAR GRAM STAIN: CPT

## 2022-06-02 PROCEDURE — 87070 CULTURE OTHR SPECIMN AEROBIC: CPT

## 2022-06-02 PROCEDURE — 87015 SPECIMEN INFECT AGNT CONCNTJ: CPT

## 2022-06-02 PROCEDURE — 36415 COLL VENOUS BLD VENIPUNCTURE: CPT

## 2022-06-02 PROCEDURE — 3609010800 HC BRONCHOSCOPY ALVEOLAR LAVAGE: Performed by: INTERNAL MEDICINE

## 2022-06-02 PROCEDURE — 7100000010 HC PHASE II RECOVERY - FIRST 15 MIN: Performed by: INTERNAL MEDICINE

## 2022-06-02 PROCEDURE — 87102 FUNGUS ISOLATION CULTURE: CPT

## 2022-06-02 PROCEDURE — 7100000001 HC PACU RECOVERY - ADDTL 15 MIN: Performed by: INTERNAL MEDICINE

## 2022-06-02 PROCEDURE — 87186 SC STD MICRODIL/AGAR DIL: CPT

## 2022-06-02 PROCEDURE — 88312 SPECIAL STAINS GROUP 1: CPT

## 2022-06-02 PROCEDURE — 2580000003 HC RX 258: Performed by: NURSE ANESTHETIST, CERTIFIED REGISTERED

## 2022-06-02 PROCEDURE — 85610 PROTHROMBIN TIME: CPT

## 2022-06-02 PROCEDURE — 87077 CULTURE AEROBIC IDENTIFY: CPT

## 2022-06-02 PROCEDURE — 87116 MYCOBACTERIA CULTURE: CPT

## 2022-06-02 PROCEDURE — 2709999900 HC NON-CHARGEABLE SUPPLY: Performed by: INTERNAL MEDICINE

## 2022-06-02 PROCEDURE — 3609027000 HC BRONCHOSCOPY: Performed by: INTERNAL MEDICINE

## 2022-06-02 PROCEDURE — 80048 BASIC METABOLIC PNL TOTAL CA: CPT

## 2022-06-02 PROCEDURE — 3700000000 HC ANESTHESIA ATTENDED CARE: Performed by: INTERNAL MEDICINE

## 2022-06-02 PROCEDURE — 3609011300 HC BRONCHOSCOPY BRONCHIAL/ENDOBRNCL BX 1+ SITES: Performed by: INTERNAL MEDICINE

## 2022-06-02 PROCEDURE — 85027 COMPLETE CBC AUTOMATED: CPT

## 2022-06-02 PROCEDURE — 6360000002 HC RX W HCPCS: Performed by: NURSE ANESTHETIST, CERTIFIED REGISTERED

## 2022-06-02 PROCEDURE — 89051 BODY FLUID CELL COUNT: CPT

## 2022-06-02 PROCEDURE — 87305 ASPERGILLUS AG IA: CPT

## 2022-06-02 PROCEDURE — 88305 TISSUE EXAM BY PATHOLOGIST: CPT

## 2022-06-02 PROCEDURE — 87206 SMEAR FLUORESCENT/ACID STAI: CPT

## 2022-06-02 PROCEDURE — 2500000003 HC RX 250 WO HCPCS: Performed by: NURSE ANESTHETIST, CERTIFIED REGISTERED

## 2022-06-02 PROCEDURE — 7100000000 HC PACU RECOVERY - FIRST 15 MIN: Performed by: INTERNAL MEDICINE

## 2022-06-02 PROCEDURE — 3209999900 FLUORO FOR SURGICAL PROCEDURES

## 2022-06-02 PROCEDURE — 87281 PNEUMOCYSTIS CARINII AG IF: CPT

## 2022-06-02 PROCEDURE — 88112 CYTOPATH CELL ENHANCE TECH: CPT

## 2022-06-02 PROCEDURE — 7100000011 HC PHASE II RECOVERY - ADDTL 15 MIN: Performed by: INTERNAL MEDICINE

## 2022-06-02 PROCEDURE — 71045 X-RAY EXAM CHEST 1 VIEW: CPT

## 2022-06-02 RX ORDER — ONDANSETRON 2 MG/ML
4 INJECTION INTRAMUSCULAR; INTRAVENOUS
Status: DISCONTINUED | OUTPATIENT
Start: 2022-06-02 | End: 2022-06-02 | Stop reason: HOSPADM

## 2022-06-02 RX ORDER — PHENYLEPHRINE HCL IN 0.9% NACL 1 MG/10 ML
SYRINGE (ML) INTRAVENOUS PRN
Status: DISCONTINUED | OUTPATIENT
Start: 2022-06-02 | End: 2022-06-02 | Stop reason: SDUPTHER

## 2022-06-02 RX ORDER — ROCURONIUM BROMIDE 10 MG/ML
INJECTION, SOLUTION INTRAVENOUS PRN
Status: DISCONTINUED | OUTPATIENT
Start: 2022-06-02 | End: 2022-06-02 | Stop reason: SDUPTHER

## 2022-06-02 RX ORDER — LIDOCAINE HYDROCHLORIDE 20 MG/ML
INJECTION, SOLUTION INTRAVENOUS PRN
Status: DISCONTINUED | OUTPATIENT
Start: 2022-06-02 | End: 2022-06-02 | Stop reason: SDUPTHER

## 2022-06-02 RX ORDER — SODIUM CHLORIDE 9 MG/ML
INJECTION, SOLUTION INTRAVENOUS PRN
Status: DISCONTINUED | OUTPATIENT
Start: 2022-06-02 | End: 2022-06-02 | Stop reason: HOSPADM

## 2022-06-02 RX ORDER — SODIUM CHLORIDE 0.9 % (FLUSH) 0.9 %
5-40 SYRINGE (ML) INJECTION PRN
Status: DISCONTINUED | OUTPATIENT
Start: 2022-06-02 | End: 2022-06-02 | Stop reason: HOSPADM

## 2022-06-02 RX ORDER — DEXAMETHASONE SODIUM PHOSPHATE 10 MG/ML
INJECTION, SOLUTION INTRAMUSCULAR; INTRAVENOUS PRN
Status: DISCONTINUED | OUTPATIENT
Start: 2022-06-02 | End: 2022-06-02 | Stop reason: SDUPTHER

## 2022-06-02 RX ORDER — PROPOFOL 10 MG/ML
INJECTION, EMULSION INTRAVENOUS PRN
Status: DISCONTINUED | OUTPATIENT
Start: 2022-06-02 | End: 2022-06-02 | Stop reason: SDUPTHER

## 2022-06-02 RX ORDER — SODIUM CHLORIDE 0.9 % (FLUSH) 0.9 %
5-40 SYRINGE (ML) INJECTION EVERY 12 HOURS SCHEDULED
Status: DISCONTINUED | OUTPATIENT
Start: 2022-06-02 | End: 2022-06-02 | Stop reason: HOSPADM

## 2022-06-02 RX ORDER — ALFENTANIL HYDROCHLORIDE 500 UG/ML
INJECTION INTRAVENOUS PRN
Status: DISCONTINUED | OUTPATIENT
Start: 2022-06-02 | End: 2022-06-02 | Stop reason: SDUPTHER

## 2022-06-02 RX ORDER — SODIUM CHLORIDE 9 MG/ML
INJECTION, SOLUTION INTRAVENOUS CONTINUOUS PRN
Status: DISCONTINUED | OUTPATIENT
Start: 2022-06-02 | End: 2022-06-02 | Stop reason: SDUPTHER

## 2022-06-02 RX ORDER — ONDANSETRON 2 MG/ML
INJECTION INTRAMUSCULAR; INTRAVENOUS PRN
Status: DISCONTINUED | OUTPATIENT
Start: 2022-06-02 | End: 2022-06-02 | Stop reason: SDUPTHER

## 2022-06-02 RX ORDER — FENTANYL CITRATE 50 UG/ML
25 INJECTION, SOLUTION INTRAMUSCULAR; INTRAVENOUS EVERY 5 MIN PRN
Status: DISCONTINUED | OUTPATIENT
Start: 2022-06-02 | End: 2022-06-02 | Stop reason: HOSPADM

## 2022-06-02 RX ADMIN — PROPOFOL 100 MCG/KG/MIN: 10 INJECTION, EMULSION INTRAVENOUS at 14:53

## 2022-06-02 RX ADMIN — ROCURONIUM BROMIDE 30 MG: 10 INJECTION, SOLUTION INTRAVENOUS at 14:51

## 2022-06-02 RX ADMIN — SUGAMMADEX 300 MG: 100 INJECTION, SOLUTION INTRAVENOUS at 15:11

## 2022-06-02 RX ADMIN — PROPOFOL 150 MG: 10 INJECTION, EMULSION INTRAVENOUS at 14:51

## 2022-06-02 RX ADMIN — ALFENTANIL HYDROCHLORIDE 1000 MCG: 500 INJECTION INTRAVENOUS at 14:51

## 2022-06-02 RX ADMIN — LIDOCAINE HYDROCHLORIDE 80 MG: 20 INJECTION, SOLUTION INTRAVENOUS at 14:51

## 2022-06-02 RX ADMIN — DEXAMETHASONE SODIUM PHOSPHATE 10 MG: 10 INJECTION INTRAMUSCULAR; INTRAVENOUS at 14:51

## 2022-06-02 RX ADMIN — SODIUM CHLORIDE: 9 INJECTION, SOLUTION INTRAVENOUS at 14:47

## 2022-06-02 RX ADMIN — Medication 100 MCG: at 14:55

## 2022-06-02 RX ADMIN — ONDANSETRON HYDROCHLORIDE 4 MG: 2 SOLUTION INTRAMUSCULAR; INTRAVENOUS at 14:51

## 2022-06-02 ASSESSMENT — PAIN - FUNCTIONAL ASSESSMENT: PAIN_FUNCTIONAL_ASSESSMENT: NONE - DENIES PAIN

## 2022-06-02 NOTE — OP NOTE
[address] [place] [address]    Pulmonary/critical care procedure note    Flexible Fiberoptic Bronchoscopy NOTE    Patient: Mandy Washington    MRN: [id number]  : 1945    Date: 2022  Time: 3:23 PM     Primary Care Physician:      Yefri Neal MD     [phone]    Laboratory Work:  Lab Results   Component Value Date    INR 1.4 2022    INR 1.3 2016    PROTIME 15.2 (H) 2022    PROTIME 14.0 (H) 2016     Lab Results   Component Value Date    WBC 5.3 2022    HGB 11.1 (L) 2022    HCT 36.8 2022    MCV 87.6 2022     (L) 2022    LYMPHOPCT 3.5 (L) 2022    RBC 4.20 2022    MCH 26.4 2022    MCHC 30.2 (L) 2022    RDW 17.0 (H) 2022    NEUTOPHILPCT 91.2 (H) 2022    MONOPCT 4.7 2022    BASOPCT 0.0 2022    NEUTROABS 6.00 2022    LYMPHSABS 0.23 (L) 2022    MONOSABS 0.31 2022    EOSABS 0.00 (L) 2022    BASOSABS 0.00 2022     Lab Results   Component Value Date     2022    K 3.5 2022    K 4.2 2022     2022    CO2 24 2022    BUN 13 2022    CREATININE 0.9 2022    GLUCOSE 99 2022    CALCIUM 8.9 2022      Attending Physician: Dr. Maged Mathew    Assistant(s): Anesthesia Dept, Endoscopy, Fluoroscopy    Pre-Operative Medications: Per Anesthesia Dept     Intra-Operative Medications: Per Anesthesia Dept     Anesthesia: General Anesthesia    Pre-Procedure Diagnosis: Per Anesthesia Dept     Operation/Procedure:   1.) Flexible Fiberoptic Bronchoscopy  2.) BAL to LLL  3.) Transbronchial Biopsy to LLL x 6 Under Fluoroscopy (total of 48 seconds)    Post-Procedure Diagnosis: Bibasilar Cavities     Consent: Consent was obtained prior to procedure. Indications, risks, benefits were explained at length.     Indications: Pneumonia, Bibasilar Cavities    Purpose: Diagnostic, Therapeutic    Procedure Summary:   A time out was performed to confirm both patient and procedure. I had worn a gown with hat, mask, gloves prior to initiation of procedure. The patient was pre-medicated with medications per anesthesia dept. Intra-operatively, any additional medications were given under instruction of anesthesia dept. Bronchoscope was introduced via adapter at end of ET tube.   The trachea was inspected and found to be normal.  The main cinthya was sharp.     - The right bronchial tree was inspected and the following were noted:   [a] Right Upper Lobe contained three segments [apical segmental bronchus, posterior segmental bronchus, and anterior segmental bronchus] and was found to be normal.   [b] Bronchus Intermedius was examined and found to be normal.   [c] Right Middle Lobe had 2 segments [lateral segmental bronchus and medial segmental bronchus] and was found to be normal.   [d] Right Lower Lobe including apical segment and basal segmental branches [anterior basal branch, lateral basal branch, and posterior basal branch] was also inspected to the sub-segmental levels and was found to be normal.   [e] The right bronchial tree was found to be without stenosis, circumferential narrowing, intrinsic compressing mass, extrinsic compressing mass, obstruction in the area of the RUL, RML, RLL.     - The left bronchial tree was inspected and the following were noted:   [a] Left Upper Lobe including the apical posterior segmental bronchus and the anterior segmental bronchus were inspected and found to be normal.  [b] Lingula including the superior, inferior bronchopulmonary segments were inspected and found to be normal.   [c] Left Lower Lobe with superior segment was inspected to sub-segmental level [anterior basal segment, lateral basal segment, and posterior basal segment] and found to be normal.   - BAL to LLL  - Transbronchial Biopsy to LLL x 6 Under Fluoroscopy (total of 48 seconds)  [d] The left bronchial tree was found to be without stenosis, circumferential narrowing, intrinsic compressing mass, extrinsic compressing mass, obstruction in the area of the PAULINE, LINGULA, LLL. Procedures Completed:   1.) Flexible Fiberoptic Bronchoscopy  2.) BAL to LLL  3.) Transbronchial Biopsy to LLL x 6 Under Fluoroscopy (total of 48 seconds)    Chief differential diagnosis to include: fungal vs MAC vs other    Patient tolerated the post-procedure recovery. During the endoscopic procedure there were no/some tendencies towards: desaturations, hypotension, hypertension, bradycardia, tachycardia, dysrhythmia.     Subsequent chest x-ray was ordered post endoscopic procedure     Estimated Blood Loss: NONE    Complications: NONE    Tolerance: Excellent     Patient extubated and send to PACU in stable condition hemodynamically    Guillaume Rincon MD on 6/2/2022 at 3:23 PM

## 2022-06-02 NOTE — H&P
[place] [facility] and P NOTE    Patient: Too Middleton  MRN: [id number]  : 1945    Encounter Date: 2022  Encounter Time: 2:47 PM     Date of Admission: .2022  1:12 PM    Consulting Physician:  Primary Care Physician:      Srinivasan Acosta MD     [phone]    PROBLEM LIST:  Patient Active Problem List   Diagnosis    CAD (coronary artery disease)    Essential hypertension    Mixed hyperlipidemia    Acute hypoxemic respiratory failure due to COVID-19 [facility])     Reason for Consultation: Persistent Infiltrates     HPI:   Ms. Shivam Berumen is a 77 y/o female with past medical history noted for COVID 2022 that is here for persistent bibasilar abscess formation most recently 2022 imaging. PAST MEDICAL HISTORY:   Past Medical History:   Diagnosis Date    CAD (coronary artery disease)     Colon polyps     Osteoarthritis     Thyroid disease        PAST SURGICAL HISTORY:   Past Surgical History:   Procedure Laterality Date    ABDOMEN SURGERY      CARDIAC SURGERY      COLONOSCOPY      COLONOSCOPY N/A 2020    COLORECTAL CANCER SCREENING, NOT HIGH RISK performed by Jarad Martinez MD at [place] [address]  2016    Dr. Chantal Saini - 3.5x38 Rj Cohen ROSAMARIA to Prox RCA       FAMILY HISTORY:   Family History   Problem Relation Age of Onset    Cancer Mother         Pancreas    Heart Disease Father     Heart Disease Sister     Stroke Brother     Other Brother         Alcohol    Heart Disease Sister        SOCIAL HISTORY:   Social History     Socioeconomic History    Marital status:       Spouse name: Not on file    Number of children: Not on file    Years of education: Not on file    Highest education level: Not on file   Occupational History    Not on file   Tobacco Use    Smoking status: Never Smoker    Smokeless tobacco: Never Used   Vaping Use    Vaping Use: Never used Substance and Sexual Activity    Alcohol use: No    Drug use: No    Sexual activity: Not on file   Other Topics Concern    Not on file   Social History Narrative    Not on file     Social Determinants of Health     Financial Resource Strain:     Difficulty of Paying Living Expenses: Not on file   Food Insecurity:     Worried About Running Out of Food in the Last Year: Not on file    Humberto of Food in the Last Year: Not on file   Transportation Needs:     Lack of Transportation (Medical): Not on file    Lack of Transportation (Non-Medical): Not on file   Physical Activity:     Days of Exercise per Week: Not on file    Minutes of Exercise per Session: Not on file   Stress:     Feeling of Stress : Not on file   Social Connections:     Frequency of Communication with Friends and Family: Not on file    Frequency of Social Gatherings with Friends and Family: Not on file    Attends [employer] Services: Not on file    Active Member of [address] [place] [address] or Organizations: Not on file    Attends Club or Organization Meetings: Not on file    Marital Status: Not on file   Intimate Partner Violence:     Fear of Current or Ex-Partner: Not on file    Emotionally Abused: Not on file    Physically Abused: Not on file    Sexually Abused: Not on file   Housing Stability:     Unable to Pay for Housing in the Last Year: Not on file    Number of [place] in the Last Year: Not on file    Unstable Housing in the Last Year: Not on file     Social History     Tobacco Use   Smoking Status Never Smoker   Smokeless Tobacco Never Used     Social History     Substance and Sexual Activity   Alcohol Use No     Social History     Substance and Sexual Activity   Drug Use No     HOME MEDICATIONS:  Prior to Admission medications    Medication Sig Start Date End Date Taking?  Authorizing Provider   Multiple Vitamins-Minerals (PRESERVISION AREDS 2 PO) Take 2 tablets by mouth at bedtime   Yes Historical Provider, MD   loperamide (IMODIUM) 2 MG capsule Take 2 mg by mouth 4 times daily as needed for Diarrhea    Historical Provider, MD   Lorgena Rc Oil (MAXIMUM RED KRILL PO) Take 1 tablet by mouth at bedtime     Historical Provider, MD   Calcium Carbonate-Vitamin D (CALCIUM 600+D PO) Take 1 tablet by mouth at bedtime     Historical Provider, MD   Cetirizine HCl (ZYRTEC PO) Take by mouth every evening    Historical Provider, MD   aspirin 325 MG EC tablet Take 325 mg by mouth daily Nightly    Historical Provider, MD   leflunomide (ARAVA) 20 MG tablet Take 20 mg by mouth three times a week  6/15/18 9/26/28  Historical Provider, MD   predniSONE (DELTASONE) 5 MG tablet Take 5 mg by mouth four times a week Takes Tuesday, Thursday, Saturday, Sunday    Historical Provider, MD   folic acid (FOLVITE) 1 MG tablet Take 1 mg by mouth 6/15/18 9/26/28  Historical Provider, MD   losartan (COZAAR) 50 MG tablet Take 50 mg by mouth every evening  8/16/17   Historical Provider, MD   rosuvastatin (CRESTOR) 10 MG tablet Take 1 tablet by mouth nightly 6/4/16   Denice Pabon MD   levothyroxine (SYNTHROID) 25 MCG tablet Take 25 mcg by mouth three times a week Indications: every other day     Historical Provider, MD   ferrous sulfate 325 (65 FE) MG tablet Take 325 mg by mouth daily (with breakfast)     Historical Provider, MD   vitamin D (CHOLECALCIFEROL) 14187 UNIT CAPS Take 50,000 Units by mouth every 14 days Indications: every 2 weeks     Historical Provider, MD   metoprolol (TOPROL-XL) 50 MG XL tablet Take 50 mg by mouth daily    Historical Provider, MD   Coenzyme Q10 (CO Q-10) 400 MG CAPS Take 400 mg by mouth    Historical Provider, MD   Multiple Vitamins-Minerals (CENTRUM SILVER PO) Take 1 tablet by mouth    Historical Provider, MD       CURRENT MEDICATIONS:  No current facility-administered medications for this encounter.     IV MEDICATIONS:      ALLERGIES:  Allergies   Allergen Reactions    Plavix [Clopidogrel] Hives    Ace Inhibitors      cough       REVIEW OF Encounters:   22 (!) 149/69   22 (!) 142/63   21 125/69     Pulse Readings from Last 3 Encounters:   22 88   22 85   21 81       CURRENT PULSE OXIMETRY: SpO2: 98 %  24HR PULSE OXIMETRY RANGE: SpO2  Av %  Min: 98 %  Max: 98 %  CVP:      ________________________________________________________________________    VENTILATOR SETTINGS (if applicable): Additional Respiratory Assessments  Heart Rate: 88  Resp: 16  SpO2: 98 %  ETCO2:  Peak Inspiratory Pressure:  End-Inspiratory Plateau Pressure:    ABG:  No results for input(s): PH, PO2, PCO2, HCO3, BE, O2SAT, METHB, O2HB, COHB, O2CON, HHB, THB in the last 72 hours. ________________________________________________________________________    IV ACCESS:    NUTRITION: No diet orders on file    INTAKE/OUTPUTS:  No intake/output data recorded.   No intake or output data in the 24 hours ending 22 1447    General Appearance: alert and oriented to person, place and time, well-developed and   well-nourished, in no acute distress   Eyes: pupils equal, round, and reactive to light, extraocular eye movements intact, conjunctivae normal and sclera anicteric   Neck: neck supple and non tender without mass, no thyromegaly, no thyroid nodules and no cervical adenopathy   Pulmonary/Chest: decreased breath sounds, no accessory muscles of inspiration, no focal wheezes  Cardiovascular: normal rate, regular rhythm, normal S1 and S2, no murmurs, rubs, clicks or gallops, distal pulses intact, no carotid bruits, no murmurs, no gallops, no carotid bruits and no JVD   Abdomen: soft, non-tender, non-distended, normal bowel sounds, no masses or organomegaly   Extremities: no cyanosis, no clubbing, no edema  Musculoskeletal: normal range of motion, no joint swelling, deformity or tenderness   Neurologic: reflexes normal and symmetric, no cranial nerve deficit noted    LABS/IMAGING:    CBC:  Lab Results   Component Value Date    WBC 5.3 06/02/2022    HGB 11.1 (L) 06/02/2022    HCT 36.8 06/02/2022    MCV 87.6 06/02/2022     (L) 06/02/2022    LYMPHOPCT 3.5 (L) 01/31/2022    RBC 4.20 06/02/2022    MCH 26.4 06/02/2022    MCHC 30.2 (L) 06/02/2022    RDW 17.0 (H) 06/02/2022    NEUTOPHILPCT 91.2 (H) 01/31/2022    MONOPCT 4.7 01/31/2022    BASOPCT 0.0 01/31/2022    NEUTROABS 6.00 01/31/2022    LYMPHSABS 0.23 (L) 01/31/2022    MONOSABS 0.31 01/31/2022    EOSABS 0.00 (L) 01/31/2022    BASOSABS 0.00 01/31/2022       Recent Labs     06/02/22  1347   WBC 5.3   HGB 11.1*   HCT 36.8   MCV 87.6   *       BMP:   Recent Labs     06/02/22  1347      K 3.8   *   CO2 21*   BUN 12   CREATININE 0.8       MG: No results found for: MG  Ca/Phos:   Lab Results   Component Value Date    CALCIUM 9.5 06/02/2022     Amylase: No results found for: AMYLASE  Lipase: No results found for: LIPASE  LIVER PROFILE: No results for input(s): AST, ALT, LIPASE, BILIDIR, BILITOT, ALKPHOS in the last 72 hours. Invalid input(s): AMYLASE,  ALB    PT/INR: No results for input(s): PROTIME, INR in the last 72 hours. APTT: No results for input(s): APTT in the last 72 hours.     Cardiac Enzymes:  Lab Results   Component Value Date    CKTOTAL 66 06/03/2016    CKMB 5.8 (H) 06/03/2016    TROPONINI 0.03 06/03/2016       Hgb A1C: No results found for: LABA1C  No results found for: EAG  JESSE: No results found for: JESSE  ESR:   Lab Results   Component Value Date    SEDRATE 45 (H) 01/27/2022     CRP:   Lab Results   Component Value Date    CRP 11.5 (H) 01/27/2022     D Dimer:   Lab Results   Component Value Date    DDIMER 390 01/29/2022     Folate and B12:   Lab Results   Component Value Date    OBAKDSRT52 996 01/29/2022   ,   Lab Results   Component Value Date    FOLATE >20.0 01/29/2022       Lactic Acid:   Lab Results   Component Value Date    LACTA 1.9 01/27/2022     Ammonia:   Cortisol:  Thyroid Studies:  No results found for: TSH, B4XEMSD, C6PMDOH, THYROIDAB    Labs/Imaging: reviewed    CT Chest 4/16/2022:  Improving airspace disease in the lung bases with improvement seen in the   left pleural effusion.  The cavitary lesions are improving as well and   decreasing in size.  Continued follow-up is recommended to complete   resolution. ASSESSMENT:  1.) Persistent Bibasilar Infiltrates  2.) H/O COVID-19 Infection 1/2022    PLAN:  1.) bronchoscopy with BAL, lung washes, transbronchial biopsies   2.) await all final pathology     Thank you Jessica Lugo MD very much for allowing me to see this patient in consultation and follow up. Care reviewed with nursing staff, medical and surgical specialty care, primary care and the patient's family as available. Restraints are ordered when the patient can do harm to him/herself by pulling out devices.     Dustin Farfan MD, M.D.

## 2022-06-02 NOTE — ANESTHESIA PRE PROCEDURE
Department of Anesthesiology  Preprocedure Note       Name:  Mumtaz Rodríguez   Age:  78 y.o.  :  1945                                          MRN:  [id number]         Date:  2022      Surgeon: Damián Thacker):  Maria Teresa Moore MD    Procedure: Procedure(s):  BRONCHOSCOPY ALVEOLAR LAVAGE  BRONCHOSCOPY DIAGNOSTIC OR CELL [address] ONLY  BRONCHOSCOPY/TRANSBRONCHIAL NEEDLE BIOPSY    Medications prior to admission:   Prior to Admission medications    Medication Sig Start Date End Date Taking?  Authorizing Provider   Multiple Vitamins-Minerals (PRESERVISION AREDS 2 PO) Take 2 tablets by mouth at bedtime   Yes Historical Provider, MD   loperamide (IMODIUM) 2 MG capsule Take 2 mg by mouth 4 times daily as needed for Diarrhea    Historical Provider, MD [place] Oil (MAXIMUM RED KRILL PO) Take 1 tablet by mouth at bedtime     Historical Provider, MD   Calcium Carbonate-Vitamin D (CALCIUM 600+D PO) Take 1 tablet by mouth at bedtime     Historical Provider, MD   Cetirizine HCl (ZYRTEC PO) Take by mouth every evening    Historical Provider, MD   aspirin 325 MG EC tablet Take 325 mg by mouth daily Nightly    Historical Provider, MD   leflunomide (ARAVA) 20 MG tablet Take 20 mg by mouth three times a week  6/15/18 9/26/28  Historical Provider, MD   predniSONE (DELTASONE) 5 MG tablet Take 5 mg by mouth four times a week Takes Tuesday, Thursday, Saturday,     Historical Provider, MD   folic acid (FOLVITE) 1 MG tablet Take 1 mg by mouth 6/15/18 9/26/28  Historical Provider, MD   losartan (COZAAR) 50 MG tablet Take 50 mg by mouth every evening  17   Historical Provider, MD   rosuvastatin (CRESTOR) 10 MG tablet Take 1 tablet by mouth nightly 16   Janiya Mendez MD   levothyroxine (SYNTHROID) 25 MCG tablet Take 25 mcg by mouth three times a week Indications: every other day     Historical Provider, MD   ferrous sulfate 325 (65 FE) MG tablet Take 325 mg by mouth daily (with breakfast)     Historical Provider, MD   vitamin D (CHOLECALCIFEROL) 57195 UNIT CAPS Take 50,000 Units by mouth every 14 days Indications: every 2 weeks     Historical Provider, MD   metoprolol (TOPROL-XL) 50 MG XL tablet Take 50 mg by mouth daily    Historical Provider, MD   Coenzyme Q10 (CO Q-10) 400 MG CAPS Take 400 mg by mouth    Historical Provider, MD   Multiple Vitamins-Minerals (CENTRUM SILVER PO) Take 1 tablet by mouth    Historical Provider, MD       Current medications:    No current facility-administered medications for this encounter. Allergies: Allergies   Allergen Reactions    Plavix [Clopidogrel] Hives    Ace Inhibitors      cough       Problem List:    Patient Active Problem List   Diagnosis Code    CAD (coronary artery disease) I25.10    Essential hypertension I10    Mixed hyperlipidemia E78.2    Acute hypoxemic respiratory failure due to COVID-19 (HCC) U07.1, J96.01       Past Medical History:        Diagnosis Date    CAD (coronary artery disease)     Colon polyps     Osteoarthritis     Thyroid disease        Past Surgical History:        Procedure Laterality Date    ABDOMEN SURGERY      CARDIAC SURGERY      COLONOSCOPY      COLONOSCOPY N/A 7/23/2020    COLORECTAL CANCER SCREENING, NOT HIGH RISK performed by Cyril Sarmiento MD at [place] [address]  06/03/2016    [facility]       Social History:    Social History     Tobacco Use    Smoking status: Never Smoker    Smokeless tobacco: Never Used   Substance Use Topics    Alcohol use:  No                                Counseling given: Not Answered      Vital Signs (Current):   Vitals:    05/27/22 1102 06/02/22 1322   BP:  (!) 149/69   Pulse:  88   Resp:  16   Temp:  36.9 °C (98.4 °F)   TempSrc:  Temporal   SpO2:  98%   Weight: 163 lb (73.9 kg) 163 lb (73.9 kg)   Height: 5' 6\" (1.676 m) 5' 6\" (1.676 m)                                              BP Readings from Last 3 Encounters:   06/02/22 Neck ROM: full  Mouth opening: > = 3 FB   Dental: normal exam         Pulmonary:   (+) pneumonia: resolved,                             Cardiovascular:  Exercise tolerance: good (>4 METS),   (+) hypertension:, CAD:, CABG/stent:, hyperlipidemia      ECG reviewed  Rhythm: regular  Rate: normal           Beta Blocker:  Dose within 24 Hrs         Neuro/Psych:   Negative Neuro/Psych ROS              GI/Hepatic/Renal:            ROS comment: Chronic diarrhea. Endo/Other:    (+) : arthritis: OA., .                 Abdominal:             Vascular: negative vascular ROS. Other Findings:           Anesthesia Plan      MAC     ASA 3       Induction: intravenous. Anesthetic plan and risks discussed with patient. Plan discussed with attending.                     Mingo Easley, APRN - CRNA   6/2/2022

## 2022-06-03 LAB
APPEARANCE FLUID: CLEAR
BASO FLUID: 0 %
CELL COUNT FLUID TYPE: NORMAL
COLOR FLUID: COLORLESS
EOSINOPHIL FLUID: 2 %
GRAM STAIN ORDERABLE: NORMAL
LYMPHOCYTES, BODY FLUID: 16 %
MONOCYTE, FLUID: 13 %
NEUTROPHIL, FLUID: 69 %
NUCLEATED CELLS FLUID: 25 /UL
PNEUMOCYSTIS DFA: NORMAL
RBC FLUID: <2000 /UL

## 2022-06-05 LAB
CULTURE, RESPIRATORY: ABNORMAL
CULTURE, RESPIRATORY: ABNORMAL
ORGANISM: ABNORMAL
SMEAR, RESPIRATORY: ABNORMAL

## 2022-07-11 LAB
FUNGUS (MYCOLOGY) CULTURE: NORMAL
FUNGUS STAIN: NORMAL

## 2022-07-19 LAB
AFB CULTURE (MYCOBACTERIA): NORMAL
AFB SMEAR: NORMAL

## 2022-08-27 ENCOUNTER — HOSPITAL ENCOUNTER (OUTPATIENT)
Age: 77
Discharge: HOME | End: 2022-08-27
Payer: MEDICARE

## 2022-08-27 ENCOUNTER — HOSPITAL ENCOUNTER (EMERGENCY)
Age: 77
Discharge: HOME | End: 2022-08-27
Payer: MEDICARE

## 2022-08-27 VITALS
SYSTOLIC BLOOD PRESSURE: 123 MMHG | HEART RATE: 85 BPM | WEIGHT: 160 LBS | OXYGEN SATURATION: 100 % | DIASTOLIC BLOOD PRESSURE: 50 MMHG | RESPIRATION RATE: 14 BRPM | HEIGHT: 66 IN | TEMPERATURE: 98.4 F | BODY MASS INDEX: 25.71 KG/M2

## 2022-08-27 DIAGNOSIS — K08.89 PAIN, DENTAL: Primary | ICD-10-CM

## 2022-08-27 PROCEDURE — 36415 COLL VENOUS BLD VENIPUNCTURE: CPT

## 2022-08-27 PROCEDURE — 99283 EMERGENCY DEPT VISIT LOW MDM: CPT

## 2022-08-27 PROCEDURE — 82784 ASSAY IGA/IGD/IGG/IGM EACH: CPT

## 2022-08-27 PROCEDURE — 82787 IGG 1 2 3 OR 4 EACH: CPT

## 2022-08-27 RX ORDER — HYDROCODONE BITARTRATE AND ACETAMINOPHEN 5; 325 MG/1; MG/1
1 TABLET ORAL EVERY 6 HOURS PRN
Qty: 12 TABLET | Refills: 0 | Status: SHIPPED | OUTPATIENT
Start: 2022-08-27 | End: 2022-08-30

## 2022-08-27 RX ORDER — AMOXICILLIN AND CLAVULANATE POTASSIUM 875; 125 MG/1; MG/1
1 TABLET, FILM COATED ORAL 2 TIMES DAILY
Qty: 14 TABLET | Refills: 0 | Status: SHIPPED | OUTPATIENT
Start: 2022-08-27 | End: 2022-09-03

## 2022-08-27 RX ORDER — CHLORHEXIDINE GLUCONATE 0.12 MG/ML
15 RINSE ORAL 2 TIMES DAILY
Qty: 420 ML | Refills: 0 | Status: SHIPPED | OUTPATIENT
Start: 2022-08-27 | End: 2022-09-10

## 2022-08-27 NOTE — ED PROVIDER NOTES
[facility] Department of Emergency Medicine   ED  Provider Note  Admit Date/RoomTime: 8/27/2022 11:55 AM  ED Room: /    CHIEF COMPLAINT:   Chief Complaint   Patient presents with    Dental Pain     Upper rt toothache since 2200 last pm     ---------------------------------HISTORY OF PRESENT ILLNESS-----------------------------------     Seamus Ralph is a 78 y.o. female presenting to the ED for lower right dental pain that began around 11 PM last night. Patient states she does have a significant sensitivity to her back molar. She denies any injury or trauma. She denies any fever/chills, headache, dizziness, neck pain, chest pain, shortness of breath, pain with breathing, fever/chills, rash, ear pain, cough, congestion, or difficulty with ambulation or balance. She is alert and oriented x3 and in no apparent distress at this exam.  Patient is nontoxic-appearing. She states she has been taking Aleve with some relief. Review of Systems:   Pertinent positives and negatives are stated within HPI, all other systems reviewed and are negative.    --------------------------------------------- PAST HISTORY ---------------------------------------------    Past Medical History:  has a past medical history of CAD (coronary artery disease), Colon polyps, Osteoarthritis, and Thyroid disease. Past Surgical History:  has a past surgical history that includes Cardiac surgery; Abdomen surgery; Coronary angioplasty with stent (06/03/2016); Colonoscopy; Colonoscopy (N/A, 7/23/2020); bronchoscopy (N/A, 6/2/2022); bronchoscopy (N/A, 6/2/2022); and bronchoscopy (N/A, 6/2/2022). Social History:  reports that she has never smoked. She has never used smokeless tobacco. She reports that she does not drink alcohol and does not use drugs. Family History: family history includes Cancer in her mother; Heart Disease in her father, sister, and sister; Other in her brother; Stroke in her brother.      The patients home medications have been reviewed. Allergies: Plavix [clopidogrel] and Ace inhibitors  Allergies have been reviewed with patient.     -------------------------------------------------- RESULTS -------------------------------------------------  All laboratory and radiology results have been personally reviewed by myself   LABS:  No results found for this visit on 08/27/22. RADIOLOGY:  Interpreted by Radiologist.  No orders to display     ------------------------- NURSING NOTES AND VITALS REVIEWED ---------------------------  The nursing notes within the ED encounter and vital signs as below have been reviewed. BP (!) 123/50   Pulse 85   Temp 98.4 °F (36.9 °C) (Oral)   Resp 14   Ht 5' 6\" (1.676 m)   Wt 160 lb (72.6 kg)   SpO2 100%   BMI 25.82 kg/m²   Oxygen Saturation Interpretation: Normal    ---------------------------------------------------PHYSICAL EXAM--------------------------------------    Constitutional/General: Alert and oriented x3, well appearing, NAD  HEENT: NC/AT, EOMI, Airway patent  Mouth: The oropharynx is normal. No pharyngeal erythema, no trismus, uvula midline, floor of the mouth is soft. No tenderness in the submental or submandibular space. No tongue elevation. Pain with percussion to 30, no drainable abscess, mild gingival erythema  Neck: Supple. Non-tender with no lymphadenopathy   CVS: Regular rate and rhythm  Resp: Clear and equal bilaterally with good airflow, no distress  Musculo: Moves all extremities x 4. Warm and well perfused, No edema   Integument:  Normal turgor. Warm, dry, without visible rash, unless noted elsewhere. Neurological:  Motor functions intact. GCS 15, CN II-XII grossly intact     ------------------------------ ED COURSE/MEDICAL DECISION MAKING----------------------  ED Medications:  Medications - No data to display    Procedures:  None    Consultations:   None    Counseling:    The emergency provider has spoken with the patient and discussed todays results, in addition to providing specific details for the plan of care and counseling regarding the diagnosis and prognosis. Questions are answered at this time and they are agreeable with the plan. All results reviewed with pt and all questions answered. Patient understands that she must follow-up with dentist. Patient was advised to return to ED if symptoms worsen or new symptoms develop. Pt remained nontoxic, afebrile, and A&O x4 during this ED visit. They agreed with plan of care, discharge, and importance of follow-up. Pt was in no distress at discharge. Vitals stable. Patient was educated on newly prescribed medication.      --------------------------------- IMPRESSION AND DISPOSITION ---------------------------------    IMPRESSION  1. Pain, dental      DISPOSITION  Discharge to home  Patient condition is good    NEW MEDICATIONS   Current Discharge Medication List        START taking these medications    Details   HYDROcodone-acetaminophen (NORCO) 5-325 MG per tablet Take 1 tablet by mouth every 6 hours as needed for Pain for up to 3 days. Qty: 12 tablet, Refills: 0    Associated Diagnoses: Pain, dental      amoxicillin-clavulanate (AUGMENTIN) 875-125 MG per tablet Take 1 tablet by mouth 2 times daily for 7 days  Qty: 14 tablet, Refills: 0      chlorhexidine (PERIDEX) 0.12 % solution Take 15 mLs by mouth 2 times daily for 14 days  Qty: 420 mL, Refills: 0             NOTE: This report was transcribed using voice recognition software.  Every effort was made to ensure accuracy; however, inadvertent computerized transcription errors may be present    END OF PROVIDER NOTE         Molly Zapata PA-C  08/27/22 1121

## 2022-08-28 LAB
IGA: 123 MG/DL (ref 70–400)
IGG: 657 MG/DL (ref 700–1600)
IGM: 100 MG/DL (ref 40–230)

## 2022-08-30 LAB
IGG 1: 372 MG/DL (ref 240–1118)
IGG 2: 115 MG/DL (ref 124–549)
IGG 3: 63 MG/DL (ref 21–134)
IGG 4: 16 MG/DL (ref 1–123)

## 2022-09-10 ENCOUNTER — HOSPITAL ENCOUNTER (OUTPATIENT)
Dept: CT IMAGING | Age: 77
Discharge: HOME | End: 2022-09-12
Payer: MEDICARE

## 2022-09-10 DIAGNOSIS — B44.9 PULMONARY ASPERGILLOMA (HCC): ICD-10-CM

## 2022-09-10 PROCEDURE — 71250 CT THORAX DX C-: CPT

## 2022-09-29 ENCOUNTER — OFFICE VISIT (OUTPATIENT)
Dept: CARDIOLOGY CLINIC | Age: 77
End: 2022-09-29
Payer: MEDICARE

## 2022-09-29 VITALS
RESPIRATION RATE: 18 BRPM | WEIGHT: 164.4 LBS | DIASTOLIC BLOOD PRESSURE: 65 MMHG | HEIGHT: 66 IN | SYSTOLIC BLOOD PRESSURE: 124 MMHG | BODY MASS INDEX: 26.42 KG/M2 | HEART RATE: 74 BPM

## 2022-09-29 DIAGNOSIS — I25.10 CORONARY ARTERY DISEASE INVOLVING NATIVE CORONARY ARTERY OF NATIVE HEART WITHOUT ANGINA PECTORIS: Primary | ICD-10-CM

## 2022-09-29 PROBLEM — J96.01 ACUTE HYPOXEMIC RESPIRATORY FAILURE DUE TO COVID-19 (HCC): Status: RESOLVED | Noted: 2022-01-27 | Resolved: 2022-09-29

## 2022-09-29 PROBLEM — U07.1 ACUTE HYPOXEMIC RESPIRATORY FAILURE DUE TO COVID-19 (HCC): Status: RESOLVED | Noted: 2022-01-27 | Resolved: 2022-09-29

## 2022-09-29 PROCEDURE — G8400 PT W/DXA NO RESULTS DOC: HCPCS | Performed by: INTERNAL MEDICINE

## 2022-09-29 PROCEDURE — 1090F PRES/ABSN URINE INCON ASSESS: CPT | Performed by: INTERNAL MEDICINE

## 2022-09-29 PROCEDURE — 99213 OFFICE O/P EST LOW 20 MIN: CPT | Performed by: INTERNAL MEDICINE

## 2022-09-29 PROCEDURE — 1036F TOBACCO NON-USER: CPT | Performed by: INTERNAL MEDICINE

## 2022-09-29 PROCEDURE — G8427 DOCREV CUR MEDS BY ELIG CLIN: HCPCS | Performed by: INTERNAL MEDICINE

## 2022-09-29 PROCEDURE — 3017F COLORECTAL CA SCREEN DOC REV: CPT | Performed by: INTERNAL MEDICINE

## 2022-09-29 PROCEDURE — 93000 ELECTROCARDIOGRAM COMPLETE: CPT | Performed by: INTERNAL MEDICINE

## 2022-09-29 PROCEDURE — 1123F ACP DISCUSS/DSCN MKR DOCD: CPT | Performed by: INTERNAL MEDICINE

## 2022-09-29 PROCEDURE — G8417 CALC BMI ABV UP PARAM F/U: HCPCS | Performed by: INTERNAL MEDICINE

## 2022-09-29 NOTE — PROGRESS NOTES
Chief Complaint   Patient presents with    Coronary Artery Disease       Patient Active Problem List    Diagnosis Date Noted    CAD (coronary artery disease) 08/28/2017     Overview Note:     A. Hx of remote LAD stenting - details unknown  B. NSTEMI  6/3/2016:   [place] ROSAMARIA 3.5 X 38 to RCA. Mild ISR in LAD stent. EF 45%      Essential hypertension 08/28/2017    Mixed hyperlipidemia 08/28/2017       Current Outpatient Medications   Medication Sig Dispense Refill    Multiple Vitamins-Minerals (PRESERVISION AREDS 2 PO) Take 2 tablets by mouth at bedtime      loperamide (IMODIUM) 2 MG capsule Take 2 mg by mouth 4 times daily as needed for Diarrhea      Krill Oil (MAXIMUM RED KRILL PO) Take 1 tablet by mouth at bedtime       Calcium Carbonate-Vitamin D (CALCIUM 600+D PO) Take 1 tablet by mouth at bedtime       Cetirizine HCl (ZYRTEC PO) Take by mouth every evening      aspirin 325 MG EC tablet Take 325 mg by mouth daily Nightly      leflunomide (ARAVA) 20 MG tablet Take 20 mg by mouth three times a week       predniSONE (DELTASONE) 5 MG tablet Take 5 mg by mouth as needed Takes Tuesday, Thursday, Saturday, Sunday      folic acid (FOLVITE) 1 MG tablet Take 1 mg by mouth      losartan (COZAAR) 50 MG tablet Take 50 mg by mouth every evening       rosuvastatin (CRESTOR) 10 MG tablet Take 1 tablet by mouth nightly 30 tablet 0    levothyroxine (SYNTHROID) 25 MCG tablet Take 25 mcg by mouth every other day Indications: every other day      ferrous sulfate 325 (65 FE) MG tablet Take 325 mg by mouth daily (with breakfast)       vitamin D (CHOLECALCIFEROL) 23993 UNIT CAPS Take 50,000 Units by mouth every 14 days Indications: every 2 weeks       metoprolol (TOPROL-XL) 50 MG XL tablet Take 50 mg by mouth daily      Coenzyme Q10 (CO Q-10) 400 MG CAPS Take 400 mg by mouth      Multiple Vitamins-Minerals (CENTRUM SILVER PO) Take 1 tablet by mouth       No current facility-administered medications for this visit.         Allergies Allergen Reactions    Plavix [Clopidogrel] Hives    Ace Inhibitors      cough       Vitals:    09/29/22 0834   BP: 124/65   Pulse: 74   Resp: 18   Weight: 164 lb 6.4 oz (74.6 kg)   Height: 5' 6\" (1.676 m)       Social History     Socioeconomic History    Marital status:      Spouse name: Not on file    Number of children: Not on file    Years of education: Not on file    Highest education level: Not on file   Occupational History    Not on file   Tobacco Use    Smoking status: Never    Smokeless tobacco: Never   Vaping Use    Vaping Use: Never used   Substance and Sexual Activity    Alcohol use: No    Drug use: No    Sexual activity: Not on file   Other Topics Concern    Not on file   Social History Narrative    Not on file     Social Determinants of Health     Financial Resource Strain: Not on file   Food Insecurity: Not on file   Transportation Needs: Not on file   Physical Activity: Not on file   Stress: Not on file   Social Connections: Not on file   Intimate Partner Violence: Not on file   Housing Stability: Not on file       Family History   Problem Relation Age of Onset    Cancer Mother         Pancreas    Heart Disease Father     Heart Disease Sister     Stroke Brother     Other Brother         Alcohol    Heart Disease Sister          SUBJECTIVE: Latanya Webb presents to the office today for re-evaluation of cardiac diagnoses and hfu.  I reviewed all notes - had aspergillus lung? ? With no cardiac complications  . She complains of  no symptoms  and denies   chest pain, chest pressure/discomfort, claudication, dyspnea, exertional chest pressure/discomfort, fatigue, irregular heart beat, lower extremity edema, near-syncope, orthopnea, palpitations, paroxysmal nocturnal dyspnea, syncope and tachypnea. [place] rheum opinion placed her on arava for her RA . Compliant with meds. No bleeding. Lives with sister and brother in law - does all the cooking and cares for her sister who is immobile. Non smoker. Physical Exam   /65   Pulse 74   Resp 18   Ht 5' 6\" (1.676 m)   Wt 164 lb 6.4 oz (74.6 kg)   BMI 26.53 kg/m²   Constitutional: Oriented to person, place, and time. No distress. Head: Normocephalic and atraumatic. Neck: Normal range of motion. Neck supple. No hepatojugular reflux and no JVD present. Carotid bruit is not present. Cardiovascular: Normal rate, regular rhythm, normal heart sounds and intact distal pulses. Exam reveals no gallop and no friction rub. No murmur heard. Pulmonary/Chest: Effort normal and breath sounds normal. No respiratory distress. No wheezes. No rales. Abdominal: Soft. Bowel sounds are normal. No distension and no mass. No tenderness. Musculoskeletal: Normal range of motion. No edema   Neurological: Alert and oriented to person, place, and time. Skin: Skin is warm and dry. No rash noted. Not diaphoretic. No erythema. Psychiatric: Normal mood and affect. Behavior is normal.     EKG:  normal EKG, normal sinus rhythm    ASSESSMENT AND PLAN:  Patient Active Problem List   Diagnosis    CAD (coronary artery disease): remote LAD stenting and PCI of RCA 2016. No recurrent symptoms. Normal ekg    Essential hypertension: at target    Mixed hyperlipidemia: target LDL < 70: on statin        The patient was educated as to the symptoms that would require a prompt return to our office. Return visit in 1 year.     Carmen Torres M.D  [facility]

## 2023-03-17 ENCOUNTER — HOSPITAL ENCOUNTER (OUTPATIENT)
Age: 78
Discharge: HOME | End: 2023-03-17
Payer: MEDICARE

## 2023-03-17 DIAGNOSIS — J98.4 CAVITARY LESION OF LUNG: ICD-10-CM

## 2023-03-17 PROCEDURE — 36415 COLL VENOUS BLD VENIPUNCTURE: CPT

## 2023-03-17 PROCEDURE — 86359 T CELLS TOTAL COUNT: CPT

## 2023-03-17 PROCEDURE — 87449 NOS EACH ORGANISM AG IA: CPT

## 2023-03-17 PROCEDURE — 82787 IGG 1 2 3 OR 4 EACH: CPT

## 2023-03-17 PROCEDURE — 82784 ASSAY IGA/IGD/IGG/IGM EACH: CPT

## 2023-03-17 PROCEDURE — 86360 T CELL ABSOLUTE COUNT/RATIO: CPT

## 2023-03-18 LAB
IGA SERPL-MCNC: 130 MG/DL (ref 70–400)
IGG SERPL-MCNC: 680 MG/DL (ref 700–1600)
IGM SERPL-MCNC: 105 MG/DL (ref 40–230)

## 2023-03-19 LAB
CD3 CELLS # BLD: 630 CELLS/UL (ref 660–2200)
CD3+CD4+ CELLS # BLD: 560 CELLS/UL (ref 490–1600)
CD3+CD4+ CELLS/CD3+CD8+ CLL BLD: 8.12 RATIO (ref 0.8–6.17)
CD3+CD8+ CELLS # BLD: 69 CELLS/UL (ref 150–1050)
LYMPH SUBSET INFORMATION: ABNORMAL

## 2023-03-20 LAB
(1,3)-BETA-D-GLUCAN (FUNGITELL) INTERPRETATION: NEGATIVE
1,3 BETA GLUCAN SER-MCNC: <31 PG/ML
IGG1 SER-MCNC: 445 MG/DL (ref 240–1118)
IGG2 SER-MCNC: 105 MG/DL (ref 124–549)
IGG3 SER-MCNC: 65 MG/DL (ref 21–134)
IGG4 SER-MCNC: 14 MG/DL (ref 1–123)

## 2023-04-17 ENCOUNTER — HOSPITAL ENCOUNTER (OUTPATIENT)
Dept: CT IMAGING | Age: 78
Discharge: HOME | End: 2023-04-19
Payer: MEDICARE

## 2023-04-17 DIAGNOSIS — J16.8 FUNGAL PNEUMONIA: ICD-10-CM

## 2023-04-17 DIAGNOSIS — B49 FUNGAL PNEUMONIA: ICD-10-CM

## 2023-04-17 PROCEDURE — 71250 CT THORAX DX C-: CPT

## 2023-07-26 ENCOUNTER — HOSPITAL ENCOUNTER (INPATIENT)
Age: 78
LOS: 7 days | Discharge: HOME | DRG: 187 | End: 2023-08-03
Attending: EMERGENCY MEDICINE | Admitting: INTERNAL MEDICINE
Payer: MEDICARE

## 2023-07-26 ENCOUNTER — APPOINTMENT (OUTPATIENT)
Dept: GENERAL RADIOLOGY | Age: 78
DRG: 187 | End: 2023-07-26
Payer: MEDICARE

## 2023-07-26 DIAGNOSIS — N17.9 AKI (ACUTE KIDNEY INJURY) (HCC): ICD-10-CM

## 2023-07-26 DIAGNOSIS — J90 LOCULATED PLEURAL EFFUSION: Primary | ICD-10-CM

## 2023-07-26 DIAGNOSIS — T17.500A MUCUS PLUGGING OF BRONCHI: ICD-10-CM

## 2023-07-26 LAB
ALBUMIN SERPL-MCNC: 3.3 G/DL (ref 3.5–5.2)
ALP SERPL-CCNC: 229 U/L (ref 35–104)
ALT SERPL-CCNC: 39 U/L (ref 0–32)
ANION GAP SERPL CALCULATED.3IONS-SCNC: 15 MMOL/L (ref 7–16)
AST SERPL-CCNC: 38 U/L (ref 0–31)
BASOPHILS # BLD: 0.04 K/UL (ref 0–0.2)
BASOPHILS NFR BLD: 1 % (ref 0–2)
BILIRUB SERPL-MCNC: 0.9 MG/DL (ref 0–1.2)
BUN SERPL-MCNC: 45 MG/DL (ref 6–23)
CALCIUM SERPL-MCNC: 9.1 MG/DL (ref 8.6–10.2)
CHLORIDE SERPL-SCNC: 106 MMOL/L (ref 98–107)
CO2 SERPL-SCNC: 15 MMOL/L (ref 22–29)
CREAT SERPL-MCNC: 1.8 MG/DL (ref 0.5–1)
EOSINOPHIL # BLD: 0.41 K/UL (ref 0.05–0.5)
EOSINOPHILS RELATIVE PERCENT: 5 % (ref 0–6)
ERYTHROCYTE [DISTWIDTH] IN BLOOD BY AUTOMATED COUNT: 15.9 % (ref 11.5–15)
FLUAV RNA RESP QL NAA+PROBE: NOT DETECTED
FLUBV RNA RESP QL NAA+PROBE: NOT DETECTED
GFR SERPL CREATININE-BSD FRML MDRD: 29 ML/MIN/1.73M2
GLUCOSE SERPL-MCNC: 114 MG/DL (ref 74–99)
HCT VFR BLD AUTO: 32.5 % (ref 34–48)
HGB BLD-MCNC: 10.4 G/DL (ref 11.5–15.5)
IMM GRANULOCYTES # BLD AUTO: 0.04 K/UL (ref 0–0.58)
IMM GRANULOCYTES NFR BLD: 1 % (ref 0–5)
INR PPP: 1.7
LIPASE SERPL-CCNC: 58 U/L (ref 13–60)
LYMPHOCYTES NFR BLD: 0.67 K/UL (ref 1.5–4)
LYMPHOCYTES RELATIVE PERCENT: 9 % (ref 20–42)
MCH RBC QN AUTO: 27.4 PG (ref 26–35)
MCHC RBC AUTO-ENTMCNC: 32 G/DL (ref 32–34.5)
MCV RBC AUTO: 85.5 FL (ref 80–99.9)
MONOCYTES NFR BLD: 0.94 K/UL (ref 0.1–0.95)
MONOCYTES NFR BLD: 12 % (ref 2–12)
NEUTROPHILS NFR BLD: 73 % (ref 43–80)
NEUTS SEG NFR BLD: 5.64 K/UL (ref 1.8–7.3)
PLATELET # BLD AUTO: 127 K/UL (ref 130–450)
PLATELET CONFIRMATION: NORMAL
PMV BLD AUTO: 12.8 FL (ref 7–12)
POTASSIUM SERPL-SCNC: 4.2 MMOL/L (ref 3.5–5)
PROT SERPL-MCNC: 7 G/DL (ref 6.4–8.3)
PROTHROMBIN TIME: 18.9 SEC (ref 9.3–12.4)
RBC # BLD AUTO: 3.8 M/UL (ref 3.5–5.5)
SARS-COV-2 RNA RESP QL NAA+PROBE: NOT DETECTED
SODIUM SERPL-SCNC: 136 MMOL/L (ref 132–146)
SOURCE: NORMAL
SPECIMEN DESCRIPTION: NORMAL
TROPONIN I SERPL HS-MCNC: 13 NG/L (ref 0–9)
TROPONIN I SERPL HS-MCNC: 13 NG/L (ref 0–9)
WBC OTHER # BLD: 7.7 K/UL (ref 4.5–11.5)

## 2023-07-26 PROCEDURE — 2580000003 HC RX 258: Performed by: STUDENT IN AN ORGANIZED HEALTH CARE EDUCATION/TRAINING PROGRAM

## 2023-07-26 PROCEDURE — 74176 CT ABD & PELVIS W/O CONTRAST: CPT

## 2023-07-26 PROCEDURE — 71250 CT THORAX DX C-: CPT

## 2023-07-26 PROCEDURE — 6360000002 HC RX W HCPCS: Performed by: EMERGENCY MEDICINE

## 2023-07-26 PROCEDURE — 84484 ASSAY OF TROPONIN QUANT: CPT

## 2023-07-26 PROCEDURE — 99285 EMERGENCY DEPT VISIT HI MDM: CPT

## 2023-07-26 PROCEDURE — 83690 ASSAY OF LIPASE: CPT

## 2023-07-26 PROCEDURE — 85027 COMPLETE CBC AUTOMATED: CPT

## 2023-07-26 PROCEDURE — 80053 COMPREHEN METABOLIC PANEL: CPT

## 2023-07-26 PROCEDURE — 85610 PROTHROMBIN TIME: CPT

## 2023-07-26 PROCEDURE — 96374 THER/PROPH/DIAG INJ IV PUSH: CPT

## 2023-07-26 PROCEDURE — 93005 ELECTROCARDIOGRAM TRACING: CPT | Performed by: EMERGENCY MEDICINE

## 2023-07-26 PROCEDURE — 87636 SARSCOV2 & INF A&B AMP PRB: CPT

## 2023-07-26 RX ORDER — KETOROLAC TROMETHAMINE 30 MG/ML
15 INJECTION, SOLUTION INTRAMUSCULAR; INTRAVENOUS ONCE
Status: COMPLETED | OUTPATIENT
Start: 2023-07-26 | End: 2023-07-26

## 2023-07-26 RX ORDER — 0.9 % SODIUM CHLORIDE 0.9 %
1000 INTRAVENOUS SOLUTION INTRAVENOUS ONCE
Status: COMPLETED | OUTPATIENT
Start: 2023-07-26 | End: 2023-07-26

## 2023-07-26 RX ADMIN — KETOROLAC TROMETHAMINE 15 MG: 30 INJECTION, SOLUTION INTRAMUSCULAR; INTRAVENOUS at 17:32

## 2023-07-26 RX ADMIN — SODIUM CHLORIDE 1000 ML: 9 INJECTION, SOLUTION INTRAVENOUS at 20:51

## 2023-07-26 ASSESSMENT — PAIN DESCRIPTION - ORIENTATION: ORIENTATION: RIGHT

## 2023-07-26 ASSESSMENT — PAIN DESCRIPTION - DESCRIPTORS: DESCRIPTORS: SQUEEZING

## 2023-07-26 ASSESSMENT — PAIN - FUNCTIONAL ASSESSMENT
PAIN_FUNCTIONAL_ASSESSMENT: 0-10
PAIN_FUNCTIONAL_ASSESSMENT: NONE - DENIES PAIN

## 2023-07-26 ASSESSMENT — PAIN SCALES - GENERAL: PAINLEVEL_OUTOF10: 7

## 2023-07-26 ASSESSMENT — PAIN DESCRIPTION - LOCATION: LOCATION: RIB CAGE

## 2023-07-26 ASSESSMENT — PAIN DESCRIPTION - PAIN TYPE: TYPE: ACUTE PAIN

## 2023-07-26 ASSESSMENT — PAIN DESCRIPTION - ONSET: ONSET: SUDDEN

## 2023-07-26 ASSESSMENT — PAIN DESCRIPTION - FREQUENCY: FREQUENCY: CONTINUOUS

## 2023-07-27 PROBLEM — J90 LOCULATED PLEURAL EFFUSION: Status: ACTIVE | Noted: 2023-07-27

## 2023-07-27 LAB
ANION GAP SERPL CALCULATED.3IONS-SCNC: 15 MMOL/L (ref 7–16)
BACTERIA URNS QL MICRO: ABNORMAL
BASOPHILS # BLD: 0.02 K/UL (ref 0–0.2)
BASOPHILS NFR BLD: 0 % (ref 0–2)
BILIRUB UR QL STRIP: NEGATIVE
BUN SERPL-MCNC: 40 MG/DL (ref 6–23)
CALCIUM SERPL-MCNC: 9 MG/DL (ref 8.6–10.2)
CHLORIDE SERPL-SCNC: 112 MMOL/L (ref 98–107)
CLARITY UR: CLEAR
CO2 SERPL-SCNC: 15 MMOL/L (ref 22–29)
COLOR UR: YELLOW
CREAT SERPL-MCNC: 1.6 MG/DL (ref 0.5–1)
CRP SERPL HS-MCNC: 144 MG/L (ref 0–5)
EKG ATRIAL RATE: 93 BPM
EKG P AXIS: 42 DEGREES
EKG P-R INTERVAL: 136 MS
EKG Q-T INTERVAL: 330 MS
EKG QRS DURATION: 92 MS
EKG QTC CALCULATION (BAZETT): 410 MS
EKG R AXIS: 14 DEGREES
EKG T AXIS: 22 DEGREES
EKG VENTRICULAR RATE: 93 BPM
EOSINOPHIL # BLD: 0.51 K/UL (ref 0.05–0.5)
EOSINOPHILS RELATIVE PERCENT: 10 % (ref 0–6)
ERYTHROCYTE [DISTWIDTH] IN BLOOD BY AUTOMATED COUNT: 15.8 % (ref 11.5–15)
ERYTHROCYTE [SEDIMENTATION RATE] IN BLOOD BY WESTERGREN METHOD: 106 MM/HR (ref 0–20)
GFR SERPL CREATININE-BSD FRML MDRD: 34 ML/MIN/1.73M2
GLUCOSE SERPL-MCNC: 97 MG/DL (ref 74–99)
GLUCOSE UR STRIP-MCNC: NEGATIVE MG/DL
HCT VFR BLD AUTO: 30 % (ref 34–48)
HGB BLD-MCNC: 9 G/DL (ref 11.5–15.5)
HGB UR QL STRIP.AUTO: ABNORMAL
IMM GRANULOCYTES # BLD AUTO: <0.03 K/UL (ref 0–0.58)
IMM GRANULOCYTES NFR BLD: 0 % (ref 0–5)
KETONES UR STRIP-MCNC: ABNORMAL MG/DL
LEUKOCYTE ESTERASE UR QL STRIP: ABNORMAL
LYMPHOCYTES NFR BLD: 0.65 K/UL (ref 1.5–4)
LYMPHOCYTES RELATIVE PERCENT: 12 % (ref 20–42)
MCH RBC QN AUTO: 26.9 PG (ref 26–35)
MCHC RBC AUTO-ENTMCNC: 30 G/DL (ref 32–34.5)
MCV RBC AUTO: 89.8 FL (ref 80–99.9)
MONOCYTES NFR BLD: 0.53 K/UL (ref 0.1–0.95)
MONOCYTES NFR BLD: 10 % (ref 2–12)
NEUTROPHILS NFR BLD: 67 % (ref 43–80)
NEUTS SEG NFR BLD: 3.53 K/UL (ref 1.8–7.3)
NITRITE UR QL STRIP: NEGATIVE
PH UR STRIP: 7 [PH] (ref 5–9)
PLATELET CONFIRMATION: NORMAL
PLATELET, FLUORESCENCE: 86 K/UL (ref 130–450)
PMV BLD AUTO: 12.9 FL (ref 7–12)
POTASSIUM SERPL-SCNC: 4.1 MMOL/L (ref 3.5–5)
PROCALCITONIN SERPL-MCNC: 0.15 NG/ML (ref 0–0.08)
PROT UR STRIP-MCNC: 30 MG/DL
RBC # BLD AUTO: 3.34 M/UL (ref 3.5–5.5)
RBC #/AREA URNS HPF: ABNORMAL /HPF
SODIUM SERPL-SCNC: 142 MMOL/L (ref 132–146)
SP GR UR STRIP: 1.01 (ref 1–1.03)
UROBILINOGEN UR STRIP-ACNC: 0.2 EU/DL (ref 0–1)
WBC #/AREA URNS HPF: ABNORMAL /HPF
WBC OTHER # BLD: 5.3 K/UL (ref 4.5–11.5)

## 2023-07-27 PROCEDURE — 6360000002 HC RX W HCPCS: Performed by: INTERNAL MEDICINE

## 2023-07-27 PROCEDURE — 85027 COMPLETE CBC AUTOMATED: CPT

## 2023-07-27 PROCEDURE — 81001 URINALYSIS AUTO W/SCOPE: CPT

## 2023-07-27 PROCEDURE — 87449 NOS EACH ORGANISM AG IA: CPT

## 2023-07-27 PROCEDURE — 80048 BASIC METABOLIC PNL TOTAL CA: CPT

## 2023-07-27 PROCEDURE — 84145 PROCALCITONIN (PCT): CPT

## 2023-07-27 PROCEDURE — 87081 CULTURE SCREEN ONLY: CPT

## 2023-07-27 PROCEDURE — 36415 COLL VENOUS BLD VENIPUNCTURE: CPT

## 2023-07-27 PROCEDURE — 87040 BLOOD CULTURE FOR BACTERIA: CPT

## 2023-07-27 PROCEDURE — 86140 C-REACTIVE PROTEIN: CPT

## 2023-07-27 PROCEDURE — 93010 ELECTROCARDIOGRAM REPORT: CPT | Performed by: INTERNAL MEDICINE

## 2023-07-27 PROCEDURE — 6370000000 HC RX 637 (ALT 250 FOR IP): Performed by: INTERNAL MEDICINE

## 2023-07-27 PROCEDURE — 85652 RBC SED RATE AUTOMATED: CPT

## 2023-07-27 PROCEDURE — 2140000000 HC CCU INTERMEDIATE R&B

## 2023-07-27 PROCEDURE — 2580000003 HC RX 258: Performed by: INTERNAL MEDICINE

## 2023-07-27 RX ORDER — FERROUS SULFATE 325(65) MG
325 TABLET ORAL
Status: DISCONTINUED | OUTPATIENT
Start: 2023-07-27 | End: 2023-08-03 | Stop reason: HOSPADM

## 2023-07-27 RX ORDER — LEVOTHYROXINE SODIUM 0.03 MG/1
25 TABLET ORAL EVERY OTHER DAY
Status: DISCONTINUED | OUTPATIENT
Start: 2023-07-27 | End: 2023-08-03 | Stop reason: HOSPADM

## 2023-07-27 RX ORDER — LOSARTAN POTASSIUM 50 MG/1
50 TABLET ORAL EVERY EVENING
Status: DISCONTINUED | OUTPATIENT
Start: 2023-07-27 | End: 2023-08-03 | Stop reason: HOSPADM

## 2023-07-27 RX ORDER — METOPROLOL SUCCINATE 50 MG/1
50 TABLET, EXTENDED RELEASE ORAL DAILY
Status: DISCONTINUED | OUTPATIENT
Start: 2023-07-27 | End: 2023-08-03 | Stop reason: HOSPADM

## 2023-07-27 RX ORDER — LEFLUNOMIDE 10 MG/1
20 TABLET ORAL
Status: DISCONTINUED | OUTPATIENT
Start: 2023-07-28 | End: 2023-08-03 | Stop reason: HOSPADM

## 2023-07-27 RX ORDER — ROSUVASTATIN CALCIUM 20 MG/1
10 TABLET, COATED ORAL NIGHTLY
Status: DISCONTINUED | OUTPATIENT
Start: 2023-07-27 | End: 2023-08-03 | Stop reason: HOSPADM

## 2023-07-27 RX ORDER — ERGOCALCIFEROL 1.25 MG/1
50000 CAPSULE ORAL
Status: DISCONTINUED | OUTPATIENT
Start: 2023-08-06 | End: 2023-08-03 | Stop reason: HOSPADM

## 2023-07-27 RX ORDER — LOPERAMIDE HYDROCHLORIDE 2 MG/1
2 CAPSULE ORAL 4 TIMES DAILY PRN
Status: DISCONTINUED | OUTPATIENT
Start: 2023-07-27 | End: 2023-08-03 | Stop reason: HOSPADM

## 2023-07-27 RX ORDER — FOLIC ACID 1 MG/1
1 TABLET ORAL DAILY
Status: DISCONTINUED | OUTPATIENT
Start: 2023-07-27 | End: 2023-08-03 | Stop reason: HOSPADM

## 2023-07-27 RX ORDER — SODIUM CHLORIDE 9 MG/ML
INJECTION, SOLUTION INTRAVENOUS CONTINUOUS
Status: ACTIVE | OUTPATIENT
Start: 2023-07-27 | End: 2023-07-29

## 2023-07-27 RX ADMIN — FERROUS SULFATE TAB 325 MG (65 MG ELEMENTAL FE) 325 MG: 325 (65 FE) TAB at 09:27

## 2023-07-27 RX ADMIN — LOSARTAN POTASSIUM 50 MG: 50 TABLET, FILM COATED ORAL at 17:31

## 2023-07-27 RX ADMIN — LEVOTHYROXINE SODIUM 25 MCG: 0.03 TABLET ORAL at 05:45

## 2023-07-27 RX ADMIN — METOPROLOL SUCCINATE 50 MG: 50 TABLET, EXTENDED RELEASE ORAL at 09:27

## 2023-07-27 RX ADMIN — VANCOMYCIN HYDROCHLORIDE 1500 MG: 10 INJECTION, POWDER, LYOPHILIZED, FOR SOLUTION INTRAVENOUS at 14:28

## 2023-07-27 RX ADMIN — ROSUVASTATIN CALCIUM 10 MG: 20 TABLET, FILM COATED ORAL at 20:47

## 2023-07-27 RX ADMIN — FOLIC ACID 1 MG: 1 TABLET ORAL at 09:27

## 2023-07-27 RX ADMIN — SODIUM CHLORIDE: 9 INJECTION, SOLUTION INTRAVENOUS at 12:30

## 2023-07-27 RX ADMIN — CEFEPIME 2000 MG: 2 INJECTION, POWDER, FOR SOLUTION INTRAVENOUS at 16:00

## 2023-07-27 ASSESSMENT — PAIN SCALES - GENERAL
PAINLEVEL_OUTOF10: 3
PAINLEVEL_OUTOF10: 0

## 2023-07-27 ASSESSMENT — PAIN - FUNCTIONAL ASSESSMENT: PAIN_FUNCTIONAL_ASSESSMENT: NONE - DENIES PAIN

## 2023-07-27 NOTE — CONSULTS
Pulmonary Consultation    Admit Date: 7/26/2023  Requesting Physician: Nayla Grove MD    CC:  right loculated pleural effusion    HPI:    57-year-old female, nonsmoker, known to [facility], with past medical history of CAD s/p stents x4, rheumatoid arthritis, thyroid disease, colon polyps, COVID-19 infection 1/27/2022, fungal cavities/ aspergilloma      7/26 presents to [facility] ED for right-sided chest pain, cough, runny nose for 3  Labs significant for creatinine 1.8, hemoglobin 10.4, platelets 683, alk phos 229  Rapid influenza A/B negative  COVID-negative  CT chest interval development of a small/moderate loculated right pleural effusion with some moderate fluid in the major fissure. Otherwise similar findings as seen previously    7/27 pulmonary consulted for right loculated pleural effusion on room air saturating 95%. Patient seen resting in bed in no acute distress. Reports right sided chest pain with deep inspiration. Says she though she had a fractured rib. Symptoms started 5 days prior on 7/22. Denies any sick contacts, fevers, chills, chest pain, tightness, dyspnea, cough, hemoptysis, or wheezing.          PMH:    Past Medical History:   Diagnosis Date    CAD (coronary artery disease)     Colon polyps     Osteoarthritis     Thyroid disease      PSH:    Past Surgical History:   Procedure Laterality Date    ABDOMEN SURGERY      BRONCHOSCOPY N/A 6/2/2022    BRONCHOSCOPY ALVEOLAR LAVAGE performed by Nahomy Ho MD at [address] [place] [address] N/A 6/2/2022    BRONCHOSCOPY DIAGNOSTIC OR CELL [address] ONLY performed by Nahomy Ho MD at [address] [place] [address] N/A 6/2/2022    BRONCHOSCOPY BIOPSY BRONCHUS performed by Nahomy Ho MD at [address] [facility]      COLONOSCOPY N/A 7/23/2020    COLORECTAL CANCER SCREENING, NOT HIGH RISK performed by Angela Rivera MD at [address] [facility]  06/03/2016    Dr. Issa Stephens - Cholelithiasis 4. Nonobstructing bilateral renal calculi. 5. Bilateral perinephric fat stranding could indicate chronic medical renal disease with appropriate clinical history. 6. View lung bases shows a right pleural effusion with adjacent irregular opacities as well as cavitary nodule located in right lower lobe. Additional nodular opacities are present in left lower lobe. Please refer to report from CT chest performed on the same date. CT CHEST WO CONTRAST  Result Date: 7/26/2023  1. Interval development of a small/moderate loculated right pleural effusion with some loculated fluid in the major fissure. . 2. Otherwise similar findings as seen previously. Constitutional:  74-year-old female, nonsmoker, known to [facility], with past medical history of CAD s/p stent, rheumatoid arthritis, thyroid disease, colon polyps, COVID-19 infection 1/27/2022, fungal cavities/ aspergilloma      7/26 presents to [facility] ED for right-sided chest pain, cough, runny nose for 3 days   Labs significant for creatinine 1.8, hemoglobin 10.4, platelets 361, alk phos 229  Rapid influenza A/B negative  COVID-negative  CT chest interval development of a small/moderate loculated right pleural effusion with some moderate fluid in the major fissure. Otherwise similar findings as seen previously    7/27 pulmonary consulted for right loculated pleural effusion on room air saturating 95%        Assessment/Plan:  Right loculated pleural effusion  Remains on room air  Supplemental oxygen for pulse ox greater than 90%  Plans for right thoracentesis with Dr. Anup Hyde tomorrow 7/28/23  Cavitary lung lesions  History of aspergilloma   s/p treatment with Eraxis x28 days per ID 3/2022  Plans for bronchoscopy Monday 7/31/23  ID consulted   JUNIE likely secondary to dehydration   IVF per admitting   Anemia   Rheumatoid arthritis   CAD  HTN/HLD  DVT/PE prophylaxis with SCDs     Thank you for allowing us to see this patient in consultation.   Please

## 2023-07-27 NOTE — PATIENT CARE CONFERENCE
P Quality Flow/Interdisciplinary Rounds Progress Note        Quality Flow Rounds held on July 27, 2023    Disciplines Attending:  Bedside Nurse, , , and Nursing Unit Leadership    Masha Hernandez was admitted on 7/26/2023  4:55 PM    Anticipated Discharge Date:       Disposition:    Marcelino Score:  Marcelino Scale Score: 22    Readmission Risk              Risk of Unplanned Readmission:  12           Discussed patient goal for the day, patient clinical progression, and barriers to discharge.   The following Goal(s) of the Day/Commitment(s) have been identified:  report labs/diagnostics      Mayela Garcia RN  July 27, 2023

## 2023-07-27 NOTE — PROGRESS NOTES
Pharmacy Consultation Note  (Antibiotic Dosing and Monitoring)    Initial consult date: 7/27/23  Consulting physician/provider: Dr. Avi Kate  Drug: Vancomycin  Indication: HAP; 14 days    Age/  Gender Height Weight IBW  Allergy Information   77 y.o./female 5' 6.5\" (168.9 cm) 154 lb (69.9 kg)     Ideal body weight: 60.5 kg (133 lb 4.3 oz)  Adjusted ideal body weight: 64.2 kg (141 lb 9 oz)   Plavix [clopidogrel] and Ace inhibitors      Renal Function:  Recent Labs     07/26/23  1720 07/27/23  0559   BUN 45* 40*   CREATININE 1.8* 1.6*       Intake/Output Summary (Last 24 hours) at 7/27/2023 1306  Last data filed at 7/27/2023 1253  Gross per 24 hour   Intake 960 ml   Output 720 ml   Net 240 ml       Vancomycin Monitoring:  Trough:  No results for input(s): VANCOTROUGH in the last 72 hours. Random:  No results for input(s): VANCORANDOM in the last 72 hours. No results for input(s): Thornell Salaam in the last 72 hours. Historical Cultures:  Organism   Date Value Ref Range Status   06/02/2022 Streptococcus pneumoniae (A)  Final     No results for input(s): BC in the last 72 hours. Vancomycin Administration Times:  Recent vancomycin administrations        No vancomycin IV orders with administrations found. Orders not given:            vancomycin 1500 mg in sodium chloride 0.9% 300 mL IVPB    vancomycin (VANCOCIN) intermittent dosing (placeholder)                    Assessment:  Patient is a 78 y.o. female who has been initiated on vancomycin  Estimated Creatinine Clearance: 29 mL/min (A) (based on SCr of 1.6 mg/dL (H)).   To dose vancomycin, pharmacy will be utilizing dosing based off of levels because of patient's renal impairment/insufficiency    Plan:  Vancomycin 1500 mg IV x 1 dose  Random level tomorrow am on 7/28  Will continue to monitor renal function   Pharmacy to follow      Thank you for the consult,     Chiqui Ortiz, PharmD, BCPS, BCCCP 7/27/2023 1:09 PM  Phone: [phone]

## 2023-07-27 NOTE — H&P
[facility] Internal Medicine  History and Physical      CHIEF COMPLAINT:  pleuritic chest pain    Reason for Admission:  loculated pleural effusion    History Obtained From:  patient    PCP :  Catrina Desai MD    [address] [facility], [address]:      The patient is a 78 y.o. female went to Ed with right sided chest pain. No fevers or chills. Pain was like a stabbing pain. No shortness of breath. In the emergency room patient was noted to have a pleural effusion which seems to be loculated. She has a recent history of pulmonary aspergillosis and was treated for that. Patient has not been eating or drinking. She was also noted to be dehydrated with elevated renal function.     Past Medical History:        Diagnosis Date    CAD (coronary artery disease)     Colon polyps     Osteoarthritis     Thyroid disease      Past Surgical History:        Procedure Laterality Date    ABDOMEN SURGERY      BRONCHOSCOPY N/A 6/2/2022    BRONCHOSCOPY ALVEOLAR LAVAGE performed by Moose Hankins MD at [address] [place] [address] N/A 6/2/2022    BRONCHOSCOPY DIAGNOSTIC OR CELL [address] ONLY performed by Moose Hankins MD at [address] [place] [address] N/A 6/2/2022    BRONCHOSCOPY BIOPSY BRONCHUS performed by Moose Hankins MD at [address] [facility]      COLONOSCOPY N/A 7/23/2020    COLORECTAL CANCER SCREENING, NOT HIGH RISK performed by Adriana Marcelino MD at [address] [facility]  06/03/2016    Dr. Myranda Patricia [facility]         Medications Prior to Admission:    Medications Prior to Admission: Multiple Vitamins-Minerals (PRESERVISION AREDS 2 PO), Take 2 tablets by mouth at bedtime  loperamide (IMODIUM) 2 MG capsule, Take 1 capsule by mouth 4 times daily as needed for Diarrhea  Calcium Carbonate-Vitamin D (CALCIUM 600+D PO), Take 1 tablet by mouth at bedtime Cetirizine HCl (ZYRTEC PO), Take by mouth every evening  aspirin 325 MG EC tablet, Take 1 tablet by mouth daily Nightly  leflunomide (ARAVA) 20 MG tablet, Take 1 tablet by mouth three times a week  predniSONE (DELTASONE) 5 MG tablet, Take 1 tablet by mouth as needed Takes Tuesday, Thursday, Saturday, Sunday  folic acid (FOLVITE) 1 MG tablet, Take 1 tablet by mouth  losartan (COZAAR) 50 MG tablet, Take 1 tablet by mouth every evening  rosuvastatin (CRESTOR) 10 MG tablet, Take 1 tablet by mouth nightly  levothyroxine (SYNTHROID) 25 MCG tablet, Take 1 tablet by mouth every other day Indications: every other day  ferrous sulfate 325 (65 FE) MG tablet, Take 1 tablet by mouth daily (with breakfast)  vitamin D (CHOLECALCIFEROL) 90573 UNIT CAPS, Take 1 capsule by mouth every 14 days Indications: every 2 weeks  metoprolol (TOPROL-XL) 50 MG XL tablet, Take 1 tablet by mouth daily  Coenzyme Q10 (CO Q-10) 400 MG CAPS, Take 400 mg by mouth  Multiple Vitamins-Minerals (CENTRUM SILVER PO), Take 1 tablet by mouth    Allergies:  Plavix [clopidogrel] and Ace inhibitors    Social History:   Social History     Socioeconomic History    Marital status:       Spouse name: Not on file    Number of children: Not on file    Years of education: Not on file    Highest education level: Not on file   Occupational History    Not on file   Tobacco Use    Smoking status: Never    Smokeless tobacco: Never   Vaping Use    Vaping Use: Never used   Substance and Sexual Activity    Alcohol use: No    Drug use: No    Sexual activity: Not on file   Other Topics Concern    Not on file   Social History Narrative    Not on file     Social Determinants of Health     Financial Resource Strain: Not on file   Food Insecurity: Not on file   Transportation Needs: Not on file   Physical Activity: Not on file   Stress: Not on file   Social Connections: Not on file   Intimate Partner Violence: Not on file   Housing Stability: Not on file         Family Bilirubin Urine NEGATIVE NEGATIVE    Ketones, Urine TRACE (A) NEGATIVE mg/dL    Specific Gravity, UA 1.010 1.005 - 1.030    Urine Hgb SMALL (A) NEGATIVE    pH, UA 7.0 5.0 - 9.0    Protein, UA 30 (A) NEGATIVE mg/dL    Urobilinogen, Urine 0.2 0.0 - 1.0 EU/dL    Nitrite, Urine NEGATIVE NEGATIVE    Leukocyte Esterase, Urine LARGE (A) NEGATIVE   Microscopic Urinalysis    Collection Time: 07/27/23  7:30 AM   Result Value Ref Range    WBC, UA 21 TO 50 /HPF    RBC, UA 10 TO 20 /HPF    Bacteria, UA 1+ (A) None   C-Reactive Protein    Collection Time: 07/27/23  9:02 AM   Result Value Ref Range    .0 (H) 0 - 5 mg/L       CT ABDOMEN PELVIS WO CONTRAST Additional Contrast? None   Final Result   1. No bowel obstruction, free air, or free fluid. 2. Hazy opacities are seen along margins of urinary bladder which may   indicate cystitis. Clinical correlation recommended. 3. Cholelithiasis   4. Nonobstructing bilateral renal calculi. 5. Bilateral perinephric fat stranding could indicate chronic medical renal   disease with appropriate clinical history. 6. View lung bases shows a right pleural effusion with adjacent irregular   opacities as well as cavitary nodule located in right lower lobe. Additional   nodular opacities are present in left lower lobe. Please refer to report   from CT chest performed on the same date. CT CHEST WO CONTRAST   Final Result   1. Interval development of a small/moderate loculated right pleural effusion   with some loculated fluid in the major fissure. .   2. Otherwise similar findings as seen previously. US CHEST INCLUDING MEDIASTINUM    (Results Pending)           ASSESSMENT :      Principal Problem:    Loculated pleural effusion  Resolved Problems:    * No resolved hospital problems.  *  Dehydration, acute kidney insufficiency  Coronary artery disease  Hypertension  Hyperlipidemia  Rheumatoid arthritis on arava    Plan :    IV hydration  Tylenol for pain  ID and pulmonary

## 2023-07-27 NOTE — CONSULTS
[address] [facility]  [facility]    Consultation Note     Admit Date: 7/26/2023  4:55 PM    Reason for Consult:   Right-sided loculated pleural effusion    Attending Physician:  Tenzin Bryson MD     Chief Complaint: Right-sided loculated pleural effusion    HISTORY OF PRESENT ILLNESS:   79-year-old female with past medical history of CAD, colon polyps, osteoarthritis, thyroid disease presented with right-sided chest pain. She was admitted in January 2022 when was found to have right lower lobe cavitary pneumonia with respiratory cultures growing Streptococcus pneumoniae. She was found to have right-sided moderate pleural effusion which is loculated. She also has a recent history of pulmonary aspergillosis and was treated for that. ID is consulted for right-sided loculated pleural effusion.     Past Medical History:        Diagnosis Date    CAD (coronary artery disease)     Colon polyps     Osteoarthritis     Thyroid disease      Past Surgical History:        Procedure Laterality Date    ABDOMEN SURGERY      BRONCHOSCOPY N/A 6/2/2022    BRONCHOSCOPY ALVEOLAR LAVAGE performed by Alessia Moore MD at [address] [place] [address] N/A 6/2/2022    BRONCHOSCOPY DIAGNOSTIC OR CELL [address] ONLY performed by Alessia Moore MD at [address] [place] [address] N/A 6/2/2022    BRONCHOSCOPY BIOPSY BRONCHUS performed by Alessia Moore MD at [address] [facility]      COLONOSCOPY N/A 7/23/2020    COLORECTAL CANCER SCREENING, NOT HIGH RISK performed by Artemio Guido MD at [address] [facility]  06/03/2016    Dr. Chalo Thompson - 3.5x38 [facility] to Prox RCA     Current Medications:   Scheduled Meds:   levothyroxine  25 mcg Oral Every Other Day    ferrous sulfate  325 mg Oral Daily with breakfast    [START ON 8/6/2023] vitamin D  50,000 Units Oral Q14 Days    metoprolol succinate  50 mg Oral Daily    rosuvastatin  10 mg

## 2023-07-28 ENCOUNTER — APPOINTMENT (OUTPATIENT)
Dept: ULTRASOUND IMAGING | Age: 78
DRG: 187 | End: 2023-07-28
Payer: MEDICARE

## 2023-07-28 LAB
ANION GAP SERPL CALCULATED.3IONS-SCNC: 9 MMOL/L (ref 7–16)
BASOPHILS # BLD: 0.03 K/UL (ref 0–0.2)
BASOPHILS NFR BLD: 1 % (ref 0–2)
BUN SERPL-MCNC: 33 MG/DL (ref 6–23)
CALCIUM SERPL-MCNC: 8.5 MG/DL (ref 8.6–10.2)
CHLORIDE SERPL-SCNC: 115 MMOL/L (ref 98–107)
CO2 SERPL-SCNC: 17 MMOL/L (ref 22–29)
CREAT SERPL-MCNC: 1.4 MG/DL (ref 0.5–1)
EOSINOPHIL # BLD: 0.24 K/UL (ref 0.05–0.5)
EOSINOPHILS RELATIVE PERCENT: 6 % (ref 0–6)
ERYTHROCYTE [DISTWIDTH] IN BLOOD BY AUTOMATED COUNT: 15.8 % (ref 11.5–15)
GFR SERPL CREATININE-BSD FRML MDRD: 38 ML/MIN/1.73M2
GLUCOSE SERPL-MCNC: 102 MG/DL (ref 74–99)
HCT VFR BLD AUTO: 26.8 % (ref 34–48)
HGB BLD-MCNC: 8.1 G/DL (ref 11.5–15.5)
LYMPHOCYTES NFR BLD: 0.66 K/UL (ref 1.5–4)
LYMPHOCYTES RELATIVE PERCENT: 16 % (ref 20–42)
MCH RBC QN AUTO: 27.4 PG (ref 26–35)
MCHC RBC AUTO-ENTMCNC: 30.2 G/DL (ref 32–34.5)
MCV RBC AUTO: 90.5 FL (ref 80–99.9)
MONOCYTES NFR BLD: 0.28 K/UL (ref 0.1–0.95)
MONOCYTES NFR BLD: 7 % (ref 2–12)
NEUTROPHILS NFR BLD: 70 % (ref 43–80)
NEUTS SEG NFR BLD: 2.79 K/UL (ref 1.8–7.3)
PLATELET CONFIRMATION: NORMAL
PLATELET, FLUORESCENCE: 79 K/UL (ref 130–450)
PMV BLD AUTO: 12.7 FL (ref 7–12)
POTASSIUM SERPL-SCNC: 3.9 MMOL/L (ref 3.5–5)
RBC # BLD AUTO: 2.96 M/UL (ref 3.5–5.5)
RBC # BLD: ABNORMAL 10*6/UL
SODIUM SERPL-SCNC: 141 MMOL/L (ref 132–146)
VANCOMYCIN SERPL-MCNC: 8.8 UG/ML (ref 5–40)
WBC OTHER # BLD: 4 K/UL (ref 4.5–11.5)

## 2023-07-28 PROCEDURE — 85027 COMPLETE CBC AUTOMATED: CPT

## 2023-07-28 PROCEDURE — 6370000000 HC RX 637 (ALT 250 FOR IP): Performed by: INTERNAL MEDICINE

## 2023-07-28 PROCEDURE — 2140000000 HC CCU INTERMEDIATE R&B

## 2023-07-28 PROCEDURE — 2580000003 HC RX 258: Performed by: INTERNAL MEDICINE

## 2023-07-28 PROCEDURE — 80202 ASSAY OF VANCOMYCIN: CPT

## 2023-07-28 PROCEDURE — 36415 COLL VENOUS BLD VENIPUNCTURE: CPT

## 2023-07-28 PROCEDURE — 76604 US EXAM CHEST: CPT

## 2023-07-28 PROCEDURE — 80048 BASIC METABOLIC PNL TOTAL CA: CPT

## 2023-07-28 PROCEDURE — 6360000002 HC RX W HCPCS: Performed by: INTERNAL MEDICINE

## 2023-07-28 RX ADMIN — FERROUS SULFATE TAB 325 MG (65 MG ELEMENTAL FE) 325 MG: 325 (65 FE) TAB at 09:03

## 2023-07-28 RX ADMIN — CEFEPIME 1000 MG: 1 INJECTION, POWDER, FOR SOLUTION INTRAMUSCULAR; INTRAVENOUS at 15:24

## 2023-07-28 RX ADMIN — METOPROLOL SUCCINATE 50 MG: 50 TABLET, EXTENDED RELEASE ORAL at 09:03

## 2023-07-28 RX ADMIN — ROSUVASTATIN CALCIUM 10 MG: 20 TABLET, FILM COATED ORAL at 21:11

## 2023-07-28 RX ADMIN — LEFLUNOMIDE 20 MG: 10 TABLET ORAL at 09:02

## 2023-07-28 RX ADMIN — VANCOMYCIN HYDROCHLORIDE 1500 MG: 10 INJECTION, POWDER, LYOPHILIZED, FOR SOLUTION INTRAVENOUS at 12:45

## 2023-07-28 RX ADMIN — SODIUM CHLORIDE: 9 INJECTION, SOLUTION INTRAVENOUS at 02:03

## 2023-07-28 RX ADMIN — CEFEPIME 1000 MG: 1 INJECTION, POWDER, FOR SOLUTION INTRAMUSCULAR; INTRAVENOUS at 02:07

## 2023-07-28 RX ADMIN — FOLIC ACID 1 MG: 1 TABLET ORAL at 09:02

## 2023-07-28 RX ADMIN — LOSARTAN POTASSIUM 50 MG: 50 TABLET, FILM COATED ORAL at 17:10

## 2023-07-28 ASSESSMENT — PAIN SCALES - GENERAL
PAINLEVEL_OUTOF10: 0

## 2023-07-28 NOTE — PROGRESS NOTES
Pharmacy Consultation Note  (Antibiotic Dosing and Monitoring)    Initial consult date: 7/27/23  Consulting physician/provider: Dr. Jon Howe  Drug: Vancomycin  Indication: HAP; 14 days    Age/  Gender Height Weight IBW  Allergy Information   77 y.o./female 5' 6.5\" (168.9 cm) 154 lb (69.9 kg)     Ideal body weight: 60.5 kg (133 lb 4.3 oz)  Adjusted ideal body weight: 64.2 kg (141 lb 9 oz)   Plavix [clopidogrel] and Ace inhibitors      Renal Function:  Recent Labs     07/26/23  1720 07/27/23  0559 07/28/23  0547   BUN 45* 40* 33*   CREATININE 1.8* 1.6* 1.4*         Intake/Output Summary (Last 24 hours) at 7/28/2023 1140  Last data filed at 7/28/2023 0747  Gross per 24 hour   Intake 780 ml   Output 1850 ml   Net -1070 ml         Vancomycin Monitoring:  Trough:  No results for input(s): VANCOTROUGH in the last 72 hours. Random:    Recent Labs     07/28/23  0517   VANCORANDOM 8.8       No results for input(s): Judmatt Pancho in the last 72 hours. Historical Cultures:  Organism   Date Value Ref Range Status   06/02/2022 Streptococcus pneumoniae (A)  Final     No results for input(s): BC in the last 72 hours. Vancomycin Administration Times:    Recent vancomycin administrations                     vancomycin 1500 mg in sodium chloride 0.9% 300 mL IVPB (mg) 1,500 mg New Bag 07/27/23 1428                  Assessment:  Patient is a 78 y.o. female who has been initiated on vancomycin  Estimated Creatinine Clearance: 33 mL/min (A) (based on SCr of 1.4 mg/dL (H)).   To dose vancomycin, pharmacy will be utilizing dosing based off of levels because of patient's renal impairment/insufficiency  7/28: Scr 1.4 (baseline appears to be around 0.8-0.9), UOP 1.3 mL/kg/hr, random level is 8.8 mcg/mL    Plan:  Vancomycin 1500 mg IV x 1 dose  Random level tomorrow am on 7/29  Will continue to monitor renal function   Pharmacy to follow      Thank you for the consult,     Darcie Watt, PharmD, BCPS, BCCCP 7/28/2023 11:40 AM  Phone: [phone]

## 2023-07-28 NOTE — PLAN OF CARE
Problem: Discharge Planning  Goal: Discharge to home or other facility with appropriate resources  7/27/2023 2208 by Lori Cao RN  Outcome: Progressing     Problem: ABCDS Injury Assessment  Goal: Absence of physical injury  7/27/2023 2208 by Lori Cao RN  Outcome: Progressing

## 2023-07-28 NOTE — PROGRESS NOTES
[address] [facility]  [facility]  Progress note       HISTORY OF PRESENT ILLNESS:   53-year-old female with past medical history of CAD, colon polyps, osteoarthritis, thyroid disease presented with right-sided chest pain. She was admitted in January 2022 when was found to have right lower lobe cavitary pneumonia with respiratory cultures growing Streptococcus pneumoniae. She was found to have right-sided moderate pleural effusion which is loculated. She also has a recent history of pulmonary aspergillosis and was treated for that. ID is consulted for right-sided loculated pleural effusion.     Past Medical History:        Diagnosis Date    CAD (coronary artery disease)     Colon polyps     Osteoarthritis     Thyroid disease      Past Surgical History:        Procedure Laterality Date    ABDOMEN SURGERY      BRONCHOSCOPY N/A 6/2/2022    BRONCHOSCOPY ALVEOLAR LAVAGE performed by Ingrid Choi MD at [address] [place] [address] N/A 6/2/2022    BRONCHOSCOPY DIAGNOSTIC OR CELL [address] ONLY performed by Ingrid Choi MD at [address] [place] [address] N/A 6/2/2022    BRONCHOSCOPY BIOPSY BRONCHUS performed by Ingrid Choi MD at [address] [facility]      COLONOSCOPY N/A 7/23/2020    COLORECTAL CANCER SCREENING, NOT HIGH RISK performed by Julio C Gauthier MD at [address] [facility]  06/03/2016    Dr. Ju Shah - 3.5x38 [facility] to Prox RCA     Current Medications:   Scheduled Meds:   levothyroxine  25 mcg Oral Every Other Day    ferrous sulfate  325 mg Oral Daily with breakfast    [START ON 8/6/2023] vitamin D  50,000 Units Oral Q14 Days    metoprolol succinate  50 mg Oral Daily    rosuvastatin  10 mg Oral Nightly    losartan  50 mg Oral QPM    leflunomide  20 mg Oral Once per day on Mon Wed Fri    folic acid  1 mg Oral Daily    cefepime  1,000 mg IntraVENous Q12H    vancomycin (VANCOCIN) intermittent dosing Toña Carpio in the last 72 hours. No results for input(s): STREPNEUMAGU in the last 72 hours. No results for input(s): LP1UAG in the last 72 hours. No results for input(s): ASO in the last 72 hours. No results for input(s): CULTRESP in the last 72 hours. Assessment:  Right-sided loculated pleural effusion  Patient reports recent history of pulmonary aspergillosis treated outpatient    Plan:    Plan for thoracentesis on Monday  Plan for bronchoscopy on Monday  Cefepime and vancomycin to continue empirically  We will follow pleural fluid analysis and culture and cytology  Elevated CRP noted  Follow respiratory and blood culture    Thank you for having us see this patient in consultation. I will be discussing this case with the treating physicians.       Electronically signed by Karlo North MD on 7/28/2023 at 4:57 PM

## 2023-07-28 NOTE — PROGRESS NOTES
P Quality Flow/Interdisciplinary Rounds Progress Note        Quality Flow Rounds held on July 28, 2023    Disciplines Attending:  Bedside Nurse, , , and Nursing Unit Leadership    Yayo Pa was admitted on 7/26/2023  4:55 PM    Anticipated Discharge Date:       Disposition:    Marcelino Score:  Marcelino Scale Score: 22    Readmission Risk              Risk of Unplanned Readmission:  12           Discussed patient goal for the day, patient clinical progression, and barriers to discharge.   The following Goal(s) of the Day/Commitment(s) have been identified:  Diagnostics - Report Results and Labs - Report Results      Ivan Santamaria RN  July 28, 2023

## 2023-07-28 NOTE — PLAN OF CARE
Problem: Discharge Planning  Goal: Discharge to home or other facility with appropriate resources  Outcome: Progressing  Flowsheets (Taken 7/28/2023 0015 by Va Pillai RN)  Discharge to home or other facility with appropriate resources: Identify barriers to discharge with patient and caregiver     Problem: ABCDS Injury Assessment  Goal: Absence of physical injury  Outcome: Progressing     Problem: Safety - Adult  Goal: Free from fall injury  Outcome: Progressing     Problem: Pain  Goal: Verbalizes/displays adequate comfort level or baseline comfort level  Outcome: Progressing

## 2023-07-28 NOTE — CARE COORDINATION
Social Work/ Case Management Transition of Care Planning ([address], [address]): Pt presented to the hospital with cough/CP/runny nose (loculated pleural effusion). SW met with Pt at bedside who stated that she lives in a one story house with ramp access to enter. Pt lives with her sister in law. Pt states her emergency contact is Danay Olivares [phone]). Pt states her PCP is Dr. Shubham Quiles and pharmacy of choice is [facility] in [place]. Pt states no history of HHC/SNF, Pt states no DME and does not want/need any. 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  Pt is for a [employer]

## 2023-07-29 LAB
1,3 BETA GLUCAN SER-MCNC: <31 PG/ML
1,3 BETA-D-GLUCAN INTERP: NEGATIVE
ANION GAP SERPL CALCULATED.3IONS-SCNC: 9 MMOL/L (ref 7–16)
BUN SERPL-MCNC: 22 MG/DL (ref 6–23)
CALCIUM SERPL-MCNC: 8.3 MG/DL (ref 8.6–10.2)
CHLORIDE SERPL-SCNC: 117 MMOL/L (ref 98–107)
CO2 SERPL-SCNC: 13 MMOL/L (ref 22–29)
CREAT SERPL-MCNC: 1.1 MG/DL (ref 0.5–1)
DATE LAST DOSE: NORMAL
GFR SERPL CREATININE-BSD FRML MDRD: 53 ML/MIN/1.73M2
GLUCOSE SERPL-MCNC: 94 MG/DL (ref 74–99)
MICROORGANISM SPEC CULT: NORMAL
POTASSIUM SERPL-SCNC: 3.6 MMOL/L (ref 3.5–5)
PROCALCITONIN SERPL-MCNC: 0.19 NG/ML (ref 0–0.08)
SODIUM SERPL-SCNC: 139 MMOL/L (ref 132–146)
SPECIMEN DESCRIPTION: NORMAL
TME LAST DOSE: NORMAL H
VANCOMYCIN DOSE: NORMAL MG
VANCOMYCIN SERPL-MCNC: 14.8 UG/ML (ref 5–40)

## 2023-07-29 PROCEDURE — 2140000000 HC CCU INTERMEDIATE R&B

## 2023-07-29 PROCEDURE — 80048 BASIC METABOLIC PNL TOTAL CA: CPT

## 2023-07-29 PROCEDURE — 6360000002 HC RX W HCPCS: Performed by: INTERNAL MEDICINE

## 2023-07-29 PROCEDURE — 80202 ASSAY OF VANCOMYCIN: CPT

## 2023-07-29 PROCEDURE — 36415 COLL VENOUS BLD VENIPUNCTURE: CPT

## 2023-07-29 PROCEDURE — 85027 COMPLETE CBC AUTOMATED: CPT

## 2023-07-29 PROCEDURE — 2580000003 HC RX 258: Performed by: INTERNAL MEDICINE

## 2023-07-29 PROCEDURE — 6370000000 HC RX 637 (ALT 250 FOR IP): Performed by: INTERNAL MEDICINE

## 2023-07-29 RX ADMIN — CEFEPIME 1000 MG: 1 INJECTION, POWDER, FOR SOLUTION INTRAMUSCULAR; INTRAVENOUS at 01:37

## 2023-07-29 RX ADMIN — SODIUM CHLORIDE: 9 INJECTION, SOLUTION INTRAVENOUS at 06:29

## 2023-07-29 RX ADMIN — LEVOTHYROXINE SODIUM 25 MCG: 0.03 TABLET ORAL at 06:28

## 2023-07-29 RX ADMIN — FERROUS SULFATE TAB 325 MG (65 MG ELEMENTAL FE) 325 MG: 325 (65 FE) TAB at 07:43

## 2023-07-29 RX ADMIN — FOLIC ACID 1 MG: 1 TABLET ORAL at 07:43

## 2023-07-29 RX ADMIN — CEFEPIME 1000 MG: 1 INJECTION, POWDER, FOR SOLUTION INTRAMUSCULAR; INTRAVENOUS at 14:20

## 2023-07-29 RX ADMIN — VANCOMYCIN HYDROCHLORIDE 1250 MG: 10 INJECTION, POWDER, LYOPHILIZED, FOR SOLUTION INTRAVENOUS at 10:06

## 2023-07-29 RX ADMIN — METOPROLOL SUCCINATE 50 MG: 50 TABLET, EXTENDED RELEASE ORAL at 07:43

## 2023-07-29 RX ADMIN — ROSUVASTATIN CALCIUM 10 MG: 20 TABLET, FILM COATED ORAL at 20:27

## 2023-07-29 RX ADMIN — LOSARTAN POTASSIUM 50 MG: 50 TABLET, FILM COATED ORAL at 17:11

## 2023-07-29 ASSESSMENT — PAIN SCALES - GENERAL: PAINLEVEL_OUTOF10: 0

## 2023-07-29 NOTE — PATIENT CARE CONFERENCE
P Quality Flow/Interdisciplinary Rounds Progress Note        Quality Flow Rounds held on July 29, 2023    Disciplines Attending:  Bedside Nurse, , , and Nursing Unit Leadership    Lockhart Joaquin was admitted on 7/26/2023  4:55 PM    Anticipated Discharge Date:       Disposition:    Marcelino Score:  Marcelino Scale Score: 22    Readmission Risk              Risk of Unplanned Readmission:  14           Discussed patient goal for the day, patient clinical progression, and barriers to discharge.   The following Goal(s) of the Day/Commitment(s) have been identified:  Report labs/diagnostics      Clara Jiang RN  July 29, 2023

## 2023-07-29 NOTE — PROGRESS NOTES
07/29/2023 07:13 AM    BILITOT 0.9 07/26/2023 05:20 PM    ALKPHOS 229 07/26/2023 05:20 PM    AST 38 07/26/2023 05:20 PM    ALT 39 07/26/2023 05:20 PM       Lab Results   Component Value Date/Time    PROTIME 18.9 07/26/2023 05:20 PM    INR 1.7 07/26/2023 05:20 PM       No results found for: TSH    Lab Results   Component Value Date/Time    COLORU Yellow 07/27/2023 07:30 AM    PHUR 7.0 07/27/2023 07:30 AM    WBCUA 21 TO 50 07/27/2023 07:30 AM    RBCUA 10 TO 20 07/27/2023 07:30 AM    BACTERIA 1+ 07/27/2023 07:30 AM    SPECGRAV 1.010 07/27/2023 07:30 AM    LEUKOCYTESUR LARGE 07/27/2023 07:30 AM    UROBILINOGEN 0.2 07/27/2023 07:30 AM    BILIRUBINUR NEGATIVE 07/27/2023 07:30 AM    GLUCOSEU NEGATIVE 07/27/2023 07:30 AM       No results found for: DDE5AKM, BEART, R1QNGEPG, PHART, THGBART, CYO5NLZ, PO2ART, IKX0RES  Radiology:  US CHEST INCLUDING MEDIASTINUM   Final Result   Small simple appearing right pleural effusion. CT ABDOMEN PELVIS WO CONTRAST Additional Contrast? None   Final Result   1. No bowel obstruction, free air, or free fluid. 2. Hazy opacities are seen along margins of urinary bladder which may   indicate cystitis. Clinical correlation recommended. 3. Cholelithiasis   4. Nonobstructing bilateral renal calculi. 5. Bilateral perinephric fat stranding could indicate chronic medical renal   disease with appropriate clinical history. 6. View lung bases shows a right pleural effusion with adjacent irregular   opacities as well as cavitary nodule located in right lower lobe. Additional   nodular opacities are present in left lower lobe. Please refer to report   from CT chest performed on the same date. CT CHEST WO CONTRAST   Final Result   1. Interval development of a small/moderate loculated right pleural effusion   with some loculated fluid in the major fissure. .   2. Otherwise similar findings as seen previously.              Microbiology:  Pending  No results for input(s): BC in the last 72 hours. No results for input(s): ORG in the last 72 hours. No results for input(s): Thornell Salaam in the last 72 hours. No results for input(s): STREPNEUMAGU in the last 72 hours. No results for input(s): LP1UAG in the last 72 hours. No results for input(s): ASO in the last 72 hours. No results for input(s): CULTRESP in the last 72 hours.     Assessment:  Right-sided loculated pleural effusion  Patient reports recent history of pulmonary aspergillosis treated outpatient    Plan:    Plan for thoracentesis on Monday  Plan for bronchoscopy on Monday  Cefepime and vancomycin to continue empirically  We will follow pleural fluid analysis and culture and cytology  Elevated CRP noted  Follow respiratory and blood culture    Electronically signed by LESTER Staton CNP on 7/29/2023 at 2:55 PM

## 2023-07-29 NOTE — PLAN OF CARE
Problem: Discharge Planning  Goal: Discharge to home or other facility with appropriate resources  7/29/2023 1149 by Dontrell Motley RN  Outcome: Progressing  7/29/2023 0324 by Gracie Washington RN  Outcome: Progressing     Problem: ABCDS Injury Assessment  Goal: Absence of physical injury  7/29/2023 1149 by Megan Rossi RN  Outcome: Progressing  7/29/2023 0324 by Gracie Washington RN  Outcome: Progressing     Problem: Safety - Adult  Goal: Free from fall injury  Outcome: Progressing     Problem: Pain  Goal: Verbalizes/displays adequate comfort level or baseline comfort level  Outcome: Progressing

## 2023-07-29 NOTE — PROGRESS NOTES
Pharmacy Consultation Note  (Antibiotic Dosing and Monitoring)    Initial consult date: 7/27/23  Consulting physician/provider: Dr. Slime August  Drug: Vancomycin  Indication: HAP; 14 days    Age/  Gender Height Weight IBW  Allergy Information   77 y.o./female 5' 6.5\" (168.9 cm) 154 lb (69.9 kg)     Ideal body weight: 60.5 kg (133 lb 4.3 oz)  Adjusted ideal body weight: 64.2 kg (141 lb 9 oz)   Plavix [clopidogrel] and Ace inhibitors      Renal Function:  Recent Labs     07/27/23  0559 07/28/23  0547 07/29/23  0713   BUN 40* 33* 22   CREATININE 1.6* 1.4* 1.1*         Intake/Output Summary (Last 24 hours) at 7/29/2023 0925  Last data filed at 7/29/2023 4321  Gross per 24 hour   Intake --   Output 2550 ml   Net -2550 ml         Vancomycin Monitoring:  Trough:  No results for input(s): VANCOTROUGH in the last 72 hours. Random:    Recent Labs     07/28/23  0517 07/29/23  0713   VANCORANDOM 8.8 14.8         No results for input(s): Lorrain Revels in the last 72 hours. Historical Cultures:  Organism   Date Value Ref Range Status   06/02/2022 Streptococcus pneumoniae (A)  Final     No results for input(s): BC in the last 72 hours. Vancomycin Administration Times:    Recent vancomycin administrations                     vancomycin 1500 mg in sodium chloride 0.9% 300 mL IVPB (mg) 1,500 mg New Bag 07/27/23 1428                  Assessment:  Patient is a 78 y.o. female who has been initiated on vancomycin  Estimated Creatinine Clearance: 42 mL/min (A) (based on SCr of 1.1 mg/dL (H)).     Plan:  Vancomycin 1500 mg IV q24h    Thank you for the consult,   Cory Brand, PharmD, BCPS, BCCCP 7/29/2023 9:25 AM

## 2023-07-30 ENCOUNTER — ANESTHESIA EVENT (OUTPATIENT)
Dept: ENDOSCOPY | Age: 78
DRG: 187 | End: 2023-07-30
Payer: MEDICARE

## 2023-07-30 LAB
BASOPHILS # BLD: 0.03 K/UL (ref 0–0.2)
BASOPHILS NFR BLD: 1 % (ref 0–2)
EOSINOPHIL # BLD: 0.24 K/UL (ref 0.05–0.5)
EOSINOPHILS RELATIVE PERCENT: 8 % (ref 0–6)
ERYTHROCYTE [DISTWIDTH] IN BLOOD BY AUTOMATED COUNT: 15.9 % (ref 11.5–15)
HCT VFR BLD AUTO: 27.6 % (ref 34–48)
HGB BLD-MCNC: 7.6 G/DL (ref 11.5–15.5)
IMM GRANULOCYTES # BLD AUTO: <0.03 K/UL (ref 0–0.58)
IMM GRANULOCYTES NFR BLD: 0 % (ref 0–5)
LYMPHOCYTES NFR BLD: 0.55 K/UL (ref 1.5–4)
LYMPHOCYTES RELATIVE PERCENT: 17 % (ref 20–42)
MCH RBC QN AUTO: 26.9 PG (ref 26–35)
MCHC RBC AUTO-ENTMCNC: 27.5 G/DL (ref 32–34.5)
MCV RBC AUTO: 97.5 FL (ref 80–99.9)
MONOCYTES NFR BLD: 0.35 K/UL (ref 0.1–0.95)
MONOCYTES NFR BLD: 11 % (ref 2–12)
NEUTROPHILS NFR BLD: 63 % (ref 43–80)
NEUTS SEG NFR BLD: 2 K/UL (ref 1.8–7.3)
PLATELET # BLD AUTO: 83 K/UL (ref 130–450)
PLATELET CONFIRMATION: NORMAL
PMV BLD AUTO: 12.9 FL (ref 7–12)
RBC # BLD AUTO: 2.83 M/UL (ref 3.5–5.5)
RBC # BLD: ABNORMAL 10*6/UL
WBC OTHER # BLD: 3.2 K/UL (ref 4.5–11.5)

## 2023-07-30 PROCEDURE — 6360000002 HC RX W HCPCS: Performed by: INTERNAL MEDICINE

## 2023-07-30 PROCEDURE — 2580000003 HC RX 258: Performed by: INTERNAL MEDICINE

## 2023-07-30 PROCEDURE — 6370000000 HC RX 637 (ALT 250 FOR IP): Performed by: INTERNAL MEDICINE

## 2023-07-30 PROCEDURE — 2140000000 HC CCU INTERMEDIATE R&B

## 2023-07-30 RX ADMIN — FOLIC ACID 1 MG: 1 TABLET ORAL at 07:59

## 2023-07-30 RX ADMIN — FERROUS SULFATE TAB 325 MG (65 MG ELEMENTAL FE) 325 MG: 325 (65 FE) TAB at 07:59

## 2023-07-30 RX ADMIN — VANCOMYCIN HYDROCHLORIDE 1250 MG: 10 INJECTION, POWDER, LYOPHILIZED, FOR SOLUTION INTRAVENOUS at 08:04

## 2023-07-30 RX ADMIN — LOSARTAN POTASSIUM 50 MG: 50 TABLET, FILM COATED ORAL at 17:13

## 2023-07-30 RX ADMIN — CEFEPIME 2000 MG: 2 INJECTION, POWDER, FOR SOLUTION INTRAVENOUS at 02:45

## 2023-07-30 RX ADMIN — ROSUVASTATIN CALCIUM 10 MG: 20 TABLET, FILM COATED ORAL at 20:03

## 2023-07-30 RX ADMIN — METOPROLOL SUCCINATE 50 MG: 50 TABLET, EXTENDED RELEASE ORAL at 07:59

## 2023-07-30 RX ADMIN — CEFEPIME 2000 MG: 2 INJECTION, POWDER, FOR SOLUTION INTRAVENOUS at 14:43

## 2023-07-30 NOTE — PROGRESS NOTES
Pharmacy Consultation Note  (Antibiotic Dosing and Monitoring)    Initial consult date: 7/27/23  Consulting physician/provider: Dr. Fahad Sales  Drug: Vancomycin  Indication: HAP; 14 days    Age/  Gender Height Weight IBW  Allergy Information   77 y.o./female 5' 6.5\" (168.9 cm) 154 lb (69.9 kg)     Ideal body weight: 60.5 kg (133 lb 4.3 oz)  Adjusted ideal body weight: 64.2 kg (141 lb 9 oz)   Plavix [clopidogrel] and Ace inhibitors      Renal Function:  Recent Labs     07/28/23  0547 07/29/23  0713   BUN 33* 22   CREATININE 1.4* 1.1*         Intake/Output Summary (Last 24 hours) at 7/30/2023 4347  Last data filed at 7/30/2023 0003  Gross per 24 hour   Intake 540 ml   Output 1540 ml   Net -1000 ml         Vancomycin Monitoring:  Trough:  No results for input(s): VANCOTROUGH in the last 72 hours. Random:    Recent Labs     07/28/23  0517 07/29/23  0713   VANCORANDOM 8.8 14.8         No results for input(s): Buzzy Rue in the last 72 hours. Historical Cultures:  Organism   Date Value Ref Range Status   06/02/2022 Streptococcus pneumoniae (A)  Final     No results for input(s): BC in the last 72 hours. Vancomycin Administration Times:    Recent vancomycin administrations                     vancomycin 1500 mg in sodium chloride 0.9% 300 mL IVPB (mg) 1,500 mg New Bag 07/27/23 1428                  Assessment:  Patient is a 78 y.o. female who has been initiated on vancomycin  Estimated Creatinine Clearance: 42 mL/min (A) (based on SCr of 1.1 mg/dL (H)).     Plan:  Vancomycin 1250 mg IV q24h - sCr not resulted today    Thank you for the consult,   Jose Goddard, PharmD, BCPS, BCCCP 7/30/2023 7:12 AM

## 2023-07-30 NOTE — PATIENT CARE CONFERENCE
P Quality Flow/Interdisciplinary Rounds Progress Note        Quality Flow Rounds held on July 30, 2023    Disciplines Attending:  Bedside Nurse, , , and Nursing Unit Leadership    Moe Contreras was admitted on 7/26/2023  4:55 PM    Anticipated Discharge Date:       Disposition:    Marcelino Score:  Marcelino Scale Score: 22    Readmission Risk              Risk of Unplanned Readmission:  13           Discussed patient goal for the day, patient clinical progression, and barriers to discharge.   The following Goal(s) of the Day/Commitment(s) have been identified:  discharge [address] Jessica Greene RN  July 30, 2023

## 2023-07-30 NOTE — PLAN OF CARE
Problem: Discharge Planning  Goal: Discharge to home or other facility with appropriate resources  7/30/2023 1001 by Jayden Sheridan RN  Outcome: Progressing  7/30/2023 0044 by Alcides Camp RN  Outcome: Progressing     Problem: ABCDS Injury Assessment  Goal: Absence of physical injury  7/30/2023 1001 by Jayden Sheridan RN  Outcome: Progressing  7/30/2023 0044 by Alcides Camp RN  Outcome: Progressing     Problem: Safety - Adult  Goal: Free from fall injury  7/30/2023 1001 by Jayden Sheridan RN  Outcome: Progressing  7/30/2023 0044 by Alcides Camp RN  Outcome: Progressing     Problem: Pain  Goal: Verbalizes/displays adequate comfort level or baseline comfort level  7/30/2023 1001 by Jayden Sheridan RN  Outcome: Progressing  7/30/2023 0044 by Alcides Camp RN  Outcome: Progressing

## 2023-07-30 NOTE — PLAN OF CARE
Problem: Discharge Planning  Goal: Discharge to home or other facility with appropriate resources  7/30/2023 0044 by Annetta Schaumann, RN  Outcome: Progressing  7/29/2023 1149 by Michele Perkins RN  Outcome: Progressing     Problem: ABCDS Injury Assessment  Goal: Absence of physical injury  7/30/2023 0044 by Annetta Schaumann, RN  Outcome: Progressing  7/29/2023 1149 by Michele Perkins RN  Outcome: Progressing     Problem: Safety - Adult  Goal: Free from fall injury  7/30/2023 0044 by Annetta Schaumann, RN  Outcome: Progressing  7/29/2023 1149 by Michele Perkins RN  Outcome: Progressing     Problem: Pain  Goal: Verbalizes/displays adequate comfort level or baseline comfort level  7/30/2023 0044 by Annetta Schaumann, RN  Outcome: Progressing  7/29/2023 1149 by Michele Perkins RN  Outcome: Progressing

## 2023-07-30 NOTE — PROGRESS NOTES
[facility]  [facility]  Progress note       HISTORY OF PRESENT ILLNESS:   79-year-old female with past medical history of CAD, colon polyps, osteoarthritis, thyroid disease presented with right-sided chest pain. She was admitted in January 2022 when was found to have right lower lobe cavitary pneumonia with respiratory cultures growing Streptococcus pneumoniae. She was found to have right-sided moderate pleural effusion which is loculated. She also has a recent history of pulmonary aspergillosis and was treated for that. ID is consulted for right-sided loculated pleural effusion. 7/30: In bed, doing well, in no apparent distress.   She says she feels cold, but doing okay  Afebrile     Past Medical History:        Diagnosis Date    CAD (coronary artery disease)     Colon polyps     Osteoarthritis     Thyroid disease      Past Surgical History:        Procedure Laterality Date    ABDOMEN SURGERY      BRONCHOSCOPY N/A 6/2/2022    BRONCHOSCOPY ALVEOLAR LAVAGE performed by Mike Cifuentes MD at [address] [place] [address] N/A 6/2/2022    BRONCHOSCOPY DIAGNOSTIC OR CELL [address] ONLY performed by Mike Cifuentes MD at [address] [place] [address] N/A 6/2/2022    BRONCHOSCOPY BIOPSY BRONCHUS performed by Mike Cifuentes MD at [address] [facility]      COLONOSCOPY N/A 7/23/2020    COLORECTAL CANCER SCREENING, NOT HIGH RISK performed by Nikolai Low MD at [address] [facility]  06/03/2016    Dr. Jacquline Carrel - 3.5x38 Deloise Yi ROSAMARIA to Prox RCA     Current Medications:   Scheduled Meds:   cefepime  2,000 mg IntraVENous Q12H    vancomycin  1,250 mg IntraVENous Q24H    levothyroxine  25 mcg Oral Every Other Day    ferrous sulfate  325 mg Oral Daily with breakfast    [START ON 8/6/2023] vitamin D  50,000 Units Oral Q14 Days    metoprolol succinate  50 mg Oral Daily    rosuvastatin  10 mg Oral Nightly    losartan  50 07/26/2023 05:20 PM    ALT 39 07/26/2023 05:20 PM       Lab Results   Component Value Date/Time    PROTIME 18.9 07/26/2023 05:20 PM    INR 1.7 07/26/2023 05:20 PM       No results found for: TSH    Lab Results   Component Value Date/Time    COLORU Yellow 07/27/2023 07:30 AM    PHUR 7.0 07/27/2023 07:30 AM    WBCUA 21 TO 50 07/27/2023 07:30 AM    RBCUA 10 TO 20 07/27/2023 07:30 AM    BACTERIA 1+ 07/27/2023 07:30 AM    SPECGRAV 1.010 07/27/2023 07:30 AM    LEUKOCYTESUR LARGE 07/27/2023 07:30 AM    UROBILINOGEN 0.2 07/27/2023 07:30 AM    BILIRUBINUR NEGATIVE 07/27/2023 07:30 AM    GLUCOSEU NEGATIVE 07/27/2023 07:30 AM       No results found for: ADS0ADR, BEART, B3PYJJAE, PHART, THGBART, PGK1NPX, PO2ART, SXQ7HLF  Radiology:  [address] [facility],[address]   Final Result   Small simple appearing right pleural effusion. CT ABDOMEN PELVIS WO CONTRAST Additional Contrast? None   Final Result   1. No bowel obstruction, free air, or free fluid. 2. Hazy opacities are seen along margins of urinary bladder which may   indicate cystitis. Clinical correlation recommended. 3. Cholelithiasis   4. Nonobstructing bilateral renal calculi. 5. Bilateral perinephric fat stranding could indicate chronic medical renal   disease with appropriate clinical history. 6. View lung bases shows a right pleural effusion with adjacent irregular   opacities as well as cavitary nodule located in right lower lobe. Additional   nodular opacities are present in left lower lobe. Please refer to report   from CT chest performed on the same date. CT CHEST WO CONTRAST   Final Result   1. Interval development of a small/moderate loculated right pleural effusion   with some loculated fluid in the major fissure. .   2. Otherwise similar findings as seen previously. Microbiology:  Pending  No results for input(s): BC in the last 72 hours. No results for input(s): ORG in the last 72 hours.   No results for input(s): Caludia Vasquez in the last 72 hours. No results for input(s): STREPNEUMAGU in the last 72 hours. No results for input(s): LP1UAG in the last 72 hours. No results for input(s): ASO in the last 72 hours. No results for input(s): CULTRESP in the last 72 hours.     Assessment:  Right-sided loculated pleural effusion  Patient reports recent history of pulmonary aspergillosis treated outpatient    Plan:    Plan for bronchoscopy on Monday  Cefepime and vancomycin to continue empirically  We will follow pleural fluid analysis and culture and cytology  Elevated CRP noted  Follow respiratory and blood culture    Electronically signed by LESTER Boykin CNP on 7/30/2023 at 2:04 PM

## 2023-07-31 ENCOUNTER — ANESTHESIA (OUTPATIENT)
Dept: ENDOSCOPY | Age: 78
DRG: 187 | End: 2023-07-31
Payer: MEDICARE

## 2023-07-31 PROCEDURE — 0B9F8ZX DRAINAGE OF RIGHT LOWER LUNG LOBE, VIA NATURAL OR ARTIFICIAL OPENING ENDOSCOPIC, DIAGNOSTIC: ICD-10-PCS | Performed by: INTERNAL MEDICINE

## 2023-07-31 PROCEDURE — 87106 FUNGI IDENTIFICATION YEAST: CPT

## 2023-07-31 PROCEDURE — 2580000003 HC RX 258

## 2023-07-31 PROCEDURE — 0B9F8ZX DRAINAGE OF RIGHT LOWER LUNG LOBE, VIA NATURAL OR ARTIFICIAL OPENING ENDOSCOPIC, DIAGNOSTIC: ICD-10-PCS

## 2023-07-31 PROCEDURE — 3700000000 HC ANESTHESIA ATTENDED CARE: Performed by: INTERNAL MEDICINE

## 2023-07-31 PROCEDURE — 3609010800 HC BRONCHOSCOPY ALVEOLAR LAVAGE: Performed by: INTERNAL MEDICINE

## 2023-07-31 PROCEDURE — 89051 BODY FLUID CELL COUNT: CPT

## 2023-07-31 PROCEDURE — 87102 FUNGUS ISOLATION CULTURE: CPT

## 2023-07-31 PROCEDURE — 3700000001 HC ADD 15 MINUTES (ANESTHESIA): Performed by: INTERNAL MEDICINE

## 2023-07-31 PROCEDURE — 87205 SMEAR GRAM STAIN: CPT

## 2023-07-31 PROCEDURE — 88112 CYTOPATH CELL ENHANCE TECH: CPT

## 2023-07-31 PROCEDURE — 6370000000 HC RX 637 (ALT 250 FOR IP): Performed by: INTERNAL MEDICINE

## 2023-07-31 PROCEDURE — 7100000001 HC PACU RECOVERY - ADDTL 15 MIN: Performed by: INTERNAL MEDICINE

## 2023-07-31 PROCEDURE — 6360000002 HC RX W HCPCS

## 2023-07-31 PROCEDURE — 6360000002 HC RX W HCPCS: Performed by: INTERNAL MEDICINE

## 2023-07-31 PROCEDURE — 2580000003 HC RX 258: Performed by: INTERNAL MEDICINE

## 2023-07-31 PROCEDURE — 87070 CULTURE OTHR SPECIMN AEROBIC: CPT

## 2023-07-31 PROCEDURE — 87116 MYCOBACTERIA CULTURE: CPT

## 2023-07-31 PROCEDURE — 2500000003 HC RX 250 WO HCPCS: Performed by: INTERNAL MEDICINE

## 2023-07-31 PROCEDURE — 87015 SPECIMEN INFECT AGNT CONCNTJ: CPT

## 2023-07-31 PROCEDURE — 2709999900 HC NON-CHARGEABLE SUPPLY: Performed by: INTERNAL MEDICINE

## 2023-07-31 PROCEDURE — 87281 PNEUMOCYSTIS CARINII AG IF: CPT

## 2023-07-31 PROCEDURE — 2140000000 HC CCU INTERMEDIATE R&B

## 2023-07-31 PROCEDURE — 7100000000 HC PACU RECOVERY - FIRST 15 MIN: Performed by: INTERNAL MEDICINE

## 2023-07-31 PROCEDURE — 87305 ASPERGILLUS AG IA: CPT

## 2023-07-31 PROCEDURE — 87206 SMEAR FLUORESCENT/ACID STAI: CPT

## 2023-07-31 RX ORDER — PROPOFOL 10 MG/ML
INJECTION, EMULSION INTRAVENOUS PRN
Status: DISCONTINUED | OUTPATIENT
Start: 2023-07-31 | End: 2023-07-31 | Stop reason: SDUPTHER

## 2023-07-31 RX ORDER — LIDOCAINE HYDROCHLORIDE 20 MG/ML
INJECTION, SOLUTION EPIDURAL; INFILTRATION; INTRACAUDAL; PERINEURAL PRN
Status: DISCONTINUED | OUTPATIENT
Start: 2023-07-31 | End: 2023-07-31 | Stop reason: ALTCHOICE

## 2023-07-31 RX ORDER — SODIUM CHLORIDE 9 MG/ML
INJECTION, SOLUTION INTRAVENOUS CONTINUOUS PRN
Status: DISCONTINUED | OUTPATIENT
Start: 2023-07-31 | End: 2023-07-31 | Stop reason: SDUPTHER

## 2023-07-31 RX ADMIN — PROPOFOL 130 MG: 10 INJECTION, EMULSION INTRAVENOUS at 10:58

## 2023-07-31 RX ADMIN — CEFEPIME 2000 MG: 2 INJECTION, POWDER, FOR SOLUTION INTRAVENOUS at 04:04

## 2023-07-31 RX ADMIN — FERROUS SULFATE TAB 325 MG (65 MG ELEMENTAL FE) 325 MG: 325 (65 FE) TAB at 12:33

## 2023-07-31 RX ADMIN — FOLIC ACID 1 MG: 1 TABLET ORAL at 12:33

## 2023-07-31 RX ADMIN — LEFLUNOMIDE 20 MG: 10 TABLET ORAL at 12:34

## 2023-07-31 RX ADMIN — VANCOMYCIN HYDROCHLORIDE 1250 MG: 10 INJECTION, POWDER, LYOPHILIZED, FOR SOLUTION INTRAVENOUS at 12:31

## 2023-07-31 RX ADMIN — CEFEPIME 2000 MG: 2 INJECTION, POWDER, FOR SOLUTION INTRAVENOUS at 16:23

## 2023-07-31 RX ADMIN — LEVOTHYROXINE SODIUM 25 MCG: 0.03 TABLET ORAL at 06:04

## 2023-07-31 RX ADMIN — SODIUM CHLORIDE: 9 INJECTION, SOLUTION INTRAVENOUS at 10:53

## 2023-07-31 RX ADMIN — ROSUVASTATIN CALCIUM 10 MG: 20 TABLET, FILM COATED ORAL at 20:28

## 2023-07-31 RX ADMIN — METOPROLOL SUCCINATE 50 MG: 50 TABLET, EXTENDED RELEASE ORAL at 12:36

## 2023-07-31 ASSESSMENT — PAIN SCALES - GENERAL: PAINLEVEL_OUTOF10: 0

## 2023-07-31 ASSESSMENT — LIFESTYLE VARIABLES: SMOKING_STATUS: 0

## 2023-07-31 NOTE — PROGRESS NOTES
Pharmacy Consultation Note  (Antibiotic Dosing and Monitoring)    Initial consult date: 7/27/23  Consulting physician/provider: Dr. Jana Guzman  Drug: Vancomycin  Indication: HAP; 14 days    Age/  Gender Height Weight IBW  Allergy Information   77 y.o./female 5' 6.5\" (168.9 cm) 154 lb (69.9 kg)     Ideal body weight: 60.5 kg (133 lb 4.3 oz)  Adjusted ideal body weight: 64.2 kg (141 lb 9 oz)   Plavix [clopidogrel] and Ace inhibitors      Renal Function:  Recent Labs     07/29/23  0713   BUN 22   CREATININE 1.1*         Intake/Output Summary (Last 24 hours) at 7/31/2023 1523  Last data filed at 7/31/2023 1105  Gross per 24 hour   Intake 650 ml   Output 2000 ml   Net -1350 ml         Vancomycin Monitoring:  Trough:  No results for input(s): VANCOTROUGH in the last 72 hours. Random:    Recent Labs     07/29/23  0713   VANCORANDOM 14.8         No results for input(s): Lee Jhoanagne in the last 72 hours. Historical Cultures:  Organism   Date Value Ref Range Status   06/02/2022 Streptococcus pneumoniae (A)  Final     No results for input(s): BC in the last 72 hours. Vancomycin Administration Times:    Recent vancomycin administrations                     vancomycin (VANCOCIN) 1,250 mg in sodium chloride 0.9 % 250 mL IVPB (mg) 1,250 mg New Bag 07/31/23 1231     1,250 mg New Bag 07/30/23 0804     1,250 mg New Bag 07/29/23 1006                  Assessment:  Patient is a 78 y.o. female who has been initiated on vancomycin  Estimated Creatinine Clearance: 42 mL/min (A) (based on SCr of 1.1 mg/dL (H)).     Plan:  Continue vancomycin 1250 mg IV q24h  Vancomycin trough tomorrow at 1130    Thank you for the consult,     Anabel Garcia, PharmD, BCPS, BCCCP 7/31/2023 3:29 PM  Phone: [phone]

## 2023-07-31 NOTE — CARE COORDINATION
7/31/23  Spoke with patient regarding transition of care. Patient admitted for JUNIE and pleural effusion. Patient states she lives at home and cares for her bed ridden sister. Patient is independent with all ADL's and drives. Patient does not use any DME. Patient has no active home health but [facility] does come out for her sister. Patient is currently on IV Vanc and Cefepime empirically. Patient was choiced for a home care provider should IV therapy continue post discharge and chose [facility] with referral called. SOC is Friday 8/4/23 but Mckenna Wright at [facility] states patient can be moved up to a flex sport if needed. Discharge goal is to return home when stable. PCP is Dr. Argenis Price and pharmacy choice is [facility] for emergency meds and mail order for routine meds. /CM to follow. Electronically signed by JL Owusu on 7/31/2023 at 3:57 PM     Case Management Assessment  Initial Evaluation    Date/Time of Evaluation: 7/31/2023 3:58 PM  Assessment Completed by: JL Owusu    If patient is discharged prior to next notation, then this note serves as note for discharge by case management. Patient Name: Zarina Avalos                   YOB: 1945  Diagnosis: JUNIE (acute kidney injury) ([address]) [N17.9]  Loculated pleural effusion [J90]                   Date / Time: 7/26/2023  4:55 PM    Patient Admission Status: Inpatient   Readmission Risk (Low < 19, Mod (19-27), High > 27): Readmission Risk Score: 11.8    Current PCP: Dorothy Bowman MD  PCP verified by ? Yes    Chart Reviewed: Yes      History Provided by: Patient  Patient Orientation: Alert and Oriented, Person, Place, Situation    Patient Cognition: Alert    Hospitalization in the last 30 days (Readmission):  No    If yes, Readmission Assessment in  Navigator will be completed.     Advance Directives:      Code Status: Full Code   Patient's Primary Decision Maker is:        Discharge Planning:    Patient lives with: Family Members (sister in law) Type of Home: House  Primary Care Giver: Self  Patient Support Systems include: Friends/Neighbors   Current Financial resources:    Current community resources:    Current services prior to admission: None            Current DME:              Type of Home Care services:  Housekeeping    ADLS  Prior functional level: Independent in ADLs/IADLs  Current functional level: Independent in ADLs/IADLs    PT AM-PAC:   /24  OT AM-PAC:   /24    Family can provide assistance at DC: No  Would you like Case Management to discuss the discharge plan with any other family members/significant others, and if so, who? No  Plans to Return to Present Housing: Yes  Other Identified Issues/Barriers to RETURNING to current housing: none   Potential Assistance needed at discharge: N/A            Potential DME:    Patient expects to discharge to: [address] [place] [address] for transportation at discharge:      Financial    Payor: Vianca Zapata / Plan: Baldo Samaniego / Product Type: *No Product type* /     Does insurance require precert for SNF: Yes    Potential assistance Purchasing Medications:    Meds-to-Beds request: Yes      Jun Chavez [address] [phone] [place] [address] [address] [facility]  Phone: [phone] Fax: [phone]    [address] [place] [address]  [address] [place] [address]  Phone: [phone] Fax: [phone]    [id number] [address] [facility], [address] [facility]  [address] [address]  Phone: [phone] Fax: [phone]      Notes:    Factors facilitating achievement of predicted outcomes:  Motivated, Cooperative, Pleasant, and Sense of humor    Barriers to discharge: none potential for  IV ATB with home care referral called     Additional Case Management Notes: See above    The Plan for

## 2023-07-31 NOTE — ANESTHESIA PRE PROCEDURE
hyperlipidemia      ECG reviewed  Rhythm: regular  Rate: normal           Beta Blocker:  Dose within 24 Hrs         Neuro/Psych:   Negative Neuro/Psych ROS              GI/Hepatic/Renal: Neg GI/Hepatic/Renal ROS           ROS comment: Colon polyps. Endo/Other:    (+) hypothyroidism: arthritis: OA and rheumatoid. , .                 Abdominal:             Vascular: negative vascular ROS. Other Findings:             Anesthesia Plan      MAC     ASA 3       Induction: intravenous. Anesthetic plan and risks discussed with patient. Plan discussed with attending.                     Carmella Joy, APRN - CRNA   7/31/2023

## 2023-07-31 NOTE — ANESTHESIA POSTPROCEDURE EVALUATION
Department of Anesthesiology  Postprocedure Note    Patient: Dru Toney  MRN: [id number]  YOB: 1945  Date of evaluation: 7/31/2023      Procedure Summary     Date: 07/31/23 Room / Location: [address] [place] [address] / [facility]    Anesthesia Start: 8529 Anesthesia Stop: 6165    Procedure: BRONCHOSCOPY ALVEOLAR LAVAGE Diagnosis:       Mucus plugging of bronchi      (Mucus plugging of bronchi [T17.500A])    Surgeons: Vijaya Abdullahi MD Responsible Provider: Maggie Mcfadden DO    Anesthesia Type: MAC ASA Status: 3          Anesthesia Type: No value filed.     Cate Phase I: Cate Score: 10    Cate Phase II:        Anesthesia Post Evaluation    Patient location during evaluation: PACU  Patient participation: complete - patient participated  Level of consciousness: awake and alert  Pain score: 1  Airway patency: patent  Nausea & Vomiting: no nausea and no vomiting  Complications: no  Cardiovascular status: hemodynamically stable  Respiratory status: acceptable  Hydration status: euvolemic  Pain management: adequate

## 2023-07-31 NOTE — OP NOTE
[address]    Pulmonary/critical care procedure note    Flexible Fiberoptic Bronchoscopy NOTE    Patient: Masha Hernandez    MRN: [id number]  : 1945    Date: 2023  Time: 11:21 AM     Primary Care Physician:      Ricarda Severin, MD     [phone]    Laboratory Work:  Lab Results   Component Value Date    INR 1.7 2023    INR 1.4 2022    INR 1.3 2016    PROTIME 18.9 (H) 2023    PROTIME 15.2 (H) 2022    PROTIME 14.0 (H) 2016     Lab Results   Component Value Date    WBC 3.2 (L) 2023    HGB 7.6 (L) 2023    HCT 27.6 (L) 2023    MCV 97.5 2023    PLT 83 (L) 2023    LYMPHOPCT 17 (L) 2023    RBC 2.83 (L) 2023    MCH 26.9 2023    MCHC 27.5 (L) 2023    RDW 15.9 (H) 2023    NEUTOPHILPCT 63 2023    MONOPCT 11 2023    BASOPCT 1 2023    NEUTROABS 2.00 2023    LYMPHSABS 0.55 (L) 2023    MONOSABS 0.35 2023    EOSABS 0.24 2023    BASOSABS 0.03 2023     Lab Results   Component Value Date/Time     2023 07:13 AM    K 3.6 2023 07:13 AM    K 4.2 2022 03:32 AM     2023 07:13 AM    CO2 13 2023 07:13 AM    BUN 22 2023 07:13 AM    CREATININE 1.1 2023 07:13 AM    GLUCOSE 94 2023 07:13 AM    CALCIUM 8.3 2023 07:13 AM      Attending Physician: Dr. Rachelle Carrasquillo    Assistant(s): Anesthesia Dept, Endoscopy    Pre-Operative Medications: Per Anesthesia Dept     Intra-Operative Medications: Per Anesthesia Dept, 2 cc of 2% lidocaine to Vocal Cords, 2 cc of 2% lidocaine to Trachea       Anesthesia: MAC (Monitored Anesthesia Care) with Conscious Sedation    Pre-Procedure Diagnosis: RLL Pneumonia, R/O Fungus Ball Cavity    Operation/Procedure:   Flexible Fiberoptic Bronchoscopy  RLL BAL    Post-Procedure Diagnosis: RLL Pneumonia, R/O Fungus Ball Cavity    Consent: Consent was obtained prior to procedure.

## 2023-07-31 NOTE — PATIENT CARE CONFERENCE
P Quality Flow/Interdisciplinary Rounds Progress Note        Quality Flow Rounds held on July 31, 2023    Disciplines Attending:  Bedside Nurse, , , and Nursing Unit Leadership    Lockhart Joaquin was admitted on 7/26/2023  4:55 PM    Anticipated Discharge Date:       Disposition:    Marcelino Score:  Marcelino Scale Score: 22    Readmission Risk              Risk of Unplanned Readmission:  13           Discussed patient goal for the day, patient clinical progression, and barriers to discharge.   The following Goal(s) of the Day/Commitment(s) have been identified:  Labs - Report Results      Matthew Leo RN  July 31, 2023

## 2023-08-01 LAB
ALBUMIN SERPL-MCNC: 3 G/DL (ref 3.5–5.2)
ALP SERPL-CCNC: 168 U/L (ref 35–104)
ALT SERPL-CCNC: 28 U/L (ref 0–32)
ANION GAP SERPL CALCULATED.3IONS-SCNC: 8 MMOL/L (ref 7–16)
APPEARANCE BRONCH: ABNORMAL
AST SERPL-CCNC: 24 U/L (ref 0–31)
BILIRUB SERPL-MCNC: 0.5 MG/DL (ref 0–1.2)
BUN SERPL-MCNC: 15 MG/DL (ref 6–23)
CALCIUM SERPL-MCNC: 8.6 MG/DL (ref 8.6–10.2)
CHLORIDE SERPL-SCNC: 114 MMOL/L (ref 98–107)
CLOT CHECK: ABNORMAL
CO2 SERPL-SCNC: 23 MMOL/L (ref 22–29)
COLOR BRONCH: COLORLESS
CREAT SERPL-MCNC: 1.1 MG/DL (ref 0.5–1)
DATE LAST DOSE: ABNORMAL
ERYTHROCYTE [DISTWIDTH] IN BLOOD BY AUTOMATED COUNT: 15.1 % (ref 11.5–15)
GFR SERPL CREATININE-BSD FRML MDRD: 52 ML/MIN/1.73M2
GLUCOSE SERPL-MCNC: 94 MG/DL (ref 74–99)
HCT VFR BLD AUTO: 28.5 % (ref 34–48)
HGB BLD-MCNC: 8.3 G/DL (ref 11.5–15.5)
LYMPHOCYTES, BAL: 10 % (ref 8–12)
MACROPHAGES, BAL: 19 % (ref 85–95)
MCH RBC QN AUTO: 26.5 PG (ref 26–35)
MCHC RBC AUTO-ENTMCNC: 29.1 G/DL (ref 32–34.5)
MCV RBC AUTO: 91.1 FL (ref 80–99.9)
MICROORGANISM SPEC CULT: NORMAL
MICROORGANISM/AGENT SPEC: NORMAL
PLATELET # BLD AUTO: 84 K/UL (ref 130–450)
PLATELET CONFIRMATION: NORMAL
PMV BLD AUTO: 11.6 FL (ref 7–12)
POTASSIUM SERPL-SCNC: 3.4 MMOL/L (ref 3.5–5)
PROT SERPL-MCNC: 5.9 G/DL (ref 6.4–8.3)
RBC # BLD AUTO: 3.13 M/UL (ref 3.5–5.5)
RBC, BAL: 85 CELLS/UL
SEGMENTED NEUTROPHILS, BAL: 71 % (ref 0–10)
SERVICE CMNT-IMP: NORMAL
SODIUM SERPL-SCNC: 145 MMOL/L (ref 132–146)
SPECIMEN DESCRIPTION: NORMAL
SPECIMEN TYPE: ABNORMAL
TME LAST DOSE: ABNORMAL H
TOTAL CELLS COUNTED BRONCH: 183 CELLS/UL
VANCOMYCIN DOSE: ABNORMAL MG
VANCOMYCIN TROUGH SERPL-MCNC: 17.1 UG/ML (ref 5–16)
WBC OTHER # BLD: 4.5 K/UL (ref 4.5–11.5)

## 2023-08-01 PROCEDURE — 36415 COLL VENOUS BLD VENIPUNCTURE: CPT

## 2023-08-01 PROCEDURE — 85027 COMPLETE CBC AUTOMATED: CPT

## 2023-08-01 PROCEDURE — 2580000003 HC RX 258: Performed by: INTERNAL MEDICINE

## 2023-08-01 PROCEDURE — 80053 COMPREHEN METABOLIC PANEL: CPT

## 2023-08-01 PROCEDURE — 6370000000 HC RX 637 (ALT 250 FOR IP): Performed by: INTERNAL MEDICINE

## 2023-08-01 PROCEDURE — 6360000002 HC RX W HCPCS: Performed by: INTERNAL MEDICINE

## 2023-08-01 PROCEDURE — 80202 ASSAY OF VANCOMYCIN: CPT

## 2023-08-01 PROCEDURE — 2140000000 HC CCU INTERMEDIATE R&B

## 2023-08-01 RX ORDER — POTASSIUM CHLORIDE 20 MEQ/1
20 TABLET, EXTENDED RELEASE ORAL ONCE
Status: COMPLETED | OUTPATIENT
Start: 2023-08-01 | End: 2023-08-01

## 2023-08-01 RX ADMIN — VANCOMYCIN HYDROCHLORIDE 1250 MG: 10 INJECTION, POWDER, LYOPHILIZED, FOR SOLUTION INTRAVENOUS at 14:49

## 2023-08-01 RX ADMIN — METOPROLOL SUCCINATE 50 MG: 50 TABLET, EXTENDED RELEASE ORAL at 08:10

## 2023-08-01 RX ADMIN — CEFEPIME 2000 MG: 2 INJECTION, POWDER, FOR SOLUTION INTRAVENOUS at 18:08

## 2023-08-01 RX ADMIN — FOLIC ACID 1 MG: 1 TABLET ORAL at 08:10

## 2023-08-01 RX ADMIN — FERROUS SULFATE TAB 325 MG (65 MG ELEMENTAL FE) 325 MG: 325 (65 FE) TAB at 08:10

## 2023-08-01 RX ADMIN — ROSUVASTATIN CALCIUM 10 MG: 20 TABLET, FILM COATED ORAL at 20:09

## 2023-08-01 RX ADMIN — CEFEPIME 2000 MG: 2 INJECTION, POWDER, FOR SOLUTION INTRAVENOUS at 03:06

## 2023-08-01 RX ADMIN — POTASSIUM CHLORIDE 20 MEQ: 1500 TABLET, EXTENDED RELEASE ORAL at 18:05

## 2023-08-01 ASSESSMENT — PAIN SCALES - GENERAL
PAINLEVEL_OUTOF10: 0

## 2023-08-01 NOTE — CARE COORDINATION
8/1/23  Transition of Care Update. Patient admitted for JUNIE and right sided loculated pleural effusion. ID is following with IV Cefepime and vancomycin to continue empirically. Patient is s/p Bronch from 7/31 with no endobronchial anomalies noted during bronchoscopy. A referral was made to [facility] for IV ATB if needed post discharge. Velma Harden is Friday 8/4 but patient can be moved to a flex spot if discharge is sooner than Friday. Dara Sellers from [facility] did reach out to this  to update that she was reaching out to [employer] as patient is not in network with insurance who will run benefits. Discharge goal is home when medically stable. SW/CM to follow.     Electronically signed by JL Diaz on 8/1/2023 at 12:31 PM

## 2023-08-01 NOTE — PROGRESS NOTES
P Quality Flow/Interdisciplinary Rounds Progress Note        Quality Flow Rounds held on August 1, 2023    Disciplines Attending:  Bedside Nurse, , , and Nursing Unit Leadership    Dru Toney was admitted on 7/26/2023  4:55 PM    Anticipated Discharge Date:       Disposition:    Marcelino Score:  Marcelino Scale Score: 22    Readmission Risk              Risk of Unplanned Readmission:  11           Discussed patient goal for the day, patient clinical progression, and barriers to discharge.   The following Goal(s) of the Day/Commitment(s) have been identified:   pain control      Feng Urena RN  August 1, 2023

## 2023-08-01 NOTE — PROGRESS NOTES
Pharmacy Consultation Note  (Antibiotic Dosing and Monitoring)    Initial consult date: 7/27/23  Consulting physician/provider: Dr. Becki Escamilla  Drug: Vancomycin  Indication: HAP; 14 days    Age/  Gender Height Weight IBW  Allergy Information   77 y.o./female 5' 6.5\" (168.9 cm) 154 lb (69.9 kg)     Ideal body weight: 60.5 kg (133 lb 4.3 oz)  Adjusted ideal body weight: 64.2 kg (141 lb 9 oz)   Plavix [clopidogrel] and Ace inhibitors      Renal Function:  Recent Labs     08/01/23  0513   BUN 15   CREATININE 1.1*         Intake/Output Summary (Last 24 hours) at 8/1/2023 1435  Last data filed at 8/1/2023 1321  Gross per 24 hour   Intake 180 ml   Output 1100 ml   Net -920 ml         Vancomycin Monitoring:  Trough:    Recent Labs     08/01/23  1143   VANCOTROUGH 17.1*     Random:  No results for input(s): VANCORANDOM in the last 72 hours. No results for input(s): Haydee Estrada in the last 72 hours. Historical Cultures:  Organism   Date Value Ref Range Status   06/02/2022 Streptococcus pneumoniae (A)  Final     No results for input(s): BC in the last 72 hours. Vancomycin Administration Times:    Recent vancomycin administrations                     vancomycin (VANCOCIN) 1,250 mg in sodium chloride 0.9 % 250 mL IVPB (mg) 1,250 mg New Bag 07/31/23 1231     1,250 mg New Bag 07/30/23 0804                  Assessment:  Patient is a 78 y.o. female who has been initiated on vancomycin  Estimated Creatinine Clearance: 42 mL/min (A) (based on SCr of 1.1 mg/dL (H)).   8/1: Vancomycin trough this therapeutic at 17.1 mcg/mL, Scr 1.1     Plan:  Continue vancomycin 1250 mg IV q24h  Vancomycin levels will be collected as needed to appropriately monitor     Thank you for the consult,     Twila Ramos, PharmD, BCPS, BCCCP 8/1/2023 2:35 PM  Phone: [phone]

## 2023-08-02 LAB — NON-GYN CYTOLOGY REPORT: NORMAL

## 2023-08-02 PROCEDURE — 2580000003 HC RX 258: Performed by: INTERNAL MEDICINE

## 2023-08-02 PROCEDURE — 2140000000 HC CCU INTERMEDIATE R&B

## 2023-08-02 PROCEDURE — 6360000002 HC RX W HCPCS: Performed by: INTERNAL MEDICINE

## 2023-08-02 PROCEDURE — 6370000000 HC RX 637 (ALT 250 FOR IP): Performed by: INTERNAL MEDICINE

## 2023-08-02 RX ORDER — LACTOBACILLUS RHAMNOSUS GG 10B CELL
1 CAPSULE ORAL DAILY
Status: DISCONTINUED | OUTPATIENT
Start: 2023-08-02 | End: 2023-08-03 | Stop reason: HOSPADM

## 2023-08-02 RX ADMIN — LEFLUNOMIDE 20 MG: 10 TABLET ORAL at 09:04

## 2023-08-02 RX ADMIN — Medication 1 CAPSULE: at 17:17

## 2023-08-02 RX ADMIN — CEFEPIME 2000 MG: 2 INJECTION, POWDER, FOR SOLUTION INTRAVENOUS at 03:01

## 2023-08-02 RX ADMIN — CEFEPIME 2000 MG: 2 INJECTION, POWDER, FOR SOLUTION INTRAVENOUS at 14:38

## 2023-08-02 RX ADMIN — METOPROLOL SUCCINATE 50 MG: 50 TABLET, EXTENDED RELEASE ORAL at 09:04

## 2023-08-02 RX ADMIN — LEVOTHYROXINE SODIUM 25 MCG: 0.03 TABLET ORAL at 06:18

## 2023-08-02 RX ADMIN — ROSUVASTATIN CALCIUM 10 MG: 20 TABLET, FILM COATED ORAL at 20:49

## 2023-08-02 RX ADMIN — FERROUS SULFATE TAB 325 MG (65 MG ELEMENTAL FE) 325 MG: 325 (65 FE) TAB at 09:04

## 2023-08-02 RX ADMIN — FOLIC ACID 1 MG: 1 TABLET ORAL at 09:04

## 2023-08-02 ASSESSMENT — PAIN SCALES - GENERAL
PAINLEVEL_OUTOF10: 0

## 2023-08-02 NOTE — PROGRESS NOTES
Pharmacy Consultation Note  (Antibiotic Dosing and Monitoring)    Initial consult date: 7/27/23  Consulting physician/provider: Dr. Olivia Dominguez  Drug: Vancomycin  Indication: HAP; 14 days    Age/  Gender Height Weight IBW  Allergy Information   77 y.o./female 5' 6.5\" (168.9 cm) 154 lb (69.9 kg)     Ideal body weight: 60.5 kg (133 lb 4.3 oz)  Adjusted ideal body weight: 64.2 kg (141 lb 9 oz)   Plavix [clopidogrel] and Ace inhibitors      Renal Function:  Recent Labs     08/01/23  0513   BUN 15   CREATININE 1.1*         Intake/Output Summary (Last 24 hours) at 8/2/2023 1239  Last data filed at 8/1/2023 2315  Gross per 24 hour   Intake 240 ml   Output 900 ml   Net -660 ml         Vancomycin Monitoring:  Trough:    Recent Labs     08/01/23  1143   VANCOTROUGH 17.1*       Random:  No results for input(s): VANCORANDOM in the last 72 hours. No results for input(s): Marcihuy Leroy in the last 72 hours. Historical Cultures:  Organism   Date Value Ref Range Status   06/02/2022 Streptococcus pneumoniae (A)  Final     No results for input(s): BC in the last 72 hours. Vancomycin Administration Times:    Recent vancomycin administrations                     vancomycin (VANCOCIN) 1,250 mg in sodium chloride 0.9 % 250 mL IVPB (mg) 1,250 mg New Bag 08/01/23 1449     1,250 mg New Bag 07/31/23 1231                  Assessment:  Patient is a 78 y.o. female who has been initiated on vancomycin  Estimated Creatinine Clearance: 42 mL/min (A) (based on SCr of 1.1 mg/dL (H)).   8/1: Vancomycin trough this therapeutic at 17.1 mcg/mL, Scr 1.1   8/2: UOP 0.5 mL/kg/hr, No updated labs today    Plan:  Continue vancomycin 1250 mg IV q24h  Vancomycin levels will be collected as needed to appropriately monitor     Thank you for the consult,     Marino Weaver, PharmD, BCPS, BCCCP 8/2/2023 12:39 PM  Phone: [phone]    ADDENDUM:    Vancomycin has been discontinued   Clinical Pharmacy to sign-off  Physician to re-consult pharmacy if future dosing is needed    Thank you for the consult,  Dominick Betts, PharmD, BCPS, BCCCP 8/2/2023 2:41 PM

## 2023-08-02 NOTE — PATIENT CARE CONFERENCE
P Quality Flow/Interdisciplinary Rounds Progress Note        Quality Flow Rounds held on August 2, 2023    Disciplines Attending:  Bedside Nurse, , , and Nursing Unit Leadership    Abigail Peng was admitted on 7/26/2023  4:55 PM    Anticipated Discharge Date:       Disposition:    Marcelino Score:  Marcelino Scale Score: 22    Readmission Risk              Risk of Unplanned Readmission:  12           Discussed patient goal for the day, patient clinical progression, and barriers to discharge.   The following Goal(s) of the Day/Commitment(s) have been identified:  discharge [address] Jessica Greene RN  August 2, 2023

## 2023-08-02 NOTE — PROGRESS NOTES
[address] [facility]  [facility]  Progress note       HISTORY OF PRESENT ILLNESS:   57-year-old female with past medical history of CAD, colon polyps, osteoarthritis, thyroid disease presented with right-sided chest pain. She was admitted in January 2022 when was found to have right lower lobe cavitary pneumonia with respiratory cultures growing Streptococcus pneumoniae. She was found to have right-sided moderate pleural effusion which is loculated. She also has a recent history of pulmonary aspergillosis and was treated for that. ID is consulted for right-sided loculated pleural effusion. 7/30: In bed, doing well, in no apparent distress.   She says she feels cold, but doing okay  Afebrile     Past Medical History:        Diagnosis Date    CAD (coronary artery disease)     Colon polyps     Osteoarthritis     Thyroid disease      Past Surgical History:        Procedure Laterality Date    ABDOMEN SURGERY      BRONCHOSCOPY N/A 6/2/2022    BRONCHOSCOPY ALVEOLAR LAVAGE performed by Marija Villa MD at [address] [place] [address] N/A 6/2/2022    BRONCHOSCOPY DIAGNOSTIC OR CELL [address] ONLY performed by Marija Villa MD at [address] [place] [address] N/A 6/2/2022    BRONCHOSCOPY BIOPSY BRONCHUS performed by Marija Villa MD at [address] [facility]      COLONOSCOPY N/A 7/23/2020    COLORECTAL CANCER SCREENING, NOT HIGH RISK performed by Trace Agosto MD at [address] [facility]  06/03/2016    Dr. Bartlett Has - 3.5x38 Raad Soho ROSAMARIA to Prox RCA     Current Medications:   Scheduled Meds:   cefepime  2,000 mg IntraVENous Q12H    levothyroxine  25 mcg Oral Every Other Day    ferrous sulfate  325 mg Oral Daily with breakfast    [START ON 8/6/2023] vitamin D  50,000 Units Oral Q14 Days    metoprolol succinate  50 mg Oral Daily    rosuvastatin  10 mg Oral Nightly    losartan  50 mg Oral QPM    leflunomide  20 mg Oral LP1UAG in the last 72 hours. No results for input(s): ASO in the last 72 hours. No results for input(s): CULTRESP in the last 72 hours.     Assessment:  Right-sided loculated pleural effusion  Patient reports recent history of pulmonary aspergillosis treated outpatient    Plan:    Status post bronch and BAL 07/31  Cefepime and vancomycin to continue empirically  We will follow pleural fluid analysis and culture and cytology  Elevated CRP noted  Blood cultures no growth so far  Respiratory cultures no growth so far  We will follow BAL cultures  ID will continue to follow    Electronically signed by Miquel Berrios MD on 8/2/2023 at 3:13 PM

## 2023-08-02 NOTE — CARE COORDINATION
8/2/23  Transition of care update. Patient admitted for JUNIE and right sided loculated pleural effusion. ID is following with IV Cefepime and vancomycin in place empirically. Patient is s/p Bronch from 7/31 with no endobronchial anomalies noted during bronchoscopy. Blood and respiratory cultures show no growth so far. BAL cultures pending. Patient is on room air up and ambulating the unit. Patient is being followed by [facility] home care should IV ATB be required post discharge with [facility] Friday 8/4/23. Wood Narvaez from [facility] did reach out to this  to update that she was reaching out to [facility] as patient is not in network with insurance who will run benefits. Call placed to [facility] at [facility] to follow up and awaiting return call. Discharge goal is home when medically stable. SW/CM to follow. 1:30pm  Spoke with [facility] from [facility]. [facility] has run patients insurance coverage for IV ATB. Patient will be responsible for $100 per week for each ATB (Vanc and Cefepime). [facility] spoke with patient who is agreeable to pay cost if home ATB are indicated post discharge.       Electronically signed by JL Holly on 8/2/2023 at 11:07 AM

## 2023-08-03 VITALS
BODY MASS INDEX: 24.75 KG/M2 | TEMPERATURE: 97.6 F | HEIGHT: 66 IN | OXYGEN SATURATION: 95 % | RESPIRATION RATE: 20 BRPM | DIASTOLIC BLOOD PRESSURE: 57 MMHG | WEIGHT: 154 LBS | HEART RATE: 84 BPM | SYSTOLIC BLOOD PRESSURE: 113 MMHG

## 2023-08-03 LAB — METER GLUCOSE: 100 MG/DL (ref 74–99)

## 2023-08-03 PROCEDURE — 82947 ASSAY GLUCOSE BLOOD QUANT: CPT

## 2023-08-03 PROCEDURE — 6370000000 HC RX 637 (ALT 250 FOR IP): Performed by: INTERNAL MEDICINE

## 2023-08-03 PROCEDURE — 6360000002 HC RX W HCPCS: Performed by: INTERNAL MEDICINE

## 2023-08-03 PROCEDURE — 2580000003 HC RX 258: Performed by: INTERNAL MEDICINE

## 2023-08-03 RX ORDER — LEVOFLOXACIN 750 MG/1
750 TABLET ORAL EVERY OTHER DAY
Status: DISCONTINUED | OUTPATIENT
Start: 2023-08-03 | End: 2023-08-03 | Stop reason: HOSPADM

## 2023-08-03 RX ORDER — LEVOFLOXACIN 750 MG/1
750 TABLET ORAL DAILY
Status: DISCONTINUED | OUTPATIENT
Start: 2023-08-03 | End: 2023-08-03

## 2023-08-03 RX ORDER — LEVOFLOXACIN 750 MG/1
750 TABLET ORAL EVERY OTHER DAY
Qty: 5 TABLET | Refills: 0 | Status: SHIPPED | OUTPATIENT
Start: 2023-08-03 | End: 2023-08-13

## 2023-08-03 RX ADMIN — FOLIC ACID 1 MG: 1 TABLET ORAL at 08:26

## 2023-08-03 RX ADMIN — FERROUS SULFATE TAB 325 MG (65 MG ELEMENTAL FE) 325 MG: 325 (65 FE) TAB at 08:26

## 2023-08-03 RX ADMIN — METOPROLOL SUCCINATE 50 MG: 50 TABLET, EXTENDED RELEASE ORAL at 08:26

## 2023-08-03 RX ADMIN — LEVOFLOXACIN 750 MG: 750 TABLET, FILM COATED ORAL at 14:44

## 2023-08-03 RX ADMIN — Medication 1 CAPSULE: at 08:26

## 2023-08-03 RX ADMIN — CEFEPIME 2000 MG: 2 INJECTION, POWDER, FOR SOLUTION INTRAVENOUS at 03:38

## 2023-08-03 ASSESSMENT — PAIN SCALES - GENERAL: PAINLEVEL_OUTOF10: 0

## 2023-08-03 NOTE — PROGRESS NOTES
Comprehensive Nutrition Assessment    Type and Reason for Visit:  Initial (LOS)    Nutrition Recommendations/Plan:   Recommend and start Ensure plus high protein supplement daily and Magic Cup supplement daily to help meet nutritional needs. Malnutrition Assessment:  Malnutrition Status: At risk for malnutrition (Comment) (08/03/23 1103)    Context:  Acute Illness     Findings of the 6 clinical characteristics of malnutrition:  Energy Intake:  75% or less of estimated energy requirements for 7 or more days  Weight Loss:  Unable to assess (d/t no actual weights available since admission)     Body Fat Loss:  Unable to assess     Muscle Mass Loss:  Unable to assess    Fluid Accumulation:  No significant fluid accumulation     Strength:  Not Performed    Nutrition Assessment:    Patient adm w/ chest pain ; noted pleural effusion ; noted right thoracentesis not completed as not enough fluid to tap on 7/28 ; noted JUNIE likely d/t dehydration ; noted lung lesions (history of aspergilloma)  ; s/p bronchoscopy on 7/31 ; hx of CAD and RA ; will start ONS    Nutrition Related Findings:    -I&Os (-5.5 L), no edema, A&O x 4, missing teeth, active BS, loose stools, rounded abd ; Wound Type: None       Current Nutrition Intake & Therapies:    Average Meal Intake: 51-75%     ADULT DIET; Regular    Anthropometric Measures:  Height: 5' 6\" (167.6 cm)  Ideal Body Weight (IBW): 130 lbs (59 kg)       Current Body Weight: 154 lb (69.9 kg) (7/26, stated), 118.5 % IBW.  Weight Source: Stated  Current BMI (kg/m2): 24.9  Usual Body Weight:  (UTO ; EMR shows past weight of 164# actual on 9/29/22)                       BMI Categories: Normal Weight (BMI 22.0 to 24.9) age over 72    Estimated Daily Nutrient Needs:  Energy Requirements Based On: Formula  Weight Used for Energy Requirements: Current  Energy (kcal/day): 7443-3113 (REE 1212 x 1.1-1.2 SF)  Weight Used for Protein Requirements: Current  Protein (g/day): 80-90 (1.2-1.3g/kg CBW)  Method Used for Fluid Requirements: 1 ml/kcal  Fluid (ml/day): 5143-4067    Nutrition Diagnosis:   Inadequate oral intake related to inadequate protein-energy intake (adm w/ pleural effusion) as evidenced by poor intake prior to admission, intake 51-75%    Nutrition Interventions:   Food and/or Nutrient Delivery: Continue Current Diet, Start Oral Nutrition Supplement  Nutrition Education/Counseling: Education not indicated  Coordination of Nutrition Care: Continue to monitor while inpatient       Goals:  Previous Goal Met: Progressing toward Goal(s)  Goals: Meet at least 75% of estimated needs, by next RD assessment       Nutrition Monitoring and Evaluation:   Behavioral-Environmental Outcomes: None Identified  Food/Nutrient Intake Outcomes: Supplement Intake, Food and Nutrient Intake  Physical Signs/Symptoms Outcomes: Biochemical Data, Chewing or Swallowing, GI Status, Fluid Status or Edema, Hemodynamic Status, Meal Time Behavior, Diarrhea, Nutrition Focused Physical Findings, Skin, Weight    Discharge Planning:     Too soon to determine     Esther Public, RD, LD  Contact: [phone]

## 2023-08-03 NOTE — PLAN OF CARE
Problem: Discharge Planning  Goal: Discharge to home or other facility with appropriate resources  8/3/2023 0934 by Nidia Isaac RN  Outcome: Progressing  8/3/2023 0552 by Constance Hein RN  Outcome: Adequate for Discharge  8/3/2023 8007 by Constance Hein RN  Outcome: Progressing  Flowsheets (Taken 8/2/2023 2000)  Discharge to home or other facility with appropriate resources: Identify barriers to discharge with patient and caregiver     Problem: ABCDS Injury Assessment  Goal: Absence of physical injury  8/3/2023 0934 by Nidia Isaac RN  Outcome: Progressing  8/3/2023 0552 by Constance eHin RN  Outcome: Adequate for Discharge  8/3/2023 0747 by Constance Hein RN  Outcome: Progressing  Flowsheets  Taken 8/3/2023 0235  Absence of Physical Injury: Implement safety measures based on patient assessment  Taken 8/2/2023 2000  Absence of Physical Injury: Implement safety measures based on patient assessment     Problem: Safety - Adult  Goal: Free from fall injury  8/3/2023 0934 by Nidia Isaac RN  Outcome: Progressing  8/3/2023 0552 by Constance Hein RN  Outcome: Adequate for Discharge  8/3/2023 9806 by Constance Hein RN  Outcome: Progressing  Flowsheets  Taken 8/3/2023 0235  Free From Fall Injury: Instruct family/caregiver on patient safety  Taken 8/2/2023 2000  Free From Fall Injury: Instruct family/caregiver on patient safety     Problem: Pain  Goal: Verbalizes/displays adequate comfort level or baseline comfort level  8/3/2023 0934 by Nidia Isaac RN  Outcome: Progressing  8/3/2023 0552 by Constance Hein RN  Outcome: Adequate for Discharge  8/3/2023 0433 by Constance Hein RN  Outcome: Progressing

## 2023-08-03 NOTE — PLAN OF CARE
Problem: Discharge Planning  Goal: Discharge to home or other facility with appropriate resources  8/3/2023 1521 by Justice Fabry, RN  Outcome: Adequate for Discharge  8/3/2023 0934 by Justice Fabry, RN  Outcome: Progressing  8/3/2023 0552 by Freddy Murray RN  Outcome: Adequate for Discharge  8/3/2023 4968 by Freddy Murray RN  Outcome: Progressing  Flowsheets (Taken 8/2/2023 2000)  Discharge to home or other facility with appropriate resources: Identify barriers to discharge with patient and caregiver     Problem: ABCDS Injury Assessment  Goal: Absence of physical injury  8/3/2023 1521 by Justice Fabry, RN  Outcome: Adequate for Discharge  8/3/2023 0934 by Justice Fabry, RN  Outcome: Progressing  8/3/2023 0552 by Freddy Murray RN  Outcome: Adequate for Discharge  8/3/2023 6572 by Freddy Murray RN  Outcome: Progressing  Flowsheets  Taken 8/3/2023 0235  Absence of Physical Injury: Implement safety measures based on patient assessment  Taken 8/2/2023 2000  Absence of Physical Injury: Implement safety measures based on patient assessment     Problem: Safety - Adult  Goal: Free from fall injury  8/3/2023 1521 by Justice Fabry, RN  Outcome: Adequate for Discharge  8/3/2023 0934 by Justice Fabry, RN  Outcome: Progressing  8/3/2023 0552 by Freddy Murray RN  Outcome: Adequate for Discharge  8/3/2023 4134 by Freddy Murray RN  Outcome: Progressing  Flowsheets  Taken 8/3/2023 0235  Free From Fall Injury: Instruct family/caregiver on patient safety  Taken 8/2/2023 2000  Free From Fall Injury: Instruct family/caregiver on patient safety     Problem: Pain  Goal: Verbalizes/displays adequate comfort level or baseline comfort level  8/3/2023 1521 by Justice Fabry, RN  Outcome: Adequate for Discharge  8/3/2023 0934 by Justice Fabry, RN  Outcome: Progressing  8/3/2023 0552 by Freddy Murray RN  Outcome: Adequate for Discharge  8/3/2023 0433 by Freddy Murray RN  Outcome: Progressing     Problem: Nutrition Deficit:  Goal: Optimize nutritional status  Outcome: Adequate for Discharge

## 2023-08-03 NOTE — PATIENT CARE CONFERENCE
P Quality Flow/Interdisciplinary Rounds Progress Note        Quality Flow Rounds held on August 3, 2023    Disciplines Attending:  Bedside Nurse, , , and Nursing Unit Leadership    Janis Pinzon was admitted on 7/26/2023  4:55 PM    Anticipated Discharge Date:       Disposition:    Marcelino Score:  Marcelino Scale Score: 22    Readmission Risk              Risk of Unplanned Readmission:  12           Discussed patient goal for the day, patient clinical progression, and barriers to discharge.   The following Goal(s) of the Day/Commitment(s) have been identified:  discharge [address] Jessica Greene RN  August 3, 2023

## 2023-08-03 NOTE — PLAN OF CARE
Problem: Discharge Planning  Goal: Discharge to home or other facility with appropriate resources  8/3/2023 0552 by Meggan Stone RN  Outcome: Adequate for Discharge  8/3/2023 0433 by Meggan Stone RN  Outcome: Progressing  Flowsheets (Taken 8/2/2023 2000)  Discharge to home or other facility with appropriate resources: Identify barriers to discharge with patient and caregiver  8/2/2023 1606 by Camila Osorio RN  Outcome: Progressing  Flowsheets (Taken 8/2/2023 0800)  Discharge to home or other facility with appropriate resources: Refer to discharge planning if patient needs post-hospital services based on physician order or complex needs related to functional status, cognitive ability or social support system     Problem: ABCDS Injury Assessment  Goal: Absence of physical injury  8/3/2023 0552 by Meggan Stone RN  Outcome: Adequate for Discharge  8/3/2023 0433 by Meggan Stone RN  Outcome: Progressing  Flowsheets  Taken 8/3/2023 0235  Absence of Physical Injury: Implement safety measures based on patient assessment  Taken 8/2/2023 2000  Absence of Physical Injury: Implement safety measures based on patient assessment  8/2/2023 1606 by Camila Osorio RN  Outcome: Progressing     Problem: Safety - Adult  Goal: Free from fall injury  8/3/2023 0552 by Meggan Stone RN  Outcome: Adequate for Discharge  8/3/2023 0433 by Meggan Stone RN  Outcome: Progressing  Flowsheets  Taken 8/3/2023 0235  Free From Fall Injury: Instruct family/caregiver on patient safety  Taken 8/2/2023 2000  Free From Fall Injury: Instruct family/caregiver on patient safety  8/2/2023 1606 by Camila Osorio RN  Outcome: Progressing     Problem: Pain  Goal: Verbalizes/displays adequate comfort level or baseline comfort level  8/3/2023 0552 by Meggan Stone RN  Outcome: Adequate for Discharge  8/3/2023 0433 by Meggan Stone RN  Outcome: Progressing  8/2/2023 1606 by Camila Osorio RN  Outcome: Progressing

## 2023-08-03 NOTE — PROGRESS NOTES
480 [P.O.:480]  Out: 200 [Urine:200]  No intake/output data recorded. Results:  CBC:   Recent Labs     08/01/23  0513   WBC 4.5   HGB 8.3*   HCT 28.5*   MCV 91.1   PLT 84*     BMP:   Recent Labs     08/01/23  0513      K 3.4*   *   CO2 23   BUN 15   CREATININE 1.1*     LFT:   Recent Labs     08/01/23  0513   ALKPHOS 168*   ALT 28   AST 24   PROT 5.9*   BILITOT 0.5   LABALBU 3.0*       PT/INR:   No results for input(s): PROTIME, INR in the last 72 hours. Procalcitonin:   No results for input(s): PROCAL in the last 72 hours. Cultures:  No results for input(s): CULTRESP in the last 72 hours. ABG:   No results for input(s): PH, PO2, PCO2, HCO3, BE, O2SAT in the last 72 hours. Films:  CT ABDOMEN PELVIS WO CONTRAST Additional Contrast? None  Result Date: 7/26/2023  1. No bowel obstruction, free air, or free fluid. 2. Hazy opacities are seen along margins of urinary bladder which may indicate cystitis. Clinical correlation recommended. 3. Cholelithiasis 4. Nonobstructing bilateral renal calculi. 5. Bilateral perinephric fat stranding could indicate chronic medical renal disease with appropriate clinical history. 6. View lung bases shows a right pleural effusion with adjacent irregular opacities as well as cavitary nodule located in right lower lobe. Additional nodular opacities are present in left lower lobe. Please refer to report from CT chest performed on the same date. CT CHEST WO CONTRAST 7/26/2023  1. Interval development of a small/moderate loculated right pleural effusion with some loculated fluid in the major fissure. . 2. Otherwise similar findings as seen previously.                  Constitutional:  65-year-old female, nonsmoker, known to [facility] Dr. Leah Marquez, with past medical history of CAD s/p stent, rheumatoid arthritis, thyroid disease, colon polyps, COVID-19 infection 1/27/2022, fungal cavities/ aspergilloma       7/26 presents to [facility] ED for right-sided chest pain, cough, runny nose for 3 days   Labs significant for creatinine 1.8, hemoglobin 10.4, platelets 994, alk phos 229  Rapid influenza A/B negative  COVID-negative  CT chest interval development of a small/moderate loculated right pleural effusion with some moderate fluid in the major fissure. Otherwise similar findings as seen previously     7/27 pulmonary consulted for right loculated pleural effusion on room air saturating 95%  7/28 room air saturating 97%, thoracentesis not completed as not enough fluid to tap  7/29 room air 95%  7/30 room air 97%  7/31 s/p bronchoscopy    8/1  room air 92%  8/2  room air 93%  8/3: room air, 98%       Assessment/Plan:  Right loculated pleural effusion  Remains on room air  Supplemental oxygen as needed to maintain pulse ox greater than 90%  7/28 right thoracentesis not completed as not enough fluid to tap  Cavitary lung lesions  History of aspergilloma   s/p treatment with Eraxis x 28 days per ID 3/2022  On Cefepime day 8 per ID. S/p vancomycin 8/2 (pharm dosing)   7/31 S/p bronchoscopy with RLL BAL (71% Neutrophils, 10% Lymphocytes) - culture negative, fungal and cytology pending   JUNIE likely secondary to dehydration   Anemia   Rheumatoid arthritis   CAD  HTN/HLD  DVT/PE prophylaxis with SCDs       Patient is stable from a pulmonary standpoint. Patient to keep follow up in our office on 8/15/2023      LESTER Ha - CNP  8/3/2023  9:05 AM    Attending Attestation Note:    Patient seen and examined with Hospital Staff, NP. I have extensively reviewed the chart lab work and imaging. I agree with above. Modifications and amendment of note made as necessary.     In addition, the following apply:    Current Facility-Administered Medications   Medication Dose Route Frequency Provider Last Rate Last Admin    lactobacillus (CULTURELLE) capsule 1 capsule  1 capsule Oral Daily Mal Singh MD   1 capsule at 08/03/23 0826    ceFEPIme (MAXIPIME) 2,000 mg in sodium chloride 0.9 % 50 mL IVPB (Voni6Hdg)  2,000 mg IntraVENous Q12H Luis Salvador MD   Stopped at 08/03/23 0824    levothyroxine (SYNTHROID) tablet 25 mcg  25 mcg Oral Every Other Day Jami Brsawell MD   25 mcg at 08/02/23 0618    ferrous sulfate (IRON 325) tablet 325 mg  325 mg Oral Daily with breakfast Jami Braswell MD   325 mg at 08/03/23 0826    [START ON 8/6/2023] vitamin D (ERGOCALCIFEROL) capsule 50,000 Units  50,000 Units Oral Q14 Days Jami Braswell MD        metoprolol succinate (TOPROL XL) extended release tablet 50 mg  50 mg Oral Daily Jami Braswell MD   50 mg at 08/03/23 7428    rosuvastatin (CRESTOR) tablet 10 mg  10 mg Oral Nightly Jami Braswell MD   10 mg at 08/02/23 2049    losartan (COZAAR) tablet 50 mg  50 mg Oral QPM Jami Braswell MD   50 mg at 07/30/23 1713    leflunomide (ARAVA) tablet 20 mg  20 mg Oral Once per day on Mon Wed Fri Jami Braswell MD   20 mg at 09/30/63 2666    folic acid (FOLVITE) tablet 1 mg  1 mg Oral Daily Jami Braswell MD   1 mg at 08/03/23 3614    loperamide (IMODIUM) capsule 2 mg  2 mg Oral 4x Daily PRN Jami Braswell MD          - S/P bronchoscopy with RLL BAL 7/31/2023 (71% Neutrophils, 10% Lymphocytes)  - supportive care to continue at this time  - Cefepime and vancomycin to continue empirically per ID services   - no endobronchial anomalies noted during bronchoscopy   - await D/C planning  - follow up with August 15 previous office date    Aj Will MD  8/3/2023  12:50 PM

## 2023-08-03 NOTE — CARE COORDINATION
8/3/23  Transition of Care Update. Patient admitted for JUNIE and right sided loculated pleural effusion. ID is following with IV Cefepime and vancomycin in place empirically. Patient is s/p Bronch from 7/31 with no endobronchial anomalies noted during bronchoscopy. Blood and respiratory cultures show no growth so far. BAL cultures pending. Patient is up ambulating the unit independently and remains on room air. [facility] care and infusion partners are following should IV ATB be required post discharge. Patient aware of $100 charge per each ATB (Cefepime and Vanc) weekly and agreeable to pay cost.  SW/CM awaiting ID's plan for IV ATB. Discharge goal is home when medically stable.     Electronically signed by JL Méndez on 8/3/2023 at 11:04 AM

## 2023-08-03 NOTE — PLAN OF CARE
Problem: Discharge Planning  Goal: Discharge to home or other facility with appropriate resources  8/3/2023 0433 by Constance Hein RN  Outcome: Progressing  Flowsheets (Taken 8/2/2023 2000)  Discharge to home or other facility with appropriate resources: Identify barriers to discharge with patient and caregiver  8/2/2023 1606 by Lowell High RN  Outcome: Progressing  Flowsheets (Taken 8/2/2023 0800)  Discharge to home or other facility with appropriate resources: Refer to discharge planning if patient needs post-hospital services based on physician order or complex needs related to functional status, cognitive ability or social support system     Problem: ABCDS Injury Assessment  Goal: Absence of physical injury  8/3/2023 0433 by Constance Hein RN  Outcome: Progressing  Flowsheets  Taken 8/3/2023 0235  Absence of Physical Injury: Implement safety measures based on patient assessment  Taken 8/2/2023 2000  Absence of Physical Injury: Implement safety measures based on patient assessment  8/2/2023 1606 by Lowell High RN  Outcome: Progressing     Problem: Safety - Adult  Goal: Free from fall injury  8/3/2023 0433 by Constance Hein RN  Outcome: Progressing  Flowsheets  Taken 8/3/2023 0235  Free From Fall Injury: Instruct family/caregiver on patient safety  Taken 8/2/2023 2000  Free From Fall Injury: Instruct family/caregiver on patient safety  8/2/2023 1606 by Lowell High RN  Outcome: Progressing     Problem: Pain  Goal: Verbalizes/displays adequate comfort level or baseline comfort level  8/3/2023 0433 by Constance Hein RN  Outcome: Progressing  8/2/2023 1606 by Lowell High RN  Outcome: Progressing

## 2023-08-04 LAB
MICROORGANISM SPEC CULT: NORMAL
MICROORGANISM SPEC CULT: NORMAL
MICROORGANISM/AGENT SPEC: NORMAL
MICROORGANISM/AGENT SPEC: NORMAL
SERVICE CMNT-IMP: NORMAL
SERVICE CMNT-IMP: NORMAL
SPECIMEN DESCRIPTION: NORMAL
SPECIMEN DESCRIPTION: NORMAL

## 2023-08-05 LAB
GALACTOMANNAN AG BAL QL: NEGATIVE
GALACTOMANNAN AG SPEC IA-ACNC: 0.36

## 2023-08-07 LAB
MICROORGANISM SPEC CULT: ABNORMAL
MICROORGANISM/AGENT SPEC: ABNORMAL
SERVICE CMNT-IMP: ABNORMAL
SPECIMEN DESCRIPTION: ABNORMAL

## 2023-08-09 LAB
MICROORGANISM SPEC CULT: NORMAL
MICROORGANISM/AGENT SPEC: NORMAL
SERVICE CMNT-IMP: NORMAL
SPECIMEN DESCRIPTION: NORMAL

## 2023-08-14 NOTE — PROGRESS NOTES
Physician Progress Note      Princess Alan  [facility] #:                  [id number]  :                       1945  ADMIT DATE:       2023 4:55 PM  [address] [facility] DATE:        8/3/2023 4:40 PM  RESPONDING  PROVIDER #:        Warren Merritt MD          QUERY TEXT:    Patient admitted with Right Sided Loculated Pleural Effusion. Documentation   reflect RLL Pneumonia in Bronchoscopy procedure note on 23   If possible,   please document in the progress notes and discharge summary if RLL Pneumonia   was: The medical record reflects the following:  Risk Factors: Recent treatment for pulmonary aspergillosis  Clinical Indicators:  23: pulmonary Bronchoscopy procedure note: Post op Diagnosis: RLL   Pneumonia, R/O fungal ball cavity. Chief differential diagnosis to include   fungal pneumonia VS other bacterial infection. 23: CT Chest: Interval development of a small/moderate loculated right   pleural effusion with some loculated fluid in the major fissure. Surgical Pathology: RLL BAL--Benign bronchial cells, squamous cells, pulmonary   macrophages and mixed inflammatory cells  23: Fungal Culture from BAL: Candida Albicans rare growth  23: Respiratory culture from BAL: normal respiratory giacomo  23: Order for Vancomycin: antimicrobial indications: Pneumonia (HAP)  Treatment: Pulmonary consult; Bronchoscopy with BAL and pathology; Vancomycin   IV; Cefepime IV; laboratory studies; imaging;  Respiratory culture    Thank you,  Miguel SAULN, R.N.  Clinical Documentation Improvement  [phone]  Options provided:  -- Pleural Effusion due to RLL Pneumonia confirmed after study  -- Pleural Effusion unrelated to RLL Pneumonia confirmed after Study  -- RLL Pneumonia ruled out after study  -- Other - I will add my own diagnosis  -- Disagree - Not applicable / Not valid  -- Disagree - Clinically unable to determine / Unknown  -- Refer to Clinical Documentation

## 2023-09-17 ENCOUNTER — HOSPITAL ENCOUNTER (OUTPATIENT)
Dept: CT IMAGING | Age: 78
Discharge: HOME | End: 2023-09-19
Attending: INTERNAL MEDICINE
Payer: MEDICARE

## 2023-09-17 DIAGNOSIS — B44.1 PULMONARY ASPERGILLOSIS (HCC): ICD-10-CM

## 2023-09-17 PROCEDURE — 71250 CT THORAX DX C-: CPT

## 2023-10-02 ENCOUNTER — OFFICE VISIT (OUTPATIENT)
Dept: CARDIOLOGY CLINIC | Age: 78
End: 2023-10-02
Payer: MEDICARE

## 2023-10-02 VITALS
DIASTOLIC BLOOD PRESSURE: 73 MMHG | BODY MASS INDEX: 24.56 KG/M2 | HEIGHT: 66 IN | WEIGHT: 152.8 LBS | SYSTOLIC BLOOD PRESSURE: 134 MMHG | RESPIRATION RATE: 16 BRPM | HEART RATE: 82 BPM

## 2023-10-02 DIAGNOSIS — I25.10 CORONARY ARTERY DISEASE INVOLVING NATIVE CORONARY ARTERY OF NATIVE HEART WITHOUT ANGINA PECTORIS: Primary | ICD-10-CM

## 2023-10-02 PROCEDURE — G8400 PT W/DXA NO RESULTS DOC: HCPCS | Performed by: INTERNAL MEDICINE

## 2023-10-02 PROCEDURE — 1090F PRES/ABSN URINE INCON ASSESS: CPT | Performed by: INTERNAL MEDICINE

## 2023-10-02 PROCEDURE — G8420 CALC BMI NORM PARAMETERS: HCPCS | Performed by: INTERNAL MEDICINE

## 2023-10-02 PROCEDURE — G8427 DOCREV CUR MEDS BY ELIG CLIN: HCPCS | Performed by: INTERNAL MEDICINE

## 2023-10-02 PROCEDURE — 3078F DIAST BP <80 MM HG: CPT | Performed by: INTERNAL MEDICINE

## 2023-10-02 PROCEDURE — 1123F ACP DISCUSS/DSCN MKR DOCD: CPT | Performed by: INTERNAL MEDICINE

## 2023-10-02 PROCEDURE — 3075F SYST BP GE 130 - 139MM HG: CPT | Performed by: INTERNAL MEDICINE

## 2023-10-02 PROCEDURE — G8484 FLU IMMUNIZE NO ADMIN: HCPCS | Performed by: INTERNAL MEDICINE

## 2023-10-02 PROCEDURE — 99213 OFFICE O/P EST LOW 20 MIN: CPT | Performed by: INTERNAL MEDICINE

## 2023-10-02 PROCEDURE — 93000 ELECTROCARDIOGRAM COMPLETE: CPT | Performed by: INTERNAL MEDICINE

## 2023-10-02 PROCEDURE — 1036F TOBACCO NON-USER: CPT | Performed by: INTERNAL MEDICINE

## 2023-10-26 ENCOUNTER — HOSPITAL ENCOUNTER (OUTPATIENT)
Age: 78
Discharge: HOME | End: 2023-10-26
Payer: MEDICARE

## 2023-10-26 DIAGNOSIS — B44.9 ASPERGILLOSIS (HCC): ICD-10-CM

## 2023-10-26 LAB
ALBUMIN SERPL-MCNC: 3.7 G/DL (ref 3.5–5.2)
ALP SERPL-CCNC: 100 U/L (ref 35–104)
ALT SERPL-CCNC: 25 U/L (ref 0–32)
ANION GAP SERPL CALCULATED.3IONS-SCNC: 12 MMOL/L (ref 7–16)
AST SERPL-CCNC: 25 U/L (ref 0–31)
BASOPHILS # BLD: 0.02 K/UL (ref 0–0.2)
BASOPHILS NFR BLD: 1 % (ref 0–2)
BILIRUB SERPL-MCNC: 0.6 MG/DL (ref 0–1.2)
BUN SERPL-MCNC: 24 MG/DL (ref 6–23)
CALCIUM SERPL-MCNC: 9.5 MG/DL (ref 8.6–10.2)
CHLORIDE SERPL-SCNC: 107 MMOL/L (ref 98–107)
CO2 SERPL-SCNC: 21 MMOL/L (ref 22–29)
CREAT SERPL-MCNC: 0.9 MG/DL (ref 0.5–1)
EOSINOPHIL # BLD: 0.11 K/UL (ref 0.05–0.5)
EOSINOPHILS RELATIVE PERCENT: 3 % (ref 0–6)
ERYTHROCYTE [DISTWIDTH] IN BLOOD BY AUTOMATED COUNT: 15 % (ref 11.5–15)
ERYTHROCYTE [SEDIMENTATION RATE] IN BLOOD BY WESTERGREN METHOD: 68 MM/HR (ref 0–20)
GFR SERPL CREATININE-BSD FRML MDRD: >60 ML/MIN/1.73M2
GLUCOSE SERPL-MCNC: 91 MG/DL (ref 74–99)
HCT VFR BLD AUTO: 36.1 % (ref 34–48)
HGB BLD-MCNC: 11 G/DL (ref 11.5–15.5)
IMM GRANULOCYTES # BLD AUTO: <0.03 K/UL (ref 0–0.58)
IMM GRANULOCYTES NFR BLD: 1 % (ref 0–5)
LYMPHOCYTES NFR BLD: 0.62 K/UL (ref 1.5–4)
LYMPHOCYTES RELATIVE PERCENT: 14 % (ref 20–42)
MCH RBC QN AUTO: 26.2 PG (ref 26–35)
MCHC RBC AUTO-ENTMCNC: 30.5 G/DL (ref 32–34.5)
MCV RBC AUTO: 86 FL (ref 80–99.9)
MONOCYTES NFR BLD: 0.41 K/UL (ref 0.1–0.95)
MONOCYTES NFR BLD: 9 % (ref 2–12)
NEUTROPHILS NFR BLD: 73 % (ref 43–80)
NEUTS SEG NFR BLD: 3.25 K/UL (ref 1.8–7.3)
PLATELET # BLD AUTO: 116 K/UL (ref 130–450)
PLATELET CONFIRMATION: NORMAL
PMV BLD AUTO: 12.9 FL (ref 7–12)
POTASSIUM SERPL-SCNC: 4.1 MMOL/L (ref 3.5–5)
PROT SERPL-MCNC: 6.7 G/DL (ref 6.4–8.3)
RBC # BLD AUTO: 4.2 M/UL (ref 3.5–5.5)
SODIUM SERPL-SCNC: 140 MMOL/L (ref 132–146)
WBC OTHER # BLD: 4.4 K/UL (ref 4.5–11.5)

## 2023-10-26 PROCEDURE — 82784 ASSAY IGA/IGD/IGG/IGM EACH: CPT

## 2023-10-26 PROCEDURE — 86140 C-REACTIVE PROTEIN: CPT

## 2023-10-26 PROCEDURE — 85652 RBC SED RATE AUTOMATED: CPT

## 2023-10-26 PROCEDURE — 85025 COMPLETE CBC W/AUTO DIFF WBC: CPT

## 2023-10-26 PROCEDURE — 80053 COMPREHEN METABOLIC PANEL: CPT

## 2023-10-26 PROCEDURE — 36415 COLL VENOUS BLD VENIPUNCTURE: CPT

## 2023-10-26 PROCEDURE — 86360 T CELL ABSOLUTE COUNT/RATIO: CPT

## 2023-10-26 PROCEDURE — 82787 IGG 1 2 3 OR 4 EACH: CPT

## 2023-10-26 PROCEDURE — 86359 T CELLS TOTAL COUNT: CPT

## 2023-10-27 LAB
CRP SERPL HS-MCNC: 12 MG/L (ref 0–5)
IGA SERPL-MCNC: 144 MG/DL (ref 70–400)
IGG SERPL-MCNC: 759 MG/DL (ref 700–1600)
IGM SERPL-MCNC: 117 MG/DL (ref 40–230)

## 2023-10-28 LAB
ANNOTATION COMMENT IMP: ABNORMAL
CD3 CELLS # BLD: 551 CELLS/UL (ref 660–2200)
CD3+CD4+ CELLS # BLD: 494 CELLS/UL (ref 490–1600)
CD3+CD4+ CELLS/CD3+CD8+ CLL BLD: 9.32 RATIO (ref 0.8–6.17)
CD3+CD8+ CELLS # BLD: 53 CELLS/UL (ref 150–1050)
IGG 1: 515 MG/DL (ref 240–1118)
IGG 2: 121 MG/DL (ref 124–549)
IGG 3: 89 MG/DL (ref 21–134)
IGG4 SER-MCNC: 14 MG/DL (ref 1–123)

## 2023-12-04 ENCOUNTER — HOSPITAL ENCOUNTER (OUTPATIENT)
Age: 78
Discharge: HOME | End: 2023-12-04
Payer: MEDICARE

## 2023-12-04 DIAGNOSIS — B44.9 ASPERGILLOSIS (HCC): ICD-10-CM

## 2023-12-04 DIAGNOSIS — J90 LOCULATED PLEURAL EFFUSION: ICD-10-CM

## 2023-12-04 DIAGNOSIS — D80.3 IGG2 DEFICIENCY (HCC): ICD-10-CM

## 2023-12-04 PROCEDURE — 36415 COLL VENOUS BLD VENIPUNCTURE: CPT

## 2023-12-04 PROCEDURE — 86360 T CELL ABSOLUTE COUNT/RATIO: CPT

## 2023-12-04 PROCEDURE — 86359 T CELLS TOTAL COUNT: CPT

## 2023-12-04 PROCEDURE — 87305 ASPERGILLUS AG IA: CPT

## 2023-12-04 PROCEDURE — 82787 IGG 1 2 3 OR 4 EACH: CPT

## 2023-12-04 PROCEDURE — 82784 ASSAY IGA/IGD/IGG/IGM EACH: CPT

## 2023-12-04 PROCEDURE — 87449 NOS EACH ORGANISM AG IA: CPT

## 2023-12-06 LAB
IGA SERPL-MCNC: 190 MG/DL (ref 70–400)
IGG SERPL-MCNC: 820 MG/DL (ref 700–1600)
IGM SERPL-MCNC: 115 MG/DL (ref 40–230)

## 2023-12-07 LAB
1,3 BETA GLUCAN SER-MCNC: <31 PG/ML
1,3 BETA-D-GLUCAN INTERP: NEGATIVE
ANNOTATION COMMENT IMP: ABNORMAL
CD3 CELLS # BLD: 488 CELLS/UL (ref 660–2200)
CD3+CD4+ CELLS # BLD: 440 CELLS/UL (ref 490–1600)
CD3+CD4+ CELLS/CD3+CD8+ CLL BLD: 9.36 RATIO (ref 0.8–6.17)
CD3+CD8+ CELLS # BLD: 47 CELLS/UL (ref 150–1050)
IGG 1: 498 MG/DL (ref 240–1118)
IGG 2: 119 MG/DL (ref 124–549)
IGG 3: 97 MG/DL (ref 21–134)
IGG4 SER-MCNC: 17 MG/DL (ref 1–123)

## 2023-12-08 LAB
ASPERGILLUS GALACTO AG: NEGATIVE
GALACTOMANNAN AG SPEC IA-ACNC: 0.09

## 2024-01-22 ENCOUNTER — HOSPITAL ENCOUNTER (OUTPATIENT)
Dept: CT IMAGING | Age: 79
Discharge: HOME | End: 2024-01-24
Attending: SPECIALIST
Payer: MEDICARE

## 2024-01-22 DIAGNOSIS — J90 LOCULATED PLEURAL EFFUSION: ICD-10-CM

## 2024-01-22 DIAGNOSIS — J98.4 CAVITARY LESION OF LUNG: ICD-10-CM

## 2024-01-22 PROCEDURE — 71250 CT THORAX DX C-: CPT

## 2024-02-09 ENCOUNTER — HOSPITAL ENCOUNTER (OUTPATIENT)
Dept: PET IMAGING | Age: 79
End: 2024-02-09
Attending: INTERNAL MEDICINE
Payer: MEDICARE

## 2024-02-09 DIAGNOSIS — R91.1 LUNG NODULE: ICD-10-CM

## 2024-02-09 LAB — GLUCOSE BLD-MCNC: 94 MG/DL (ref 74–99)

## 2024-02-09 PROCEDURE — 82962 GLUCOSE BLOOD TEST: CPT

## 2024-02-09 PROCEDURE — 3430000000 HC RX DIAGNOSTIC RADIOPHARMACEUTICAL: Performed by: RADIOLOGY

## 2024-02-09 PROCEDURE — 78815 PET IMAGE W/CT SKULL-THIGH: CPT

## 2024-02-09 PROCEDURE — A9609 HC RX DIAGNOSTIC RADIOPHARMACEUTICAL: HCPCS | Performed by: RADIOLOGY

## 2024-02-09 RX ORDER — FLUDEOXYGLUCOSE F 18 200 MCI/ML
15 INJECTION, SOLUTION INTRAVENOUS
Status: COMPLETED | OUTPATIENT
Start: 2024-02-09 | End: 2024-02-09

## 2024-02-09 RX ADMIN — FLUDEOXYGLUCOSE F 18 15 MILLICURIE: 200 INJECTION, SOLUTION INTRAVENOUS at 08:23

## 2024-06-07 ENCOUNTER — HOSPITAL ENCOUNTER (OUTPATIENT)
Dept: CT IMAGING | Age: 79
End: 2024-06-07
Attending: INTERNAL MEDICINE
Payer: MEDICARE

## 2024-06-07 DIAGNOSIS — B44.1 PULMONARY ASPERGILLOSIS (HCC): ICD-10-CM

## 2024-06-07 PROCEDURE — 71250 CT THORAX DX C-: CPT

## 2024-08-02 ENCOUNTER — OFFICE VISIT (OUTPATIENT)
Dept: PULMONOLOGY | Age: 79
End: 2024-08-02
Payer: MEDICARE

## 2024-08-02 VITALS
HEIGHT: 66 IN | BODY MASS INDEX: 26.68 KG/M2 | DIASTOLIC BLOOD PRESSURE: 63 MMHG | OXYGEN SATURATION: 98 % | WEIGHT: 166 LBS | RESPIRATION RATE: 18 BRPM | HEART RATE: 62 BPM | SYSTOLIC BLOOD PRESSURE: 142 MMHG | TEMPERATURE: 97 F

## 2024-08-02 DIAGNOSIS — J98.4 CAVITARY LESION OF LUNG: ICD-10-CM

## 2024-08-02 DIAGNOSIS — Z86.19 HISTORY OF ASPERGILLOMA: ICD-10-CM

## 2024-08-02 DIAGNOSIS — M06.9 RHEUMATOID ARTHRITIS OF OTHER SITE, UNSPECIFIED WHETHER RHEUMATOID FACTOR PRESENT (HCC): ICD-10-CM

## 2024-08-02 DIAGNOSIS — Z86.79 HISTORY OF CORONARY ARTERY DISEASE: ICD-10-CM

## 2024-08-02 DIAGNOSIS — J90 PLEURAL EFFUSION: Primary | ICD-10-CM

## 2024-08-02 PROCEDURE — 99213 OFFICE O/P EST LOW 20 MIN: CPT | Performed by: INTERNAL MEDICINE

## 2024-08-02 PROCEDURE — 3078F DIAST BP <80 MM HG: CPT | Performed by: INTERNAL MEDICINE

## 2024-08-02 PROCEDURE — 1123F ACP DISCUSS/DSCN MKR DOCD: CPT | Performed by: INTERNAL MEDICINE

## 2024-08-02 PROCEDURE — 3077F SYST BP >= 140 MM HG: CPT | Performed by: INTERNAL MEDICINE

## 2024-08-02 NOTE — PROGRESS NOTES
[facility]  PULMONARY/CRITICAL CARE PROGRESS NOTE    Patient: Myrna Giron  MRN: [id number]  : 1945    Encounter Date: 2024  Encounter Time: 3:27 PM     Reason for Follow Up: Pleural Effusion     Problem List:  Patient Active Problem List   Diagnosis    CAD (coronary artery disease)    Essential hypertension    Mixed hyperlipidemia    Loculated pleural effusion     SUBJECTIVE:   Ms. Giron is a 77 y/o female with past medical history noted, nonsmoker, known to [facility], with past medical history of CAD s/p stents x4, rheumatoid arthritis, thyroid disease, colon polyps, COVID-19 infection 2022, fungal cavities/ aspergilloma that is here for follow up.       presents to [facility] ED for right-sided chest pain, cough, runny nose for 3  Labs significant for creatinine 1.8, hemoglobin 10.4, platelets 127, alk phos 229  Rapid influenza A/B negative  COVID-negative  CT chest interval development of a small/moderate loculated right pleural effusion with some moderate fluid in the major fissure. Otherwise similar findings as seen previously      pulmonary consulted for right loculated pleural effusion on room air saturating 95%.  Patient seen resting in bed in no acute distress.  Reports right sided chest pain with deep inspiration.  Says she though she had a fractured rib.  Symptoms started 5 days prior on .   Denies any sick contacts, fevers, chills, chest pain, tightness, dyspnea, cough, hemoptysis, or wheezing.      Social History: no tobacco use, no alcohol use, no drug use     Occupational History:  - no occupational exposures to asbestos, beryllium, silica     HOME MEDICATIONS:  Prior to Admission medications    Medication Sig Start Date End Date Taking? Authorizing Provider   Multiple Vitamins-Minerals (PRESERVISION AREDS 2 PO) Take 2 tablets by mouth at bedtime   Yes Trenton Castrejon MD   Cetirizine HCl (ZYRTEC PO) Take by mouth every evening   Yes Trenton Castrejon MD

## 2024-08-28 ENCOUNTER — TELEPHONE (OUTPATIENT)
Dept: PULMONOLOGY | Age: 79
End: 2024-08-28

## 2024-08-28 NOTE — TELEPHONE ENCOUNTER
Mailed letter to patient to inform her of the CT of the Chest that is scheduled for her at [place], [place] . This test is scheduled on  Monday, October 28, 2024 at  11:00 am. Please arrive 30 minutes prior to appointment time. No Test Prep is needed

## 2024-10-28 ENCOUNTER — HOSPITAL ENCOUNTER (OUTPATIENT)
Dept: CT IMAGING | Age: 79
Discharge: HOME | End: 2024-10-30
Attending: INTERNAL MEDICINE
Payer: MEDICARE

## 2024-10-28 DIAGNOSIS — J98.4 CAVITARY LESION OF LUNG: ICD-10-CM

## 2024-10-28 PROCEDURE — 71250 CT THORAX DX C-: CPT

## 2024-10-31 ENCOUNTER — OFFICE VISIT (OUTPATIENT)
Dept: CARDIOLOGY CLINIC | Age: 79
End: 2024-10-31

## 2024-10-31 VITALS
SYSTOLIC BLOOD PRESSURE: 136 MMHG | RESPIRATION RATE: 18 BRPM | DIASTOLIC BLOOD PRESSURE: 94 MMHG | WEIGHT: 164 LBS | HEIGHT: 66 IN | BODY MASS INDEX: 26.36 KG/M2 | HEART RATE: 83 BPM

## 2024-10-31 DIAGNOSIS — I25.10 CORONARY ARTERY DISEASE INVOLVING NATIVE CORONARY ARTERY OF NATIVE HEART WITHOUT ANGINA PECTORIS: Primary | ICD-10-CM

## 2024-10-31 NOTE — PROGRESS NOTES
Chief Complaint   Patient presents with    Coronary Artery Disease       Patient Active Problem List    Diagnosis Date Noted    Loculated pleural effusion 07/27/2023    CAD (coronary artery disease) 08/28/2017     Overview Note:     A. Hx of remote LAD stenting - details unknown  B. NSTEMI  6/3/2016:   [place] ROSAMARIA 3.5 X 38 to RCA.  Mild ISR in LAD stent.  EF 45%      Essential hypertension 08/28/2017    Mixed hyperlipidemia 08/28/2017       Current Outpatient Medications   Medication Sig Dispense Refill    Multiple Vitamins-Minerals (PRESERVISION AREDS 2 PO) Take 2 tablets by mouth at bedtime      loperamide (IMODIUM) 2 MG capsule Take 1 capsule by mouth 4 times daily as needed for Diarrhea      Calcium Carbonate-Vitamin D (CALCIUM 600+D PO) Take 1 tablet by mouth at bedtime      Cetirizine HCl (ZYRTEC PO) Take by mouth every evening      aspirin 325 MG EC tablet Take 1 tablet by mouth daily Nightly      predniSONE 10 MG (21) TBPK Take 10 mg by mouth as needed Takes Tuesday, Thursday, Saturday, Sunday      rosuvastatin (CRESTOR) 10 MG tablet Take 1 tablet by mouth nightly 30 tablet 0    levothyroxine (SYNTHROID) 25 MCG tablet Take 1 tablet by mouth every other day Indications: every other day      ferrous sulfate 325 (65 FE) MG tablet Take 1 tablet by mouth daily (with breakfast)      vitamin D (CHOLECALCIFEROL) 41171 UNIT CAPS Take 1 capsule by mouth every 14 days Indications: every 2 weeks      metoprolol (TOPROL-XL) 50 MG XL tablet Take 1 tablet by mouth daily      Coenzyme Q10 (CO Q-10) 400 MG CAPS Take 400 mg by mouth      Multiple Vitamins-Minerals (CENTRUM SILVER PO) Take 1 tablet by mouth       No current facility-administered medications for this visit.        Allergies   Allergen Reactions    Plavix [Clopidogrel] Hives    Ace Inhibitors      cough       Vitals:    10/31/24 1211   BP: (!) 136/94   Pulse: 83   Resp: 18   Weight: 74.4 kg (164 lb)   Height: 1.676 m (5' 6\")           SUBJECTIVE: Myrna Giron

## 2024-11-01 ENCOUNTER — OFFICE VISIT (OUTPATIENT)
Dept: PULMONOLOGY | Age: 79
End: 2024-11-01

## 2024-11-01 VITALS
OXYGEN SATURATION: 97 % | SYSTOLIC BLOOD PRESSURE: 124 MMHG | HEART RATE: 89 BPM | RESPIRATION RATE: 14 BRPM | DIASTOLIC BLOOD PRESSURE: 62 MMHG

## 2024-11-01 DIAGNOSIS — J98.4 CAVITARY LESION OF LUNG: ICD-10-CM

## 2024-11-01 DIAGNOSIS — Z86.19 HISTORY OF ASPERGILLOMA: ICD-10-CM

## 2024-11-01 DIAGNOSIS — J90 PLEURAL EFFUSION: Primary | ICD-10-CM

## 2024-11-01 DIAGNOSIS — Z87.39 HISTORY OF RHEUMATOID ARTHRITIS: ICD-10-CM

## 2024-11-01 DIAGNOSIS — Z86.79 HISTORY OF CORONARY ARTERY DISEASE: ICD-10-CM

## 2024-11-01 RX ORDER — VIT C/B6/B5/MAGNESIUM/HERB 173 50-5-6-5MG
CAPSULE ORAL DAILY
COMMUNITY

## 2024-11-01 RX ORDER — HYDROXYCHLOROQUINE SULFATE 200 MG/1
TABLET, FILM COATED ORAL DAILY
COMMUNITY

## 2024-11-01 RX ORDER — GLUCOSAMINE SULFATE 500 MG
CAPSULE ORAL
COMMUNITY

## 2024-11-01 NOTE — PROGRESS NOTES
[facility]  PULMONARY/CRITICAL CARE PROGRESS NOTE    Patient: Myrna Giron  MRN: [id number]  : 1945    Encounter Date: 2024  Encounter Time: 2:35 PM     Reason for Follow Up: Pleural Effusion     Problem List:  Patient Active Problem List   Diagnosis    CAD (coronary artery disease)    Essential hypertension    Mixed hyperlipidemia    Loculated pleural effusion     SUBJECTIVE:   Ms. Giron is a 79 y/o female with past medical history noted, nonsmoker, known to [facility], with past medical history of CAD s/p stents x4, rheumatoid arthritis, thyroid disease, colon polyps, COVID-19 infection 2022, fungal cavities/ aspergilloma that is here for follow up.       presents to [facility] ED for right-sided chest pain, cough, runny nose for 3  Labs significant for creatinine 1.8, hemoglobin 10.4, platelets 127, alk phos 229  Rapid influenza A/B negative  COVID-negative  CT chest interval development of a small/moderate loculated right pleural effusion with some moderate fluid in the major fissure. Otherwise similar findings as seen previously      pulmonary consulted for right loculated pleural effusion on room air saturating 95%.  Patient seen resting in bed in no acute distress.  Reports right sided chest pain with deep inspiration.  Says she though she had a fractured rib.  Symptoms started 5 days prior on .   Denies any sick contacts, fevers, chills, chest pain, tightness, dyspnea, cough, hemoptysis, or wheezing.      Social History: no tobacco use, no alcohol use, no drug use     Occupational History:  - no occupational exposures to asbestos, beryllium, silica     HOME MEDICATIONS:  Prior to Admission medications    Medication Sig Start Date End Date Taking? Authorizing Provider   Glucosamine 500 MG CAPS Take by mouth   Yes Trenton Castrejon MD   turmeric (QC TUMERIC COMPLEX) 500 MG CAPS Take by mouth daily   Yes Trenton Castrejon MD   Cranberry 125 MG TABS Take by mouth   Yes

## 2024-11-24 ENCOUNTER — APPOINTMENT (OUTPATIENT)
Dept: CT IMAGING | Age: 79
End: 2024-11-24
Payer: MEDICARE

## 2024-11-24 ENCOUNTER — HOSPITAL ENCOUNTER (EMERGENCY)
Age: 79
Discharge: HOME | End: 2024-11-24
Attending: EMERGENCY MEDICINE
Payer: MEDICARE

## 2024-11-24 VITALS
WEIGHT: 165 LBS | BODY MASS INDEX: 26.63 KG/M2 | OXYGEN SATURATION: 100 % | HEART RATE: 80 BPM | RESPIRATION RATE: 16 BRPM | TEMPERATURE: 98.1 F | DIASTOLIC BLOOD PRESSURE: 64 MMHG | SYSTOLIC BLOOD PRESSURE: 146 MMHG

## 2024-11-24 DIAGNOSIS — S09.90XA CLOSED HEAD INJURY, INITIAL ENCOUNTER: Primary | ICD-10-CM

## 2024-11-24 DIAGNOSIS — S00.83XA CONTUSION OF FACE, INITIAL ENCOUNTER: ICD-10-CM

## 2024-11-24 PROCEDURE — 99284 EMERGENCY DEPT VISIT MOD MDM: CPT

## 2024-11-24 PROCEDURE — 72125 CT NECK SPINE W/O DYE: CPT

## 2024-11-24 PROCEDURE — 70450 CT HEAD/BRAIN W/O DYE: CPT

## 2024-11-24 ASSESSMENT — PAIN - FUNCTIONAL ASSESSMENT: PAIN_FUNCTIONAL_ASSESSMENT: NONE - DENIES PAIN

## 2024-11-24 NOTE — ED PROVIDER NOTES
found.      PAST MEDICAL HISTORY/Chronic Conditions Affecting Care      has a past medical history of CAD (coronary artery disease), Colon polyps, Osteoarthritis, and Thyroid disease.     EMERGENCY DEPARTMENT COURSE    Vitals:    Vitals:    11/24/24 1313 11/24/24 1324   BP: (!) 146/64    Pulse: 80    Resp: 16    Temp: 98.1 °F (36.7 °C)    TempSrc: Oral    SpO2: 100%    Weight:  74.8 kg (165 lb)       Patient was given the following medications:  Medications - No data to display      Is this patient to be included in the SEP-1 Core Measure due to severe sepsis or septic shock?   No   Exclusion criteria - the patient is NOT to be included for SEP-1 Core Measure due to:  Infection is not suspected          Medical Decision Making/Differential Diagnosis:    CC/HPI Summary, Social Determinants of health, Records Reviewed, DDx, testing done/not done, ED Course, Reassessment, disposition considerations/shared decision making with patient, consults, disposition:             Medical Decision Making  Amount and/or Complexity of Data Reviewed  Radiology: ordered.        MDM:  The differential diagnosis associated with the patient’s presentation includes: Epidural Hematoma, Subdural Hematoma, Parenchymal Brain contusion  or bleed, Subarachnoid hemorrhage, Skull Fracture, Neck Fracture or Dislocation, other.      My independent interpretation of the EKG and/or imaging in ED Course.     Discussed with radiology regarding test interpretation.na    Additional history obtained from or confirmed by: na    Management of the patient was discussed with: na    Escalation of care including admission/observation considered:discharge    Myrna Giron is a 79 y.o. female who presents for evaluation after head injury.  She states she tripped over a cord today and fell striking her head.  Denies LOC.  Denies any other injuries.  She does have a hematoma above the right eyebrow.  She does take a full aspirin daily.  Denies neck pain.     CT

## 2025-03-06 ENCOUNTER — TELEPHONE (OUTPATIENT)
Dept: PULMONOLOGY | Age: 80
End: 2025-03-06

## 2025-03-06 NOTE — TELEPHONE ENCOUNTER
Letter sent in the mail to patient to inform of appointment change with Dr. Valentino due to the most recent ICU/Consult schedules.

## 2025-03-16 ENCOUNTER — APPOINTMENT (OUTPATIENT)
Dept: CT IMAGING | Age: 80
End: 2025-03-16
Payer: MEDICARE

## 2025-03-16 ENCOUNTER — HOSPITAL ENCOUNTER (EMERGENCY)
Age: 80
Discharge: ANOTHER HOSPITAL | End: 2025-03-17
Attending: STUDENT IN AN ORGANIZED HEALTH CARE EDUCATION/TRAINING PROGRAM
Payer: MEDICARE

## 2025-03-16 ENCOUNTER — APPOINTMENT (OUTPATIENT)
Dept: GENERAL RADIOLOGY | Age: 80
End: 2025-03-16
Payer: MEDICARE

## 2025-03-16 DIAGNOSIS — N39.0 ACUTE UTI: ICD-10-CM

## 2025-03-16 DIAGNOSIS — N17.9 AKI (ACUTE KIDNEY INJURY): ICD-10-CM

## 2025-03-16 DIAGNOSIS — A41.9 SEPSIS WITHOUT ACUTE ORGAN DYSFUNCTION, DUE TO UNSPECIFIED ORGANISM (HCC): Primary | ICD-10-CM

## 2025-03-16 PROBLEM — N39.41 URGE INCONTINENCE OF URINE: Status: ACTIVE | Noted: 2025-03-16

## 2025-03-16 PROBLEM — D80.1 HYPOGAMMAGLOBULINEMIA: Status: ACTIVE | Noted: 2023-08-10

## 2025-03-16 PROBLEM — D61.818 PANCYTOPENIA: Status: ACTIVE | Noted: 2019-04-29

## 2025-03-16 PROBLEM — D64.9 ABSOLUTE ANEMIA: Status: ACTIVE | Noted: 2019-04-29

## 2025-03-16 PROBLEM — M06.00 SERONEGATIVE RHEUMATOID ARTHRITIS (HCC): Status: ACTIVE | Noted: 2018-06-06

## 2025-03-16 PROBLEM — J18.9 PNEUMONIA: Status: ACTIVE | Noted: 2025-03-16

## 2025-03-16 PROBLEM — G47.00 INSOMNIA: Status: ACTIVE | Noted: 2025-03-16

## 2025-03-16 PROBLEM — M54.50 LOW BACK PAIN: Status: ACTIVE | Noted: 2025-03-16

## 2025-03-16 PROBLEM — D80.3 SELECTIVE DEFICIENCY OF IGG (HCC): Status: ACTIVE | Noted: 2025-03-16

## 2025-03-16 PROBLEM — G56.20 ULNAR NEUROPATHY: Status: ACTIVE | Noted: 2025-03-16

## 2025-03-16 PROBLEM — A04.72 COLITIS DUE TO CLOSTRIDIOIDES DIFFICILE: Status: ACTIVE | Noted: 2025-03-16

## 2025-03-16 PROBLEM — M06.4 INFLAMMATORY POLYARTHROPATHY (HCC): Status: ACTIVE | Noted: 2018-06-06

## 2025-03-16 PROBLEM — N32.81 OVERACTIVE BLADDER: Status: ACTIVE | Noted: 2025-03-16

## 2025-03-16 PROBLEM — M19.039 OSTEOARTHRITIS OF WRIST: Status: ACTIVE | Noted: 2025-03-16

## 2025-03-16 PROBLEM — B44.81 ALLERGIC BRONCHOPULMONARY ASPERGILLOSIS (HCC): Status: ACTIVE | Noted: 2025-03-16

## 2025-03-16 PROBLEM — E03.9 HYPOTHYROIDISM: Status: ACTIVE | Noted: 2025-03-16

## 2025-03-16 PROBLEM — N95.2 ATROPHIC VAGINITIS: Status: ACTIVE | Noted: 2025-03-16

## 2025-03-16 PROBLEM — R35.0 INCREASED FREQUENCY OF URINATION: Status: ACTIVE | Noted: 2025-01-21

## 2025-03-16 LAB
ALBUMIN SERPL-MCNC: 3.8 G/DL (ref 3.5–5.2)
ALP SERPL-CCNC: 102 U/L (ref 35–104)
ALT SERPL-CCNC: 12 U/L (ref 0–32)
ANION GAP SERPL CALCULATED.3IONS-SCNC: 15 MMOL/L (ref 7–16)
AST SERPL-CCNC: 19 U/L (ref 0–31)
BACTERIA URNS QL MICRO: ABNORMAL
BASOPHILS # BLD: 0.02 K/UL (ref 0–0.2)
BASOPHILS NFR BLD: 0 % (ref 0–2)
BILIRUB SERPL-MCNC: 1.1 MG/DL (ref 0–1.2)
BILIRUB UR QL STRIP: NEGATIVE
BUN SERPL-MCNC: 30 MG/DL (ref 6–23)
CALCIUM SERPL-MCNC: 8.9 MG/DL (ref 8.6–10.2)
CHLORIDE SERPL-SCNC: 104 MMOL/L (ref 98–107)
CLARITY UR: ABNORMAL
CO2 SERPL-SCNC: 18 MMOL/L (ref 22–29)
COLOR UR: YELLOW
CREAT SERPL-MCNC: 1.4 MG/DL (ref 0.5–1)
D-DIMER QUANTITATIVE: 1873 NG/ML DDU (ref 0–230)
EOSINOPHIL # BLD: 0 K/UL (ref 0.05–0.5)
EOSINOPHILS RELATIVE PERCENT: 0 % (ref 0–6)
ERYTHROCYTE [DISTWIDTH] IN BLOOD BY AUTOMATED COUNT: 13.2 % (ref 11.5–15)
FLUAV RNA RESP QL NAA+PROBE: NOT DETECTED
FLUBV RNA RESP QL NAA+PROBE: NOT DETECTED
GFR, ESTIMATED: 40 ML/MIN/1.73M2
GLUCOSE SERPL-MCNC: 103 MG/DL (ref 74–99)
GLUCOSE UR STRIP-MCNC: NEGATIVE MG/DL
HCT VFR BLD AUTO: 39.2 % (ref 34–48)
HGB BLD-MCNC: 12.7 G/DL (ref 11.5–15.5)
HGB UR QL STRIP.AUTO: ABNORMAL
IMM GRANULOCYTES # BLD AUTO: 0.03 K/UL (ref 0–0.58)
IMM GRANULOCYTES NFR BLD: 0 % (ref 0–5)
KETONES UR STRIP-MCNC: 15 MG/DL
LACTATE BLDV-SCNC: 1.4 MMOL/L (ref 0.5–1.9)
LEUKOCYTE ESTERASE UR QL STRIP: ABNORMAL
LYMPHOCYTES NFR BLD: 0.25 K/UL (ref 1.5–4)
LYMPHOCYTES RELATIVE PERCENT: 3 % (ref 20–42)
MCH RBC QN AUTO: 29.1 PG (ref 26–35)
MCHC RBC AUTO-ENTMCNC: 32.4 G/DL (ref 32–34.5)
MCV RBC AUTO: 89.9 FL (ref 80–99.9)
MONOCYTES NFR BLD: 0.56 K/UL (ref 0.1–0.95)
MONOCYTES NFR BLD: 7 % (ref 2–12)
NEUTROPHILS NFR BLD: 90 % (ref 43–80)
NEUTS SEG NFR BLD: 7.33 K/UL (ref 1.8–7.3)
NITRITE UR QL STRIP: NEGATIVE
PH UR STRIP: 6 [PH] (ref 5–8)
PLATELET # BLD AUTO: 71 K/UL (ref 130–450)
PLATELET CONFIRMATION: NORMAL
PMV BLD AUTO: 10.7 FL (ref 7–12)
POTASSIUM SERPL-SCNC: 4 MMOL/L (ref 3.5–5)
PROT SERPL-MCNC: 6.8 G/DL (ref 6.4–8.3)
PROT UR STRIP-MCNC: 30 MG/DL
RBC # BLD AUTO: 4.36 M/UL (ref 3.5–5.5)
RBC #/AREA URNS HPF: ABNORMAL /HPF
SARS-COV-2 RNA RESP QL NAA+PROBE: NOT DETECTED
SODIUM SERPL-SCNC: 137 MMOL/L (ref 132–146)
SOURCE: NORMAL
SP GR UR STRIP: 1.02 (ref 1–1.03)
SPECIMEN DESCRIPTION: NORMAL
TROPONIN I SERPL HS-MCNC: 20 NG/L (ref 0–9)
UROBILINOGEN UR STRIP-ACNC: 0.2 EU/DL (ref 0–1)
WBC #/AREA URNS HPF: ABNORMAL /HPF
WBC OTHER # BLD: 8.2 K/UL (ref 4.5–11.5)

## 2025-03-16 PROCEDURE — 80053 COMPREHEN METABOLIC PANEL: CPT

## 2025-03-16 PROCEDURE — 99285 EMERGENCY DEPT VISIT HI MDM: CPT

## 2025-03-16 PROCEDURE — 87040 BLOOD CULTURE FOR BACTERIA: CPT

## 2025-03-16 PROCEDURE — 2580000003 HC RX 258: Performed by: STUDENT IN AN ORGANIZED HEALTH CARE EDUCATION/TRAINING PROGRAM

## 2025-03-16 PROCEDURE — 6360000002 HC RX W HCPCS: Performed by: STUDENT IN AN ORGANIZED HEALTH CARE EDUCATION/TRAINING PROGRAM

## 2025-03-16 PROCEDURE — 87636 SARSCOV2 & INF A&B AMP PRB: CPT

## 2025-03-16 PROCEDURE — 6370000000 HC RX 637 (ALT 250 FOR IP): Performed by: STUDENT IN AN ORGANIZED HEALTH CARE EDUCATION/TRAINING PROGRAM

## 2025-03-16 PROCEDURE — 85025 COMPLETE CBC W/AUTO DIFF WBC: CPT

## 2025-03-16 PROCEDURE — 83605 ASSAY OF LACTIC ACID: CPT

## 2025-03-16 PROCEDURE — 93005 ELECTROCARDIOGRAM TRACING: CPT | Performed by: NURSE PRACTITIONER

## 2025-03-16 PROCEDURE — 71275 CT ANGIOGRAPHY CHEST: CPT

## 2025-03-16 PROCEDURE — 71045 X-RAY EXAM CHEST 1 VIEW: CPT

## 2025-03-16 PROCEDURE — 70450 CT HEAD/BRAIN W/O DYE: CPT

## 2025-03-16 PROCEDURE — 96375 TX/PRO/DX INJ NEW DRUG ADDON: CPT

## 2025-03-16 PROCEDURE — 84484 ASSAY OF TROPONIN QUANT: CPT

## 2025-03-16 PROCEDURE — 6360000004 HC RX CONTRAST MEDICATION: Performed by: RADIOLOGY

## 2025-03-16 PROCEDURE — 81001 URINALYSIS AUTO W/SCOPE: CPT

## 2025-03-16 PROCEDURE — 85379 FIBRIN DEGRADATION QUANT: CPT

## 2025-03-16 PROCEDURE — 87077 CULTURE AEROBIC IDENTIFY: CPT

## 2025-03-16 PROCEDURE — 87086 URINE CULTURE/COLONY COUNT: CPT

## 2025-03-16 PROCEDURE — 96374 THER/PROPH/DIAG INJ IV PUSH: CPT

## 2025-03-16 PROCEDURE — 2500000003 HC RX 250 WO HCPCS: Performed by: STUDENT IN AN ORGANIZED HEALTH CARE EDUCATION/TRAINING PROGRAM

## 2025-03-16 PROCEDURE — 74177 CT ABD & PELVIS W/CONTRAST: CPT

## 2025-03-16 RX ORDER — MECLIZINE HCL 12.5 MG 12.5 MG/1
12.5 TABLET ORAL ONCE
Status: COMPLETED | OUTPATIENT
Start: 2025-03-16 | End: 2025-03-16

## 2025-03-16 RX ORDER — IOPAMIDOL 755 MG/ML
75 INJECTION, SOLUTION INTRAVASCULAR
Status: COMPLETED | OUTPATIENT
Start: 2025-03-16 | End: 2025-03-16

## 2025-03-16 RX ORDER — ACETAMINOPHEN 325 MG/1
650 TABLET ORAL ONCE
Status: COMPLETED | OUTPATIENT
Start: 2025-03-16 | End: 2025-03-16

## 2025-03-16 RX ORDER — ACETAMINOPHEN 500 MG
1000 TABLET ORAL ONCE
Status: COMPLETED | OUTPATIENT
Start: 2025-03-16 | End: 2025-03-16

## 2025-03-16 RX ORDER — KETOROLAC TROMETHAMINE 30 MG/ML
10 INJECTION, SOLUTION INTRAMUSCULAR; INTRAVENOUS ONCE
Status: COMPLETED | OUTPATIENT
Start: 2025-03-16 | End: 2025-03-16

## 2025-03-16 RX ORDER — 0.9 % SODIUM CHLORIDE 0.9 %
1000 INTRAVENOUS SOLUTION INTRAVENOUS ONCE
Status: COMPLETED | OUTPATIENT
Start: 2025-03-16 | End: 2025-03-16

## 2025-03-16 RX ADMIN — ACETAMINOPHEN 650 MG: 325 TABLET ORAL at 23:29

## 2025-03-16 RX ADMIN — IOPAMIDOL 75 ML: 755 INJECTION, SOLUTION INTRAVENOUS at 21:51

## 2025-03-16 RX ADMIN — SODIUM CHLORIDE 1000 ML: 0.9 INJECTION, SOLUTION INTRAVENOUS at 18:35

## 2025-03-16 RX ADMIN — ACETAMINOPHEN 1000 MG: 500 TABLET ORAL at 18:43

## 2025-03-16 RX ADMIN — KETOROLAC TROMETHAMINE 9.9 MG: 30 INJECTION, SOLUTION INTRAMUSCULAR at 23:55

## 2025-03-16 RX ADMIN — CEFTRIAXONE SODIUM 2000 MG: 2 INJECTION, POWDER, FOR SOLUTION INTRAMUSCULAR; INTRAVENOUS at 20:57

## 2025-03-16 RX ADMIN — MECLIZINE 12.5 MG: 12.5 TABLET ORAL at 18:30

## 2025-03-16 ASSESSMENT — LIFESTYLE VARIABLES
HOW MANY STANDARD DRINKS CONTAINING ALCOHOL DO YOU HAVE ON A TYPICAL DAY: PATIENT DOES NOT DRINK
HOW OFTEN DO YOU HAVE A DRINK CONTAINING ALCOHOL: NEVER

## 2025-03-16 ASSESSMENT — PAIN DESCRIPTION - LOCATION: LOCATION: GENERALIZED

## 2025-03-16 ASSESSMENT — PAIN DESCRIPTION - ONSET: ONSET: ON-GOING

## 2025-03-16 ASSESSMENT — PAIN - FUNCTIONAL ASSESSMENT: PAIN_FUNCTIONAL_ASSESSMENT: 0-10

## 2025-03-16 ASSESSMENT — PAIN DESCRIPTION - PAIN TYPE: TYPE: ACUTE PAIN

## 2025-03-16 ASSESSMENT — PAIN DESCRIPTION - FREQUENCY: FREQUENCY: CONTINUOUS

## 2025-03-16 ASSESSMENT — PAIN SCALES - GENERAL: PAINLEVEL_OUTOF10: 10

## 2025-03-16 ASSESSMENT — PAIN DESCRIPTION - DESCRIPTORS: DESCRIPTORS: ACHING;DISCOMFORT;SORE

## 2025-03-16 NOTE — ED PROVIDER NOTES
HPI   This is a 79-year-old female patient who presents to emergency department for evaluation of dizziness.  She describes the dizziness as present when she changes body position or moves her head.  When she is not moving and at rest she does not have any symptoms.  She reports symptoms started approximately at 6 PM yesterday.  She denies any numbness or weakness in extremities.  She denies any chest pain or shortness of breath.  She denies any nausea vomiting or diarrhea.  She does have some associated fatigue.  She lives live alone.  No history of vertigo.  She is not on any anticoagulation.  She denies any history of DVT or PE.  Review of Systems   Constitutional:  Positive for fatigue. Negative for chills and fever.   HENT:  Negative for ear pain, sinus pressure and sore throat.    Eyes:  Negative for pain, discharge and redness.   Respiratory:  Negative for cough, shortness of breath and wheezing.    Cardiovascular:  Negative for chest pain.   Gastrointestinal:  Negative for abdominal distention, diarrhea, nausea and vomiting.   Genitourinary:  Negative for dysuria and frequency.   Musculoskeletal:  Negative for arthralgias and back pain.   Skin:  Negative for rash and wound.   Neurological:  Positive for dizziness. Negative for weakness and headaches.   Hematological:  Negative for adenopathy.   All other systems reviewed and are negative.       Physical Exam  Vitals and nursing note reviewed.   Constitutional:       Appearance: Normal appearance.   HENT:      Head: Normocephalic and atraumatic.      Nose: Nose normal. No congestion.      Mouth/Throat:      Mouth: Mucous membranes are moist.      Pharynx: Oropharynx is clear.   Eyes:      Conjunctiva/sclera: Conjunctivae normal.      Pupils: Pupils are equal, round, and reactive to light.   Cardiovascular:      Rate and Rhythm: Normal rate and regular rhythm.      Pulses: Normal pulses.      Heart sounds: Normal heart sounds.   Pulmonary:      Effort:

## 2025-03-17 ENCOUNTER — HOSPITAL ENCOUNTER (INPATIENT)
Age: 80
LOS: 3 days | Discharge: HOME | DRG: 872 | End: 2025-03-20
Attending: HOSPITALIST | Admitting: HOSPITALIST
Payer: MEDICARE

## 2025-03-17 VITALS
DIASTOLIC BLOOD PRESSURE: 53 MMHG | SYSTOLIC BLOOD PRESSURE: 95 MMHG | TEMPERATURE: 101.1 F | RESPIRATION RATE: 33 BRPM | OXYGEN SATURATION: 94 % | HEART RATE: 92 BPM

## 2025-03-17 DIAGNOSIS — E87.20 METABOLIC ACIDOSIS: ICD-10-CM

## 2025-03-17 DIAGNOSIS — N17.9 AKI (ACUTE KIDNEY INJURY): Primary | Chronic | ICD-10-CM

## 2025-03-17 PROBLEM — A41.9 SEVERE SEPSIS: Status: ACTIVE | Noted: 2025-03-17

## 2025-03-17 PROBLEM — M06.00 SERONEGATIVE RHEUMATOID ARTHRITIS (HCC): Chronic | Status: ACTIVE | Noted: 2018-06-06

## 2025-03-17 PROBLEM — A41.9 SEPSIS (HCC): Status: ACTIVE | Noted: 2025-03-17

## 2025-03-17 PROBLEM — R65.20 SEVERE SEPSIS: Status: ACTIVE | Noted: 2025-03-17

## 2025-03-17 PROBLEM — D69.6 THROMBOCYTOPENIA: Chronic | Status: ACTIVE | Noted: 2025-03-17

## 2025-03-17 PROBLEM — A41.9 SEVERE SEPSIS: Chronic | Status: ACTIVE | Noted: 2025-03-17

## 2025-03-17 PROBLEM — R65.20 SEVERE SEPSIS: Chronic | Status: ACTIVE | Noted: 2025-03-17

## 2025-03-17 PROBLEM — I25.10 CAD (CORONARY ARTERY DISEASE): Chronic | Status: ACTIVE | Noted: 2017-08-28

## 2025-03-17 PROBLEM — N39.0 RECURRENT URINARY TRACT INFECTION: Chronic | Status: ACTIVE | Noted: 2025-03-16

## 2025-03-17 PROBLEM — D69.6 THROMBOCYTOPENIA: Status: ACTIVE | Noted: 2025-03-17

## 2025-03-17 PROBLEM — D80.3 SELECTIVE DEFICIENCY OF IGG (HCC): Chronic | Status: ACTIVE | Noted: 2025-03-16

## 2025-03-17 LAB
ANION GAP SERPL CALCULATED.3IONS-SCNC: 14 MMOL/L (ref 7–16)
B PARAP IS1001 DNA NPH QL NAA+NON-PROBE: NOT DETECTED
B PERT DNA SPEC QL NAA+PROBE: NOT DETECTED
BASOPHILS # BLD: 0.01 K/UL (ref 0–0.2)
BASOPHILS NFR BLD: 0 % (ref 0–2)
BUN SERPL-MCNC: 27 MG/DL (ref 6–23)
C PNEUM DNA NPH QL NAA+NON-PROBE: NOT DETECTED
CALCIUM SERPL-MCNC: 8 MG/DL (ref 8.6–10.2)
CHLORIDE SERPL-SCNC: 106 MMOL/L (ref 98–107)
CO2 SERPL-SCNC: 16 MMOL/L (ref 22–29)
CREAT SERPL-MCNC: 1.3 MG/DL (ref 0.5–1)
CRP SERPL HS-MCNC: 158 MG/L (ref 0–5)
EKG ATRIAL RATE: 107 BPM
EKG P AXIS: 16 DEGREES
EKG P-R INTERVAL: 136 MS
EKG Q-T INTERVAL: 334 MS
EKG QRS DURATION: 94 MS
EKG QTC CALCULATION (BAZETT): 445 MS
EKG R AXIS: 9 DEGREES
EKG T AXIS: 14 DEGREES
EKG VENTRICULAR RATE: 107 BPM
EOSINOPHIL # BLD: 0 K/UL (ref 0.05–0.5)
EOSINOPHILS RELATIVE PERCENT: 0 % (ref 0–6)
ERYTHROCYTE [DISTWIDTH] IN BLOOD BY AUTOMATED COUNT: 13.5 % (ref 11.5–15)
ERYTHROCYTE [SEDIMENTATION RATE] IN BLOOD BY WESTERGREN METHOD: 32 MM/HR (ref 0–20)
FLUAV RNA NPH QL NAA+NON-PROBE: NOT DETECTED
FLUBV RNA NPH QL NAA+NON-PROBE: NOT DETECTED
GFR, ESTIMATED: 41 ML/MIN/1.73M2
GLUCOSE SERPL-MCNC: 131 MG/DL (ref 74–99)
HADV DNA NPH QL NAA+NON-PROBE: NOT DETECTED
HCOV 229E RNA NPH QL NAA+NON-PROBE: NOT DETECTED
HCOV HKU1 RNA NPH QL NAA+NON-PROBE: NOT DETECTED
HCOV NL63 RNA NPH QL NAA+NON-PROBE: NOT DETECTED
HCOV OC43 RNA NPH QL NAA+NON-PROBE: NOT DETECTED
HCT VFR BLD AUTO: 32.4 % (ref 34–48)
HGB BLD-MCNC: 10.5 G/DL (ref 11.5–15.5)
HMPV RNA NPH QL NAA+NON-PROBE: NOT DETECTED
HPIV1 RNA NPH QL NAA+NON-PROBE: NOT DETECTED
HPIV2 RNA NPH QL NAA+NON-PROBE: NOT DETECTED
HPIV3 RNA NPH QL NAA+NON-PROBE: NOT DETECTED
HPIV4 RNA NPH QL NAA+NON-PROBE: NOT DETECTED
IMM GRANULOCYTES # BLD AUTO: 0.03 K/UL (ref 0–0.58)
IMM GRANULOCYTES NFR BLD: 1 % (ref 0–5)
LYMPHOCYTES NFR BLD: 0.1 K/UL (ref 1.5–4)
LYMPHOCYTES RELATIVE PERCENT: 2 % (ref 20–42)
M PNEUMO DNA NPH QL NAA+NON-PROBE: NOT DETECTED
MCH RBC QN AUTO: 29.7 PG (ref 26–35)
MCHC RBC AUTO-ENTMCNC: 32.4 G/DL (ref 32–34.5)
MCV RBC AUTO: 91.8 FL (ref 80–99.9)
MONOCYTES NFR BLD: 0.22 K/UL (ref 0.1–0.95)
MONOCYTES NFR BLD: 4 % (ref 2–12)
NEUTROPHILS NFR BLD: 94 % (ref 43–80)
NEUTS SEG NFR BLD: 5.13 K/UL (ref 1.8–7.3)
PLATELET # BLD AUTO: 58 K/UL (ref 130–450)
PLATELET CONFIRMATION: NORMAL
PMV BLD AUTO: 11.5 FL (ref 7–12)
POTASSIUM SERPL-SCNC: 3.7 MMOL/L (ref 3.5–5)
PROCALCITONIN SERPL-MCNC: 1.17 NG/ML (ref 0–0.08)
RBC # BLD AUTO: 3.53 M/UL (ref 3.5–5.5)
RBC # BLD: ABNORMAL 10*6/UL
RBC # BLD: ABNORMAL 10*6/UL
RSV RNA NPH QL NAA+NON-PROBE: NOT DETECTED
RV+EV RNA NPH QL NAA+NON-PROBE: NOT DETECTED
SARS-COV-2 RNA NPH QL NAA+NON-PROBE: NOT DETECTED
SODIUM SERPL-SCNC: 136 MMOL/L (ref 132–146)
SPECIMEN DESCRIPTION: NORMAL
TROPONIN I SERPL HS-MCNC: 29 NG/L (ref 0–9)
WBC OTHER # BLD: 5.5 K/UL (ref 4.5–11.5)

## 2025-03-17 PROCEDURE — 36415 COLL VENOUS BLD VENIPUNCTURE: CPT

## 2025-03-17 PROCEDURE — 85652 RBC SED RATE AUTOMATED: CPT

## 2025-03-17 PROCEDURE — 2580000003 HC RX 258: Performed by: INTERNAL MEDICINE

## 2025-03-17 PROCEDURE — 6370000000 HC RX 637 (ALT 250 FOR IP): Performed by: HOSPITALIST

## 2025-03-17 PROCEDURE — 85025 COMPLETE CBC W/AUTO DIFF WBC: CPT

## 2025-03-17 PROCEDURE — 86140 C-REACTIVE PROTEIN: CPT

## 2025-03-17 PROCEDURE — 84484 ASSAY OF TROPONIN QUANT: CPT

## 2025-03-17 PROCEDURE — 2500000003 HC RX 250 WO HCPCS: Performed by: HOSPITALIST

## 2025-03-17 PROCEDURE — 2060000000 HC ICU INTERMEDIATE R&B

## 2025-03-17 PROCEDURE — 6360000002 HC RX W HCPCS: Performed by: HOSPITALIST

## 2025-03-17 PROCEDURE — 84145 PROCALCITONIN (PCT): CPT

## 2025-03-17 PROCEDURE — 99223 1ST HOSP IP/OBS HIGH 75: CPT | Performed by: HOSPITALIST

## 2025-03-17 PROCEDURE — 0202U NFCT DS 22 TRGT SARS-COV-2: CPT

## 2025-03-17 PROCEDURE — 2500000003 HC RX 250 WO HCPCS: Performed by: INTERNAL MEDICINE

## 2025-03-17 PROCEDURE — 2580000003 HC RX 258: Performed by: STUDENT IN AN ORGANIZED HEALTH CARE EDUCATION/TRAINING PROGRAM

## 2025-03-17 PROCEDURE — 2580000003 HC RX 258: Performed by: HOSPITALIST

## 2025-03-17 PROCEDURE — 80048 BASIC METABOLIC PNL TOTAL CA: CPT

## 2025-03-17 RX ORDER — ACETAMINOPHEN 325 MG/1
650 TABLET ORAL EVERY 6 HOURS PRN
Status: DISCONTINUED | OUTPATIENT
Start: 2025-03-17 | End: 2025-03-20 | Stop reason: HOSPADM

## 2025-03-17 RX ORDER — ENOXAPARIN SODIUM 100 MG/ML
40 INJECTION SUBCUTANEOUS DAILY
Status: DISCONTINUED | OUTPATIENT
Start: 2025-03-17 | End: 2025-03-20 | Stop reason: HOSPADM

## 2025-03-17 RX ORDER — ASPIRIN 325 MG
325 TABLET, DELAYED RELEASE (ENTERIC COATED) ORAL DAILY
Status: DISCONTINUED | OUTPATIENT
Start: 2025-03-17 | End: 2025-03-20 | Stop reason: HOSPADM

## 2025-03-17 RX ORDER — SODIUM CHLORIDE 9 MG/ML
INJECTION, SOLUTION INTRAVENOUS PRN
Status: DISCONTINUED | OUTPATIENT
Start: 2025-03-17 | End: 2025-03-20 | Stop reason: HOSPADM

## 2025-03-17 RX ORDER — PREDNISONE 5 MG/1
5 TABLET ORAL DAILY PRN
Status: DISCONTINUED | OUTPATIENT
Start: 2025-03-17 | End: 2025-03-20 | Stop reason: HOSPADM

## 2025-03-17 RX ORDER — SODIUM CHLORIDE 0.9 % (FLUSH) 0.9 %
5-40 SYRINGE (ML) INJECTION EVERY 12 HOURS SCHEDULED
Status: DISCONTINUED | OUTPATIENT
Start: 2025-03-17 | End: 2025-03-20 | Stop reason: HOSPADM

## 2025-03-17 RX ORDER — METOPROLOL SUCCINATE 50 MG/1
50 TABLET, EXTENDED RELEASE ORAL DAILY
Status: DISCONTINUED | OUTPATIENT
Start: 2025-03-17 | End: 2025-03-20 | Stop reason: HOSPADM

## 2025-03-17 RX ORDER — SENNOSIDES A AND B 8.6 MG/1
1 TABLET, FILM COATED ORAL DAILY PRN
Status: DISCONTINUED | OUTPATIENT
Start: 2025-03-17 | End: 2025-03-20 | Stop reason: HOSPADM

## 2025-03-17 RX ORDER — LEVOTHYROXINE SODIUM 25 UG/1
25 TABLET ORAL
Status: DISCONTINUED | OUTPATIENT
Start: 2025-03-17 | End: 2025-03-20 | Stop reason: HOSPADM

## 2025-03-17 RX ORDER — SODIUM CHLORIDE 9 MG/ML
INJECTION, SOLUTION INTRAVENOUS CONTINUOUS
Status: DISCONTINUED | OUTPATIENT
Start: 2025-03-17 | End: 2025-03-17

## 2025-03-17 RX ORDER — ACETAMINOPHEN 650 MG/1
650 SUPPOSITORY RECTAL EVERY 6 HOURS PRN
Status: DISCONTINUED | OUTPATIENT
Start: 2025-03-17 | End: 2025-03-20 | Stop reason: HOSPADM

## 2025-03-17 RX ORDER — 0.9 % SODIUM CHLORIDE 0.9 %
1000 INTRAVENOUS SOLUTION INTRAVENOUS ONCE
Status: COMPLETED | OUTPATIENT
Start: 2025-03-17 | End: 2025-03-17

## 2025-03-17 RX ORDER — SODIUM CHLORIDE 0.9 % (FLUSH) 0.9 %
5-40 SYRINGE (ML) INJECTION PRN
Status: DISCONTINUED | OUTPATIENT
Start: 2025-03-17 | End: 2025-03-20 | Stop reason: HOSPADM

## 2025-03-17 RX ORDER — ONDANSETRON 4 MG/1
4 TABLET, ORALLY DISINTEGRATING ORAL EVERY 8 HOURS PRN
Status: DISCONTINUED | OUTPATIENT
Start: 2025-03-17 | End: 2025-03-20 | Stop reason: HOSPADM

## 2025-03-17 RX ORDER — POLYETHYLENE GLYCOL 3350 17 G/17G
17 POWDER, FOR SOLUTION ORAL DAILY PRN
Status: DISCONTINUED | OUTPATIENT
Start: 2025-03-17 | End: 2025-03-20 | Stop reason: HOSPADM

## 2025-03-17 RX ORDER — ROSUVASTATIN CALCIUM 10 MG/1
10 TABLET, COATED ORAL NIGHTLY
Status: DISCONTINUED | OUTPATIENT
Start: 2025-03-17 | End: 2025-03-20 | Stop reason: HOSPADM

## 2025-03-17 RX ORDER — ONDANSETRON 2 MG/ML
4 INJECTION INTRAMUSCULAR; INTRAVENOUS EVERY 6 HOURS PRN
Status: DISCONTINUED | OUTPATIENT
Start: 2025-03-17 | End: 2025-03-20 | Stop reason: HOSPADM

## 2025-03-17 RX ADMIN — SODIUM CHLORIDE 1000 ML: 0.9 INJECTION, SOLUTION INTRAVENOUS at 00:50

## 2025-03-17 RX ADMIN — WATER 2000 MG: 1 INJECTION INTRAMUSCULAR; INTRAVENOUS; SUBCUTANEOUS at 20:00

## 2025-03-17 RX ADMIN — ACETAMINOPHEN 650 MG: 325 TABLET ORAL at 22:40

## 2025-03-17 RX ADMIN — SODIUM BICARBONATE: 84 INJECTION, SOLUTION INTRAVENOUS at 22:41

## 2025-03-17 RX ADMIN — ENOXAPARIN SODIUM 40 MG: 100 INJECTION SUBCUTANEOUS at 08:53

## 2025-03-17 RX ADMIN — ACETAMINOPHEN 650 MG: 325 TABLET ORAL at 08:58

## 2025-03-17 RX ADMIN — ROSUVASTATIN CALCIUM 10 MG: 10 TABLET, FILM COATED ORAL at 20:01

## 2025-03-17 RX ADMIN — LEVOTHYROXINE SODIUM 25 MCG: 25 TABLET ORAL at 06:38

## 2025-03-17 RX ADMIN — SODIUM CHLORIDE: 9 INJECTION, SOLUTION INTRAVENOUS at 18:58

## 2025-03-17 RX ADMIN — ASPIRIN 325 MG: 325 TABLET, COATED ORAL at 08:53

## 2025-03-17 RX ADMIN — SODIUM CHLORIDE: 9 INJECTION, SOLUTION INTRAVENOUS at 05:30

## 2025-03-17 RX ADMIN — SODIUM CHLORIDE, PRESERVATIVE FREE 10 ML: 5 INJECTION INTRAVENOUS at 08:53

## 2025-03-17 RX ADMIN — METOPROLOL SUCCINATE 50 MG: 50 TABLET, FILM COATED, EXTENDED RELEASE ORAL at 08:53

## 2025-03-17 ASSESSMENT — PAIN DESCRIPTION - DESCRIPTORS: DESCRIPTORS: ACHING

## 2025-03-17 ASSESSMENT — VISUAL ACUITY: OU: 1

## 2025-03-17 ASSESSMENT — PAIN DESCRIPTION - LOCATION: LOCATION: KNEE;ANKLE

## 2025-03-17 ASSESSMENT — PAIN SCALES - GENERAL
PAINLEVEL_OUTOF10: 1
PAINLEVEL_OUTOF10: 6
PAINLEVEL_OUTOF10: 7

## 2025-03-17 ASSESSMENT — PAIN DESCRIPTION - ORIENTATION: ORIENTATION: RIGHT;LEFT

## 2025-03-17 NOTE — PLAN OF CARE
Problem: Safety - Adult  Goal: Free from fall injury  3/17/2025 1053 by Alejandra Newton RN  Outcome: Progressing  Flowsheets (Taken 3/17/2025 0830)  Free From Fall Injury: Instruct family/caregiver on patient safety  3/17/2025 0623 by Braeden Adler RN  Outcome: Progressing     Problem: Pain  Goal: Verbalizes/displays adequate comfort level or baseline comfort level  3/17/2025 1053 by Alejandra Newton RN  Outcome: Progressing  Flowsheets (Taken 3/17/2025 0830)  Verbalizes/displays adequate comfort level or baseline comfort level: Encourage patient to monitor pain and request assistance  3/17/2025 0623 by Braeden Adler RN  Outcome: Progressing     Problem: Discharge Planning  Goal: Discharge to home or other facility with appropriate resources  3/17/2025 1053 by Alejandra Newton RN  Outcome: Progressing  Flowsheets (Taken 3/17/2025 0830)  Discharge to home or other facility with appropriate resources: Identify barriers to discharge with patient and caregiver  3/17/2025 0623 by Braeden Adler RN  Outcome: Progressing

## 2025-03-17 NOTE — H&P
intracranial abnormality.           Patient Management Statements:   All services rendered & documented were deemed medically necessary and appropriate for this patient's condition.  Comprehensive review of prior records, labs, imaging, and diagnostics performed.  Patient assessed for risk of complications that could result in significant morbidity/mortality.      NOTE: This report was transcribed using voice recognition software. Every effort was made to ensure accuracy; however, inadvertent computerized transcription errors may be present.    Electronically signed by Juan Calvillo DO on 3/17/2025 at 2:11 AM

## 2025-03-17 NOTE — ACP (ADVANCE CARE PLANNING)
Advance Care Planning   Healthcare Decision Maker:    Primary Decision Maker: Paul Garner E - [facility] - [phone]

## 2025-03-17 NOTE — CARE COORDINATION
Pt w/sepsis, possible UTI on IV rocephin. CM met w/pt w/role of CM explained. Pt resides w/her niece in a one floor home w/no steps to enter. She is independent w/o the use of any assistive devices, but has a cane and walker if needed. No home O2, cpap or nebulizer. Pt.'s PCP is Dr. Ng and local pharmacy is  in [place]. Pt declines the need for HHC w/family to transport upon discharge. Will follow.   Aiyana Baugh, DORYSN, RN  [facility] Case Management  [phone]

## 2025-03-18 LAB
ANION GAP SERPL CALCULATED.3IONS-SCNC: 14 MMOL/L (ref 7–16)
BASOPHILS # BLD: 0.01 K/UL (ref 0–0.2)
BASOPHILS NFR BLD: 0 % (ref 0–2)
BUN SERPL-MCNC: 25 MG/DL (ref 6–23)
CALCIUM SERPL-MCNC: 8.3 MG/DL (ref 8.6–10.2)
CHLORIDE SERPL-SCNC: 104 MMOL/L (ref 98–107)
CO2 SERPL-SCNC: 18 MMOL/L (ref 22–29)
CREAT SERPL-MCNC: 1.3 MG/DL (ref 0.5–1)
EOSINOPHIL # BLD: 0 K/UL (ref 0.05–0.5)
EOSINOPHILS RELATIVE PERCENT: 0 % (ref 0–6)
ERYTHROCYTE [DISTWIDTH] IN BLOOD BY AUTOMATED COUNT: 13.8 % (ref 11.5–15)
GFR, ESTIMATED: 42 ML/MIN/1.73M2
GLUCOSE SERPL-MCNC: 79 MG/DL (ref 74–99)
HCT VFR BLD AUTO: 37.5 % (ref 34–48)
HGB BLD-MCNC: 12 G/DL (ref 11.5–15.5)
IMM GRANULOCYTES # BLD AUTO: <0.03 K/UL (ref 0–0.58)
IMM GRANULOCYTES NFR BLD: 0 % (ref 0–5)
LYMPHOCYTES NFR BLD: 0.22 K/UL (ref 1.5–4)
LYMPHOCYTES RELATIVE PERCENT: 7 % (ref 20–42)
MAGNESIUM SERPL-MCNC: 1.6 MG/DL (ref 1.6–2.6)
MCH RBC QN AUTO: 30.1 PG (ref 26–35)
MCHC RBC AUTO-ENTMCNC: 32 G/DL (ref 32–34.5)
MCV RBC AUTO: 94 FL (ref 80–99.9)
MONOCYTES NFR BLD: 0.23 K/UL (ref 0.1–0.95)
MONOCYTES NFR BLD: 7 % (ref 2–12)
NEUTROPHILS NFR BLD: 86 % (ref 43–80)
NEUTS SEG NFR BLD: 2.77 K/UL (ref 1.8–7.3)
PLATELET CONFIRMATION: NORMAL
PLATELET, FLUORESCENCE: 51 K/UL (ref 130–450)
PMV BLD AUTO: 11.7 FL (ref 7–12)
POTASSIUM SERPL-SCNC: 3.5 MMOL/L (ref 3.5–5)
RBC # BLD AUTO: 3.99 M/UL (ref 3.5–5.5)
RBC # BLD: ABNORMAL 10*6/UL
SODIUM SERPL-SCNC: 136 MMOL/L (ref 132–146)
WBC OTHER # BLD: 3.2 K/UL (ref 4.5–11.5)

## 2025-03-18 PROCEDURE — 83735 ASSAY OF MAGNESIUM: CPT

## 2025-03-18 PROCEDURE — 99233 SBSQ HOSP IP/OBS HIGH 50: CPT | Performed by: STUDENT IN AN ORGANIZED HEALTH CARE EDUCATION/TRAINING PROGRAM

## 2025-03-18 PROCEDURE — 85025 COMPLETE CBC W/AUTO DIFF WBC: CPT

## 2025-03-18 PROCEDURE — 36415 COLL VENOUS BLD VENIPUNCTURE: CPT

## 2025-03-18 PROCEDURE — 6370000000 HC RX 637 (ALT 250 FOR IP): Performed by: HOSPITALIST

## 2025-03-18 PROCEDURE — 6360000002 HC RX W HCPCS: Performed by: HOSPITALIST

## 2025-03-18 PROCEDURE — 80048 BASIC METABOLIC PNL TOTAL CA: CPT

## 2025-03-18 PROCEDURE — 97161 PT EVAL LOW COMPLEX 20 MIN: CPT

## 2025-03-18 PROCEDURE — 2060000000 HC ICU INTERMEDIATE R&B

## 2025-03-18 PROCEDURE — 2500000003 HC RX 250 WO HCPCS: Performed by: INTERNAL MEDICINE

## 2025-03-18 PROCEDURE — 2580000003 HC RX 258: Performed by: INTERNAL MEDICINE

## 2025-03-18 PROCEDURE — 2500000003 HC RX 250 WO HCPCS: Performed by: HOSPITALIST

## 2025-03-18 PROCEDURE — 97165 OT EVAL LOW COMPLEX 30 MIN: CPT

## 2025-03-18 RX ADMIN — WATER 2000 MG: 1 INJECTION INTRAMUSCULAR; INTRAVENOUS; SUBCUTANEOUS at 20:04

## 2025-03-18 RX ADMIN — SODIUM CHLORIDE, PRESERVATIVE FREE 10 ML: 5 INJECTION INTRAVENOUS at 20:05

## 2025-03-18 RX ADMIN — SODIUM BICARBONATE: 84 INJECTION, SOLUTION INTRAVENOUS at 22:37

## 2025-03-18 RX ADMIN — ACETAMINOPHEN 650 MG: 325 TABLET ORAL at 08:19

## 2025-03-18 RX ADMIN — ACETAMINOPHEN 650 MG: 325 TABLET ORAL at 22:34

## 2025-03-18 RX ADMIN — ROSUVASTATIN CALCIUM 10 MG: 10 TABLET, FILM COATED ORAL at 20:03

## 2025-03-18 RX ADMIN — LEVOTHYROXINE SODIUM 25 MCG: 25 TABLET ORAL at 05:28

## 2025-03-18 RX ADMIN — METOPROLOL SUCCINATE 50 MG: 50 TABLET, FILM COATED, EXTENDED RELEASE ORAL at 08:19

## 2025-03-18 RX ADMIN — SODIUM BICARBONATE: 84 INJECTION, SOLUTION INTRAVENOUS at 10:50

## 2025-03-18 RX ADMIN — SODIUM CHLORIDE, PRESERVATIVE FREE 10 ML: 5 INJECTION INTRAVENOUS at 09:00

## 2025-03-18 RX ADMIN — ACETAMINOPHEN 650 MG: 325 TABLET ORAL at 15:44

## 2025-03-18 RX ADMIN — ASPIRIN 325 MG: 325 TABLET, COATED ORAL at 08:23

## 2025-03-18 ASSESSMENT — ENCOUNTER SYMPTOMS
RHINORRHEA: 0
TROUBLE SWALLOWING: 0
SINUS PRESSURE: 0
VOICE CHANGE: 0
WHEEZING: 0
STRIDOR: 0
COUGH: 0
COLOR CHANGE: 0
SINUS PAIN: 0
SORE THROAT: 0
CHOKING: 0
GASTROINTESTINAL NEGATIVE: 1

## 2025-03-18 ASSESSMENT — PAIN DESCRIPTION - ORIENTATION: ORIENTATION: RIGHT

## 2025-03-18 ASSESSMENT — PAIN DESCRIPTION - LOCATION
LOCATION: BACK
LOCATION: KNEE

## 2025-03-18 ASSESSMENT — PAIN SCALES - GENERAL
PAINLEVEL_OUTOF10: 1
PAINLEVEL_OUTOF10: 6
PAINLEVEL_OUTOF10: 5

## 2025-03-18 ASSESSMENT — PAIN DESCRIPTION - DESCRIPTORS
DESCRIPTORS: DISCOMFORT
DESCRIPTORS: ACHING

## 2025-03-18 NOTE — PLAN OF CARE
Problem: Safety - Adult  Goal: Free from fall injury  3/17/2025 2101 by Marta Taylor RN  Outcome: Progressing  Flowsheets (Taken 3/17/2025 1945)  Free From Fall Injury: Instruct family/caregiver on patient safety  3/17/2025 1053 by Alejandra Newton RN  Outcome: Progressing  Flowsheets (Taken 3/17/2025 0830)  Free From Fall Injury: Instruct family/caregiver on patient safety     Problem: Pain  Goal: Verbalizes/displays adequate comfort level or baseline comfort level  3/17/2025 2101 by Marta Taylor RN  Outcome: Progressing  3/17/2025 1053 by Alejandra Newton RN  Outcome: Progressing  Flowsheets (Taken 3/17/2025 0830)  Verbalizes/displays adequate comfort level or baseline comfort level: Encourage patient to monitor pain and request assistance     Problem: Discharge Planning  Goal: Discharge to home or other facility with appropriate resources  3/17/2025 2101 by Marta Taylor RN  Outcome: Progressing  Flowsheets (Taken 3/17/2025 1945)  Discharge to home or other facility with appropriate resources:   Identify barriers to discharge with patient and caregiver   Identify discharge learning needs (meds, wound care, etc)   Refer to discharge planning if patient needs post-hospital services based on physician order or complex needs related to functional status, cognitive ability or social support system  3/17/2025 1053 by Alejandra Newton RN  Outcome: Progressing  Flowsheets (Taken 3/17/2025 0830)  Discharge to home or other facility with appropriate resources: Identify barriers to discharge with patient and caregiver

## 2025-03-18 NOTE — PLAN OF CARE
Problem: Safety - Adult  Goal: Free from fall injury  3/18/2025 0930 by Alejandra Newton RN  Outcome: Progressing  Flowsheets (Taken 3/18/2025 0800)  Free From Fall Injury: Instruct family/caregiver on patient safety  3/17/2025 2101 by Marta Taylor RN  Outcome: Progressing  Flowsheets (Taken 3/17/2025 1945)  Free From Fall Injury: Instruct family/caregiver on patient safety     Problem: Pain  Goal: Verbalizes/displays adequate comfort level or baseline comfort level  3/18/2025 0930 by Alejandra Newton RN  Outcome: Progressing  Flowsheets (Taken 3/18/2025 0800)  Verbalizes/displays adequate comfort level or baseline comfort level: Encourage patient to monitor pain and request assistance  3/17/2025 2101 by Marta Taylor RN  Outcome: Progressing     Problem: Discharge Planning  Goal: Discharge to home or other facility with appropriate resources  3/18/2025 0930 by Alejandra Newton RN  Outcome: Progressing  Flowsheets (Taken 3/18/2025 0800)  Discharge to home or other facility with appropriate resources: Identify barriers to discharge with patient and caregiver  3/17/2025 2101 by Marta Taylor RN  Outcome: Progressing  Flowsheets (Taken 3/17/2025 1945)  Discharge to home or other facility with appropriate resources:   Identify barriers to discharge with patient and caregiver   Identify discharge learning needs (meds, wound care, etc)   Refer to discharge planning if patient needs post-hospital services based on physician order or complex needs related to functional status, cognitive ability or social support system

## 2025-03-18 NOTE — CONSULTS
[facility]    MD ANDREW Alvarez MD .  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, low CD8 count, Recurrent UTIs, pancytopenia & CAD presented to [place] ED for dizziness     She is being admitted with a diagnosis of sepsis in the context of complicated UTI and immunocompromised host    She is seen in her room she is lying flat she is on room air she appears euvolemic vitals are stable        Past Medical History:   Diagnosis Date    CAD (coronary artery disease)     Colon polyps     Osteoarthritis     Thyroid disease        Past Surgical History:   Procedure Laterality Date    ABDOMEN SURGERY      BRONCHOSCOPY N/A 6/2/2022    BRONCHOSCOPY ALVEOLAR LAVAGE performed by Adams Valentino MD at [facility]    BRONCHOSCOPY N/A 6/2/2022    BRONCHOSCOPY DIAGNOSTIC OR CELL WASH ONLY performed by Adams Valentino MD at [facility]    BRONCHOSCOPY N/A 6/2/2022    BRONCHOSCOPY BIOPSY BRONCHUS performed by Adams Valentino MD at [facility]COPY    BRONCHOSCOPY N/A 7/31/2023    BRONCHOSCOPY ALVEOLAR LAVAGE performed by Adams Valentino MD at [facility]COPY    CARDIAC SURGERY      COLONOSCOPY      COLONOSCOPY N/A 7/23/2020    COLORECTAL CANCER SCREENING, NOT HIGH RISK performed by Vijay Lawson MD at [facility]    CORONARY ANGIOPLASTY WITH STENT PLACEMENT  06/03/2016    [facility]       Family History   Problem Relation Age of Onset    Cancer Mother         Pancreas    Heart Disease Father     Heart Disease Sister     Stroke Brother     Other Brother         Alcohol    Heart Disease Sister         reports that she has never smoked. She has never used 
Facial nerve symmetric and intact. House Brackmann 1/6, bilaterally.       LABS:  CBC  Recent Labs     03/17/25  1023 03/18/25  0432   WBC 5.5 3.2*   HGB 10.5* 12.0   HCT 32.4* 37.5   PLT 58*  --        RADIOLOGY  No results found.      ASSESSMENT:  79 y.o. female with dizziness which has since resolved likely unrelated to inner ear.  Symptoms likely associated with febrile illness.    PLAN:  Imaging and available labs reviewed.  Leukopenia noted today.  Total calcium mildly low.  Creatinine mildly elevated.  Recommend obtaining orthostatic blood pressures to rule out orthostatic hypotension.  Continue current medical care.  Avoid vestibular suppressants as able.  For acute dizziness which does not resolve may use 2 mg of Valium every 8 hours as needed.  Follow-up outpatient for further ENT evaluation and audiologic testing.    Case discussed with attending.    Electronically signed by Lucas Martinez DO on 3/18/25 at 10:10 AM EDT   
communicated through the electronic health record.    Karolina Silveira, APRN - CNP  10:45 AM  3/17/2025    Patient seen and examined. I had a face to face encounter with the patient. Agree with exam.  Assessment and plan as outlined above and directed by me. Addition and corrections were done as deemed appropriate. My exam and plan include: The patient is known to the ID service.  He presented to [place] ED with dizziness which was mostly positional, associated to generalized malaise.  She did have a fever on presentation.  The urinalysis showed pyuria and she was appropriately treated with Ceftriaxone.  She had been following with Dr. Reid as an outpatient.  She had a CTA that shows the same pneumatocele with some thickening and it has not changed since last year.  She will continue treatment with Ceftriaxone for the possibility of complicated UTI.  A respiratory panel has been ordered.  She will be having blood work as an outpatient before she sees Dr. Reid at the end of April.  We will follow-up with you.    James Goldstein MD  3/17/2025  3:10 PM

## 2025-03-19 ENCOUNTER — RESULTS FOLLOW-UP (OUTPATIENT)
Dept: EMERGENCY DEPT | Age: 80
End: 2025-03-19

## 2025-03-19 PROBLEM — R42 DIZZINESS: Status: ACTIVE | Noted: 2025-03-19

## 2025-03-19 LAB
ANION GAP SERPL CALCULATED.3IONS-SCNC: 13 MMOL/L (ref 7–16)
BASOPHILS # BLD: 0 K/UL (ref 0–0.2)
BASOPHILS NFR BLD: 0 % (ref 0–2)
BUN SERPL-MCNC: 23 MG/DL (ref 6–23)
CALCIUM SERPL-MCNC: 8.1 MG/DL (ref 8.6–10.2)
CHLORIDE SERPL-SCNC: 104 MMOL/L (ref 98–107)
CO2 SERPL-SCNC: 19 MMOL/L (ref 22–29)
CREAT SERPL-MCNC: 1.2 MG/DL (ref 0.5–1)
EOSINOPHIL # BLD: 0 K/UL (ref 0.05–0.5)
EOSINOPHILS RELATIVE PERCENT: 0 % (ref 0–6)
ERYTHROCYTE [DISTWIDTH] IN BLOOD BY AUTOMATED COUNT: 13.6 % (ref 11.5–15)
GFR, ESTIMATED: 48 ML/MIN/1.73M2
GLUCOSE SERPL-MCNC: 76 MG/DL (ref 74–99)
HCT VFR BLD AUTO: 31.5 % (ref 34–48)
HGB BLD-MCNC: 10 G/DL (ref 11.5–15.5)
LYMPHOCYTES NFR BLD: 0.13 K/UL (ref 1.5–4)
LYMPHOCYTES RELATIVE PERCENT: 5 % (ref 20–42)
MAGNESIUM SERPL-MCNC: 1.8 MG/DL (ref 1.6–2.6)
MCH RBC QN AUTO: 29.9 PG (ref 26–35)
MCHC RBC AUTO-ENTMCNC: 31.7 G/DL (ref 32–34.5)
MCV RBC AUTO: 94.3 FL (ref 80–99.9)
MICROORGANISM SPEC CULT: ABNORMAL
MONOCYTES NFR BLD: 0.06 K/UL (ref 0.1–0.95)
MONOCYTES NFR BLD: 3 % (ref 2–12)
NEUTROPHILS NFR BLD: 92 % (ref 43–80)
NEUTS SEG NFR BLD: 2.21 K/UL (ref 1.8–7.3)
PLATELET CONFIRMATION: NORMAL
PLATELET, FLUORESCENCE: 42 K/UL (ref 130–450)
PMV BLD AUTO: 11.5 FL (ref 7–12)
POTASSIUM SERPL-SCNC: 3.5 MMOL/L (ref 3.5–5)
RBC # BLD AUTO: 3.34 M/UL (ref 3.5–5.5)
RBC # BLD: ABNORMAL 10*6/UL
SODIUM SERPL-SCNC: 136 MMOL/L (ref 132–146)
SPECIMEN DESCRIPTION: ABNORMAL
WBC OTHER # BLD: 2.4 K/UL (ref 4.5–11.5)

## 2025-03-19 PROCEDURE — 36415 COLL VENOUS BLD VENIPUNCTURE: CPT

## 2025-03-19 PROCEDURE — 6370000000 HC RX 637 (ALT 250 FOR IP): Performed by: HOSPITALIST

## 2025-03-19 PROCEDURE — 80048 BASIC METABOLIC PNL TOTAL CA: CPT

## 2025-03-19 PROCEDURE — 2580000003 HC RX 258: Performed by: INTERNAL MEDICINE

## 2025-03-19 PROCEDURE — 99221 1ST HOSP IP/OBS SF/LOW 40: CPT | Performed by: OTOLARYNGOLOGY

## 2025-03-19 PROCEDURE — 6370000000 HC RX 637 (ALT 250 FOR IP): Performed by: STUDENT IN AN ORGANIZED HEALTH CARE EDUCATION/TRAINING PROGRAM

## 2025-03-19 PROCEDURE — 2060000000 HC ICU INTERMEDIATE R&B

## 2025-03-19 PROCEDURE — 85025 COMPLETE CBC W/AUTO DIFF WBC: CPT

## 2025-03-19 PROCEDURE — 6370000000 HC RX 637 (ALT 250 FOR IP): Performed by: REGISTERED NURSE

## 2025-03-19 PROCEDURE — 2500000003 HC RX 250 WO HCPCS: Performed by: INTERNAL MEDICINE

## 2025-03-19 PROCEDURE — 99233 SBSQ HOSP IP/OBS HIGH 50: CPT | Performed by: STUDENT IN AN ORGANIZED HEALTH CARE EDUCATION/TRAINING PROGRAM

## 2025-03-19 PROCEDURE — 83735 ASSAY OF MAGNESIUM: CPT

## 2025-03-19 PROCEDURE — 2500000003 HC RX 250 WO HCPCS: Performed by: HOSPITALIST

## 2025-03-19 PROCEDURE — 6370000000 HC RX 637 (ALT 250 FOR IP): Performed by: NURSE PRACTITIONER

## 2025-03-19 PROCEDURE — 6360000002 HC RX W HCPCS: Performed by: NURSE PRACTITIONER

## 2025-03-19 RX ORDER — CEFDINIR 300 MG/1
300 CAPSULE ORAL EVERY 12 HOURS SCHEDULED
Status: DISCONTINUED | OUTPATIENT
Start: 2025-03-19 | End: 2025-03-20 | Stop reason: HOSPADM

## 2025-03-19 RX ORDER — CEFDINIR 300 MG/1
300 CAPSULE ORAL EVERY 12 HOURS SCHEDULED
Qty: 10 CAPSULE | Refills: 0 | Status: SHIPPED | OUTPATIENT
Start: 2025-03-19 | End: 2025-03-24

## 2025-03-19 RX ORDER — MAGNESIUM SULFATE 1 G/100ML
1000 INJECTION INTRAVENOUS ONCE
Status: COMPLETED | OUTPATIENT
Start: 2025-03-19 | End: 2025-03-19

## 2025-03-19 RX ORDER — POLYVINYL ALCOHOL 14 MG/ML
1 SOLUTION/ DROPS OPHTHALMIC PRN
Status: DISCONTINUED | OUTPATIENT
Start: 2025-03-19 | End: 2025-03-20 | Stop reason: HOSPADM

## 2025-03-19 RX ADMIN — CEFDINIR 300 MG: 300 CAPSULE ORAL at 11:08

## 2025-03-19 RX ADMIN — SODIUM CHLORIDE, PRESERVATIVE FREE 10 ML: 5 INJECTION INTRAVENOUS at 19:49

## 2025-03-19 RX ADMIN — POLYVINYL ALCOHOL 1 DROP: 1.4 SOLUTION/ DROPS OPHTHALMIC at 21:25

## 2025-03-19 RX ADMIN — CEFDINIR 300 MG: 300 CAPSULE ORAL at 19:47

## 2025-03-19 RX ADMIN — MAGNESIUM SULFATE HEPTAHYDRATE 1000 MG: 1 INJECTION, SOLUTION INTRAVENOUS at 16:17

## 2025-03-19 RX ADMIN — ACETAMINOPHEN 650 MG: 325 TABLET ORAL at 19:47

## 2025-03-19 RX ADMIN — LEVOTHYROXINE SODIUM 25 MCG: 25 TABLET ORAL at 05:17

## 2025-03-19 RX ADMIN — SODIUM CHLORIDE, PRESERVATIVE FREE 10 ML: 5 INJECTION INTRAVENOUS at 08:06

## 2025-03-19 RX ADMIN — METOPROLOL SUCCINATE 50 MG: 50 TABLET, FILM COATED, EXTENDED RELEASE ORAL at 08:06

## 2025-03-19 RX ADMIN — POTASSIUM BICARBONATE 20 MEQ: 782 TABLET, EFFERVESCENT ORAL at 11:45

## 2025-03-19 RX ADMIN — ASPIRIN 325 MG: 325 TABLET, COATED ORAL at 08:06

## 2025-03-19 RX ADMIN — SODIUM BICARBONATE: 84 INJECTION, SOLUTION INTRAVENOUS at 11:11

## 2025-03-19 RX ADMIN — ROSUVASTATIN CALCIUM 10 MG: 10 TABLET, FILM COATED ORAL at 19:47

## 2025-03-19 ASSESSMENT — PAIN DESCRIPTION - LOCATION: LOCATION: ANKLE;KNEE

## 2025-03-19 ASSESSMENT — PAIN DESCRIPTION - ORIENTATION: ORIENTATION: RIGHT;LEFT

## 2025-03-19 ASSESSMENT — PAIN SCALES - GENERAL
PAINLEVEL_OUTOF10: 0
PAINLEVEL_OUTOF10: 4
PAINLEVEL_OUTOF10: 0

## 2025-03-19 ASSESSMENT — PAIN DESCRIPTION - DESCRIPTORS: DESCRIPTORS: DISCOMFORT;SORE;ACHING

## 2025-03-19 NOTE — PLAN OF CARE
Problem: Safety - Adult  Goal: Free from fall injury  3/18/2025 2115 by Marta Taylor RN  Outcome: Progressing  Flowsheets (Taken 3/18/2025 1945)  Free From Fall Injury: Instruct family/caregiver on patient safety  3/18/2025 0930 by Alejandra Newton RN  Outcome: Progressing  Flowsheets (Taken 3/18/2025 0800)  Free From Fall Injury: Instruct family/caregiver on patient safety     Problem: Pain  Goal: Verbalizes/displays adequate comfort level or baseline comfort level  3/18/2025 2115 by Marta Taylor RN  Outcome: Progressing  3/18/2025 0930 by Alejandra Newton RN  Outcome: Progressing  Flowsheets (Taken 3/18/2025 0800)  Verbalizes/displays adequate comfort level or baseline comfort level: Encourage patient to monitor pain and request assistance     Problem: Discharge Planning  Goal: Discharge to home or other facility with appropriate resources  3/18/2025 2115 by Marta Taylor RN  Outcome: Progressing  3/18/2025 0930 by Alejandra Newton RN  Outcome: Progressing  Flowsheets (Taken 3/18/2025 0800)  Discharge to home or other facility with appropriate resources: Identify barriers to discharge with patient and caregiver

## 2025-03-19 NOTE — PLAN OF CARE
Problem: Safety - Adult  Goal: Free from fall injury  3/19/2025 1018 by Alejandra Newton RN  Outcome: Progressing  Flowsheets (Taken 3/19/2025 0800)  Free From Fall Injury: Instruct family/caregiver on patient safety  3/18/2025 2115 by Marta Taylor RN  Outcome: Progressing  Flowsheets (Taken 3/18/2025 1945)  Free From Fall Injury: Instruct family/caregiver on patient safety     Problem: Pain  Goal: Verbalizes/displays adequate comfort level or baseline comfort level  3/19/2025 1018 by Alejandra Newton RN  Outcome: Progressing  Flowsheets (Taken 3/19/2025 0800)  Verbalizes/displays adequate comfort level or baseline comfort level: Encourage patient to monitor pain and request assistance  3/18/2025 2115 by Marta Taylor RN  Outcome: Progressing     Problem: Discharge Planning  Goal: Discharge to home or other facility with appropriate resources  3/19/2025 1018 by Alejandra Newton RN  Outcome: Progressing  Flowsheets (Taken 3/19/2025 0800)  Discharge to home or other facility with appropriate resources: Identify barriers to discharge with patient and caregiver  3/18/2025 2115 by Marta Taylor RN  Outcome: Progressing

## 2025-03-20 VITALS
BODY MASS INDEX: 27.32 KG/M2 | WEIGHT: 170 LBS | DIASTOLIC BLOOD PRESSURE: 56 MMHG | RESPIRATION RATE: 20 BRPM | HEART RATE: 93 BPM | HEIGHT: 66 IN | OXYGEN SATURATION: 92 % | TEMPERATURE: 98.5 F | SYSTOLIC BLOOD PRESSURE: 110 MMHG

## 2025-03-20 LAB
ANION GAP SERPL CALCULATED.3IONS-SCNC: 11 MMOL/L (ref 7–16)
BUN SERPL-MCNC: 17 MG/DL (ref 6–23)
CALCIUM SERPL-MCNC: 7.5 MG/DL (ref 8.6–10.2)
CHLORIDE SERPL-SCNC: 101 MMOL/L (ref 98–107)
CO2 SERPL-SCNC: 25 MMOL/L (ref 22–29)
CREAT SERPL-MCNC: 1 MG/DL (ref 0.5–1)
ERYTHROCYTE [DISTWIDTH] IN BLOOD BY AUTOMATED COUNT: 13.8 % (ref 11.5–15)
GFR, ESTIMATED: 55 ML/MIN/1.73M2
GLUCOSE SERPL-MCNC: 105 MG/DL (ref 74–99)
HCT VFR BLD AUTO: 27.2 % (ref 34–48)
HGB BLD-MCNC: 8.6 G/DL (ref 11.5–15.5)
MCH RBC QN AUTO: 29.5 PG (ref 26–35)
MCHC RBC AUTO-ENTMCNC: 31.6 G/DL (ref 32–34.5)
MCV RBC AUTO: 93.2 FL (ref 80–99.9)
PLATELET # BLD AUTO: 30 K/UL (ref 130–450)
PLATELET CONFIRMATION: NORMAL
PMV BLD AUTO: 11.7 FL (ref 7–12)
POTASSIUM SERPL-SCNC: 3.1 MMOL/L (ref 3.5–5)
RBC # BLD AUTO: 2.92 M/UL (ref 3.5–5.5)
SODIUM SERPL-SCNC: 137 MMOL/L (ref 132–146)
WBC OTHER # BLD: 2.8 K/UL (ref 4.5–11.5)

## 2025-03-20 PROCEDURE — 6370000000 HC RX 637 (ALT 250 FOR IP): Performed by: HOSPITALIST

## 2025-03-20 PROCEDURE — 2580000003 HC RX 258: Performed by: INTERNAL MEDICINE

## 2025-03-20 PROCEDURE — 2500000003 HC RX 250 WO HCPCS: Performed by: INTERNAL MEDICINE

## 2025-03-20 PROCEDURE — 6370000000 HC RX 637 (ALT 250 FOR IP): Performed by: INTERNAL MEDICINE

## 2025-03-20 PROCEDURE — 36415 COLL VENOUS BLD VENIPUNCTURE: CPT

## 2025-03-20 PROCEDURE — 99239 HOSP IP/OBS DSCHRG MGMT >30: CPT | Performed by: INTERNAL MEDICINE

## 2025-03-20 PROCEDURE — 85027 COMPLETE CBC AUTOMATED: CPT

## 2025-03-20 PROCEDURE — 80048 BASIC METABOLIC PNL TOTAL CA: CPT

## 2025-03-20 PROCEDURE — 6370000000 HC RX 637 (ALT 250 FOR IP): Performed by: REGISTERED NURSE

## 2025-03-20 RX ORDER — POTASSIUM CHLORIDE 1500 MG/1
40 TABLET, EXTENDED RELEASE ORAL ONCE
Status: COMPLETED | OUTPATIENT
Start: 2025-03-20 | End: 2025-03-20

## 2025-03-20 RX ORDER — SODIUM BICARBONATE 650 MG/1
1300 TABLET ORAL 3 TIMES DAILY
Qty: 180 TABLET | Refills: 1 | Status: SHIPPED | OUTPATIENT
Start: 2025-03-20 | End: 2025-05-19

## 2025-03-20 RX ADMIN — METOPROLOL SUCCINATE 50 MG: 50 TABLET, FILM COATED, EXTENDED RELEASE ORAL at 08:18

## 2025-03-20 RX ADMIN — CEFDINIR 300 MG: 300 CAPSULE ORAL at 08:18

## 2025-03-20 RX ADMIN — POTASSIUM CHLORIDE 40 MEQ: 1500 TABLET, EXTENDED RELEASE ORAL at 14:03

## 2025-03-20 RX ADMIN — ACETAMINOPHEN 650 MG: 325 TABLET ORAL at 14:25

## 2025-03-20 RX ADMIN — ASPIRIN 325 MG: 325 TABLET, COATED ORAL at 08:18

## 2025-03-20 RX ADMIN — LEVOTHYROXINE SODIUM 25 MCG: 25 TABLET ORAL at 05:15

## 2025-03-20 RX ADMIN — SODIUM BICARBONATE: 84 INJECTION, SOLUTION INTRAVENOUS at 02:36

## 2025-03-20 RX ADMIN — POTASSIUM CHLORIDE 40 MEQ: 1500 TABLET, EXTENDED RELEASE ORAL at 15:01

## 2025-03-20 ASSESSMENT — PAIN SCALES - GENERAL: PAINLEVEL_OUTOF10: 3

## 2025-03-20 ASSESSMENT — PAIN DESCRIPTION - DESCRIPTORS: DESCRIPTORS: SORE;TIGHTNESS

## 2025-03-20 ASSESSMENT — PAIN DESCRIPTION - LOCATION: LOCATION: KNEE

## 2025-03-20 ASSESSMENT — PAIN DESCRIPTION - ORIENTATION: ORIENTATION: RIGHT

## 2025-03-20 NOTE — DISCHARGE SUMMARY
PO     * CENTRUM SILVER PO     Co Q-10 400 MG Caps     Cranberry 125 MG Tabs     ferrous sulfate 325 (65 Fe) MG tablet  Commonly known as: IRON 325     Glucosamine 500 MG Caps     hydroxychloroquine 200 MG tablet  Commonly known as: PLAQUENIL     levothyroxine 25 MCG tablet  Commonly known as: SYNTHROID     metoprolol succinate 50 MG extended release tablet  Commonly known as: TOPROL XL     predniSONE 10 MG (21) Tbpk     QC Tumeric Complex 500 MG Caps  Generic drug: turmeric     rosuvastatin 10 MG tablet  Commonly known as: CRESTOR  Take 1 tablet by mouth nightly     vitamin D 76431 UNIT Caps  Commonly known as: CHOLECALCIFEROL     ZYRTEC PO           * This list has 2 medication(s) that are the same as other medications prescribed for you. Read the directions carefully, and ask your doctor or other care provider to review them with you.                STOP taking these medications      AMPICILLIN PO     loperamide 2 MG capsule  Commonly known as: IMODIUM               Where to Get Your Medications        These medications were sent to [address] - P [phone] - F [phone]  [address] [place], [place] [address]      Phone: [phone]   cefdinir 300 MG capsule  sodium bicarbonate 650 MG tablet           Note that more than 30 minutes was spent in preparing discharge papers, discussing discharge with patient, medication review, etc.    Signed:  Electronically signed by Sadi Lund MD on 3/20/2025 at 10:58 AM

## 2025-03-20 NOTE — PLAN OF CARE
Problem: Safety - Adult  Goal: Free from fall injury  3/19/2025 2009 by Laurie Linares RN  Outcome: Progressing  3/19/2025 1018 by Alejandra Newton RN  Outcome: Progressing  Flowsheets (Taken 3/19/2025 0800)  Free From Fall Injury: Instruct family/caregiver on patient safety     Problem: Pain  Goal: Verbalizes/displays adequate comfort level or baseline comfort level  3/19/2025 2009 by Laurie Linares RN  Outcome: Progressing  3/19/2025 1018 by Alejandra Newton RN  Outcome: Progressing  Flowsheets (Taken 3/19/2025 0800)  Verbalizes/displays adequate comfort level or baseline comfort level: Encourage patient to monitor pain and request assistance     Problem: Discharge Planning  Goal: Discharge to home or other facility with appropriate resources  3/19/2025 2009 by Laurie Linares RN  Outcome: Progressing  3/19/2025 1018 by Alejandra Newton RN  Outcome: Progressing  Flowsheets (Taken 3/19/2025 0800)  Discharge to home or other facility with appropriate resources: Identify barriers to discharge with patient and caregiver

## 2025-03-20 NOTE — CARE COORDINATION
Second IMM copy provided to pt. Pt confirms plan to return home and denies the need for HHC. She would like a BSC and is agreeable to [facility] providing w/Juan notified who will deliver to pt.'s room prior to discharge.  Aiyana Baugh, BSN, RN  [facility] Case Management  [phone]

## 2025-03-21 LAB
MICROORGANISM SPEC CULT: NORMAL
MICROORGANISM SPEC CULT: NORMAL
SERVICE CMNT-IMP: NORMAL
SERVICE CMNT-IMP: NORMAL
SPECIMEN DESCRIPTION: NORMAL
SPECIMEN DESCRIPTION: NORMAL

## 2025-03-21 NOTE — PROGRESS NOTES
[facility] Hospitalist Progress Note    Admitting Date and Time: 3/17/2025  3:24 AM  Admit Dx: Sepsis (HCC) [A41.9]    Subjective:  Patient is being followed for Sepsis (HCC) [A41.9]   Patient was seen and examined    ROS: denies fever, chills, cp, sob, n/v, HA unless stated above.      levothyroxine  25 mcg Oral QAM AC    metoprolol succinate  50 mg Oral Daily    rosuvastatin  10 mg Oral Nightly    aspirin  325 mg Oral Daily    sodium chloride flush  5-40 mL IntraVENous 2 times per day    enoxaparin  40 mg SubCUTAneous Daily    cefTRIAXone (ROCEPHIN) IV  2,000 mg IntraVENous Q24H     [Held by provider] predniSONE, 5 mg, Daily PRN  sodium chloride flush, 5-40 mL, PRN  sodium chloride, , PRN  ondansetron, 4 mg, Q8H PRN   Or  ondansetron, 4 mg, Q6H PRN  melatonin, 3 mg, Nightly PRN  polyethylene glycol, 17 g, Daily PRN  senna, 1 tablet, Daily PRN  acetaminophen, 650 mg, Q6H PRN   Or  acetaminophen, 650 mg, Q6H PRN         Objective:    BP (!) 104/49   Pulse 96   Temp 99 °F (37.2 °C) (Oral)   Resp 14   Ht 1.676 m (5' 6\")   Wt 76.5 kg (168 lb 10.4 oz)   SpO2 96%   BMI 27.22 kg/m²     General Appearance: alert and oriented to person, place and time and in no acute distress  Skin: warm and dry  Head: normocephalic and atraumatic  Eyes: pupils equal, round, and reactive to light, extraocular eye movements intact, conjunctivae normal  Neck: neck supple and non tender without mass   Pulmonary/Chest: clear to auscultation bilaterally- no wheezes, rales or rhonchi, normal air movement, no respiratory distress  Cardiovascular: normal rate, normal S1 and S2 and no carotid bruits  Abdomen: soft, non-tender, non-distended, normal bowel sounds, no masses or organomegaly  Extremities: no cyanosis, no clubbing and no edema  Neurologic: no cranial nerve deficit and speech normal        Recent Labs     03/16/25  1731 03/17/25  1023 03/18/25  0432    136 136   K 4.0 3.7 3.5    106 104   CO2 18* 16* 18*   BUN 
   [facility]  [facility]  Progress Note    SUBJECTIVE:  No chief complaint on file.    Patient is tolerating medications. No reported adverse drug reactions.  Denies any new complaints.  Hoping to go home today  Feeling well  No fevers    Review of systems:  As stated above in the chief complaint, otherwise negative.    Medications:  Scheduled Meds:   cefdinir  300 mg Oral 2 times per day    potassium bicarb-citric acid  20 mEq Oral Once    levothyroxine  25 mcg Oral QAM AC    metoprolol succinate  50 mg Oral Daily    rosuvastatin  10 mg Oral Nightly    aspirin  325 mg Oral Daily    sodium chloride flush  5-40 mL IntraVENous 2 times per day    enoxaparin  40 mg SubCUTAneous Daily     Continuous Infusions:   sodium chloride      sodium bicarbonate 75 mEq in sodium chloride 0.45 % 1,000 mL infusion 100 mL/hr at 25 1111     PRN Meds:polyvinyl alcohol, [Held by provider] predniSONE, sodium chloride flush, sodium chloride, ondansetron **OR** ondansetron, melatonin, polyethylene glycol, senna, acetaminophen **OR** acetaminophen    OBJECTIVE:  BP (!) 122/50   Pulse 98   Temp 99.5 °F (37.5 °C) (Oral)   Resp 18   Ht 1.676 m (5' 6\")   Wt 76.5 kg (168 lb 10.4 oz)   SpO2 98%   BMI 27.22 kg/m²   Temp  Av.8 °F (37.1 °C)  Min: 98.1 °F (36.7 °C)  Max: 99.5 °F (37.5 °C)  Constitutional: The patient is awake, alert, and oriented.  Ambulating in the room.  In no distress.  Skin: Warm and dry. No rashes were noted.   HEENT: Round and reactive pupils.  Moist mucous membranes.  No ulcerations or thrush.  Neck: Supple to movements.   Chest: No respiratory distress.  Symmetrical expansion.  No wheezing, crackles or rhonchi.  Cardiovascular: Heart sounds rhythmic and regular. No murmurs appreciated.   Abdomen: Positive bowel sounds to auscultation. Benign to palpation. No masses felt.  Extremities: No edema.  Lines: Peripheral.    Laboratory and Tests:  Lab Results   Component Value Date    
  [facility] Quality Flow/Interdisciplinary Rounds Progress Note        Quality Flow Rounds held on March 20, 2025    Disciplines Attending:  Bedside Nurse, , , and Nursing Unit Leadership    Myrna Giron was admitted on 3/17/2025  3:24 AM    Anticipated Discharge Date:  Expected Discharge Date: 03/19/25    Disposition:    Marcelino Score:  Marcelino Scale Score: 21    [facility] RISK OF UNPLANNED READMISSION 2.0             12.3 Total Score        Discussed patient goal for the day, patient clinical progression, and barriers to discharge.  The following Goal(s) of the Day/Commitment(s) have been identified:   iv abx, bicarb gtt, check for dc       Lucy Mayers RN  March 20, 2025        
  [facility] Quality Flow/Interdisciplinary Rounds Progress Note        Quality Flow Rounds held on March 17, 2025    Disciplines Attending:  Bedside Nurse, , , and Nursing Unit Leadership    Myrna Giron was admitted on 3/17/2025  3:24 AM    Anticipated Discharge Date:       Disposition:    Marcelino Score:  Marcelino Scale Score: 21    [facility] RISK OF UNPLANNED READMISSION 2.0             10.5 Total Score        Discussed patient goal for the day, patient clinical progression, and barriers to discharge.  The following Goal(s) of the Day/Commitment(s) have been identified:   discharge planning, ID, IV fluids, IV ABX      Mak Amador RN  March 17, 2025        
  [facility] Quality Flow/Interdisciplinary Rounds Progress Note        Quality Flow Rounds held on March 18, 2025    Disciplines Attending:  Bedside Nurse, , , and Nursing Unit Leadership    Myrna Giron was admitted on 3/17/2025  3:24 AM    Anticipated Discharge Date:       Disposition:    Marcelino Score:  Marcelino Scale Score: 19    [facility] RISK OF UNPLANNED READMISSION 2.0             12.2 Total Score        Discussed patient goal for the day, patient clinical progression, and barriers to discharge.  The following Goal(s) of the Day/Commitment(s) have been identified:   discharge planning, ID, IV fluids, IV ABX, PT/OT, febrile      Mak Amador RN  March 18, 2025        
  [facility] Quality Flow/Interdisciplinary Rounds Progress Note        Quality Flow Rounds held on March 19, 2025    Disciplines Attending:  Bedside Nurse, , , and Nursing Unit Leadership    Myrna Giron was admitted on 3/17/2025  3:24 AM    Anticipated Discharge Date:  Expected Discharge Date: 03/19/25    Disposition:    Marcelino Score:  Marcelino Scale Score: 21    [facility] RISK OF UNPLANNED READMISSION 2.0             12.4 Total Score        Discussed patient goal for the day, patient clinical progression, and barriers to discharge.  The following Goal(s) of the Day/Commitment(s) have been identified:   iv abx, bicarb gtt, monitor labs, platelets low- hold lovenox.       Lucy Mayers RN  March 19, 2025        
4 Eyes Skin Assessment     NAME:  Myrna Giron  YOB: 1945  MEDICAL RECORD NUMBER:  [id number]    The patient is being assessed for  Admission    I agree that at least one RN has performed a thorough Head to Toe Skin Assessment on the patient. ALL assessment sites listed below have been assessed.      Areas assessed by both nurses:    Head, Face, Ears, Shoulders, Back, Chest, Arms, Elbows, Hands, Sacrum. Buttock, Coccyx, Ischium, and Legs. Feet and Heels        Does the Patient have a Wound? No noted wound(s)       Marcelino Prevention initiated by RN: No  Wound Care Orders initiated by RN: No    Pressure Injury (Stage 3,4, Unstageable, DTI, NWPT, and Complex wounds) if present, place Wound referral order by RN under : No    New Ostomies, if present place, Ostomy referral order under : No     Nurse 1 eSignature: Electronically signed by Braeden Adler RN on 3/17/25 at 6:11 AM EDT    **SHARE this note so that the co-signing nurse can place an eSignature**    Nurse 2 eSignature: Electronically signed by Roland Bryant RN on 3/17/25 at 6:15 AM EDT    
[facility]  MD Mateo Che, MD Vivi Santiago, CNP   Aiyana Corrales, BRIT Kumar, CLARE  Progress Note    3/18/2025    SUBJECTIVE:   3/18: Seen while laying in bed, no acute distress. Continues to have loose bowel movements, though says this is not out of the ordinary for her. PO intake is fair. She denies nausea, vomiting, or diarrhea.     PROBLEM LIST:    Principal Problem:    Severe sepsis (HCC)  Active Problems:    CAD (coronary artery disease)    Essential hypertension    Mixed hyperlipidemia    Hypothyroidism    Seronegative rheumatoid arthritis (HCC)    Insomnia    Overactive bladder    Recurrent urinary tract infection    Selective deficiency of IgG (HCC)    JUNIE (acute kidney injury)    Thrombocytopenia    Sepsis (HCC)  Resolved Problems:    * No resolved hospital problems. *         DIET:    ADULT DIET; Regular; Low Fat/Low Chol/High Fiber/2 gm Na  ADULT ORAL NUTRITION SUPPLEMENT; Breakfast, Dinner; Low Calorie/High Protein Oral Supplement     MEDS (scheduled):    levothyroxine  25 mcg Oral QAM AC    metoprolol succinate  50 mg Oral Daily    rosuvastatin  10 mg Oral Nightly    aspirin  325 mg Oral Daily    sodium chloride flush  5-40 mL IntraVENous 2 times per day    enoxaparin  40 mg SubCUTAneous Daily    cefTRIAXone (ROCEPHIN) IV  2,000 mg IntraVENous Q24H       MEDS (infusions):   sodium chloride      sodium bicarbonate 75 mEq in sodium chloride 0.45 % 1,000 mL infusion 100 mL/hr at 03/18/25 1050       MEDS (prn):  [Held by provider] predniSONE, sodium chloride flush, sodium chloride, ondansetron **OR** ondansetron, melatonin, polyethylene glycol, senna, acetaminophen **OR** acetaminophen    PHYSICAL EXAM:     Patient Vitals for the past 24 hrs:   BP Temp Temp src Pulse Resp SpO2 Weight   03/18/25 1507 (!) 105/46 99 °F (37.2 °C) Oral 93 18 98 % --   03/18/25 1105 (!) 104/49 99 °F (37.2 °C) Oral 96 14 96 % --   03/18/25 0800 (!) 124/54 
CLINICAL PHARMACY NOTE: MEDS TO BEDS    Total # of Prescriptions Filled: 1   The following medications were delivered to the patient:  Cefdinir 30 mg     Additional Documentation:    
CLINICAL PHARMACY NOTE: MEDS TO BEDS    Total # of Prescriptions Filled: 1   The following medications were delivered to the patient:  SODIUM BICARBONATE 650 MG    Additional Documentation:    
Consult completed to Infectious Disease via phone call to office - per office Dr. Goldstein will see patient    Breanna Levy - unit secretary  
Consult completed to Nephrology via perfect serve text message    Breanna Levy - unit secretary  
Notified Dr. Noel regarding pt temp of 102.1.    
Spiritual Health History and Assessment/Progress Note  [facility]    Initial Encounter,  ,  ,      Name: Myrna Giron MRN: [id number]    Age: 79 y.o.     Sex: female   Language: English   Yazdanism: [employer]   Severe sepsis (HCC)     Date: 3/20/2025                           Spiritual Assessment began in SEB 6S INTERMEDIATE        Referral/Consult From: Rounding   Encounter Overview/Reason: Initial Encounter  Service Provided For: Patient    Holly, Belief, Meaning:   Patient identifies as spiritual, is connected with a holly tradition or spiritual practice, and has beliefs or practices that help with coping during difficult times  Family/Friends No family/friends present      Importance and Influence:  Patient has spiritual/personal beliefs that influence decisions regarding their health  Family/Friends No family/friends present    Community:  Patient is connected with a spiritual community and feels well-supported. Support system includes: Friends and Extended family  Family/Friends No family/friends present    Assessment and Plan of Care:     Patient Interventions include: Facilitated expression of thoughts and feelings, Affirmed coping skills/support systems, and Provided sacramental/Baptist ritual  Family/Friends Interventions include: No family/friends present    Patient Plan of Care: Spiritual Care available upon further referral  Family/Friends Plan of Care: No family/friends present    Electronically signed by Chaplain Pablo on 3/20/2025 at 3:19 PM    
   104 104   CO2 16* 18* 19*   BUN 27* 25* 23   CREATININE 1.3* 1.3* 1.2*   GLUCOSE 131* 79 76   CALCIUM 8.0* 8.3* 8.1*       Recent Labs     03/16/25  1731 03/17/25  1023 03/18/25  0432 03/19/25  0507   WBC 8.2 5.5 3.2* 2.4*   RBC 4.36 3.53 3.99 3.34*   HGB 12.7 10.5* 12.0 10.0*   HCT 39.2 32.4* 37.5 31.5*   MCV 89.9 91.8 94.0 94.3   MCH 29.1 29.7 30.1 29.9   MCHC 32.4 32.4 32.0 31.7*   RDW 13.2 13.5 13.8 13.6   PLT 71* 58*  --   --    MPV 10.7 11.5 11.7 11.5       Radiology:     Assessment:    Principal Problem:    Severe sepsis (HCC)  Active Problems:    CAD (coronary artery disease)    Essential hypertension    Mixed hyperlipidemia    Hypothyroidism    Seronegative rheumatoid arthritis (HCC)    Insomnia    Overactive bladder    Recurrent urinary tract infection    Selective deficiency of IgG (HCC)    JUNIE (acute kidney injury)    Thrombocytopenia    Sepsis (HCC)    Dizziness  Resolved Problems:    * No resolved hospital problems. *        79-year-old female admitted for sepsis secondary to UTI continue Rocephin ID consulted follow urine and blood culture  Patient was started with cefdinir for 5 more days per ID recommendation      JUNIE on CKD secondary to dehydration nephro consulted continue IV fluid      Hypertension continue home medication      Chronic pancytopenia.  Needs outpatient f/u.    Avoid Lovenox.      JUNIE slowly improving UTI still has a lot of urinary frequency  Anticipate discharge if she continued to improve clinically in the next 24 to 40 hours  NOTE: This report was transcribed using voice recognition software. Every effort was made to ensure accuracy; however, inadvertent computerized transcription errors may be present.  Electronically signed by Sosa Vila DO on 3/19/2025 at 2:09 PM    
Peripheral.    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 I had a face to face encounter with the patient. Agree with exam.  Assessment and plan as outlined above and directed by me. Addition and corrections were done as deemed appropriate. My exam and plan include: The patient is doing well with IV antibiotic.  She wants to go home.  Awaiting for final sensitivities to hopefully change to an oral antibiotic.    James Goldstein MD  3/18/2025  2:11 PM    
arrival patient lying in bed .  At end of session, patient returned to bed  with call light and phone within reach, all lines and tubes intact.         Patient / Family Goal: return home       Eval Complexity: Low    Time In: 0825  Time Out: 0835      Min Units   OT Eval Low 97165 x  1   OT Eval Medium 92640      OT Eval High 19423      OT Re-Eval 73979       Therapeutic Ex 07726      Therapeutic Activities 92129       ADL/Self Care 76724      Orthotic Management 29805       Manual 80433     Neuro Re-Ed 44223       Non-Billable Time      OT Re eval 00386        Evaluation Time additionally includes thorough review of current medical information, gathering information on past medical history/social history and prior level of function, interpretation of standardized testing/informal observation of tasks, assessment of data and development of plan of care and goals.            Annabel Michaud  OTR/L  OT [id number]                           
edema.  Lines: Peripheral.    Laboratory and Tests:  Lab Results   Component Value Date    .0 (H) 03/17/2025    CRP 12.0 (H) 10/26/2023    .0 (H) 07/27/2023     Lab Results   Component Value Date    SEDRATE 32 (H) 03/17/2025    SEDRATE 68 (H) 10/26/2023    SEDRATE 106 (H) 07/27/2023       Radiology:  Reviewed    Microbiology:   Respiratory viral panel: Negative  Blood cultures 3/16/2025: Negative so far  Urine culture 3/16/2025: Klebsiella pneumoniae (resistant to Ampicillin & Bactrim Intermediate to Nitrofurantoin and Cefazolin)    Recent Labs     03/17/25  1023   PROCAL 1.17*       ASSESSMENT:  Possible urinary tract infection  Fevers, improved  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  D/W Dr Chicho Silveira, APRN - CNP  10:12 AM  3/20/2025  Pt seen and examined. Above discussed agree with advanced practice nurse. Labs, cultures, and radiographs reviewed.  Face to Face encounter occurred. Changes made as necessary.     Adams Reid MD    
thorough review of current medical information, gathering information on past medical history/social history and prior level of function, completion of standardized testing/informal observation of tasks, assessment of data and education on plan of care and goals.      CPT codes:  [x] Low Complexity PT evaluation 96476  [] Moderate Complexity PT evaluation 95552  [] High Complexity PT evaluation 91898  [] PT Re-evaluation 14549  [] Gait training 45629 minutes  [] Manual therapy 59248 minutes  [] Therapeutic activities 27694 minutes  [] Therapeutic exercises 14078 minutes  [] Neuromuscular reeducation 32405 minutes     Nicole Baer PT [id number]  
kidney disease stage III baseline creatinine 1.1-1.3     -Metabolic acidosis normal anion gap seems to be chronic as she has a tendency to have low bicarb for the last few years :   likely due to CKD, possible component of renal tubular acidosis as patient is not able to acidify her urine along with diarrhea.   Lactic acid is normal  Vital stable     -Complicated UTI: ID on board     -Immunocompromised host     -History of rheumatoid arthritis    Acidosis improving - 18-->19  Kidney function is at her baseline  - 1.2 mg/dl  Mg- 1.8  K-3.5    Plan   -Ok for discharge  -Sodium bicarbonate po 1300 mg tid  -BMP in 1 week- results to Dr. Logan's office  -Follow-up with Dr. Logan in 1-2 weeks in the office.   -    Electronically signed by LESTER BARKER CNP on 3/19/2025 at 3:41 PM    NOTE: This report was transcribed using voice recognition software. Every effort was made to ensure accuracy; however, inadvertent transcription errors may be present.

## 2025-05-30 ENCOUNTER — OFFICE VISIT (OUTPATIENT)
Age: 80
End: 2025-05-30
Payer: MEDICARE

## 2025-05-30 VITALS
TEMPERATURE: 97.4 F | DIASTOLIC BLOOD PRESSURE: 62 MMHG | RESPIRATION RATE: 16 BRPM | HEART RATE: 80 BPM | SYSTOLIC BLOOD PRESSURE: 125 MMHG | OXYGEN SATURATION: 99 %

## 2025-05-30 DIAGNOSIS — J98.4 PNEUMATOCELE OF LUNG: ICD-10-CM

## 2025-05-30 DIAGNOSIS — Z87.39 HISTORY OF RHEUMATOID ARTHRITIS: ICD-10-CM

## 2025-05-30 DIAGNOSIS — J90 PLEURAL EFFUSION: Primary | ICD-10-CM

## 2025-05-30 DIAGNOSIS — Z86.79 HISTORY OF CORONARY ARTERY DISEASE: ICD-10-CM

## 2025-05-30 DIAGNOSIS — Z86.19 HISTORY OF ASPERGILLOMA: ICD-10-CM

## 2025-05-30 DIAGNOSIS — J98.4 CAVITARY LESION OF LUNG: ICD-10-CM

## 2025-05-30 PROCEDURE — G8427 DOCREV CUR MEDS BY ELIG CLIN: HCPCS | Performed by: INTERNAL MEDICINE

## 2025-05-30 PROCEDURE — 99212 OFFICE O/P EST SF 10 MIN: CPT | Performed by: INTERNAL MEDICINE

## 2025-05-30 PROCEDURE — 99213 OFFICE O/P EST LOW 20 MIN: CPT | Performed by: INTERNAL MEDICINE

## 2025-05-30 PROCEDURE — 1123F ACP DISCUSS/DSCN MKR DOCD: CPT | Performed by: INTERNAL MEDICINE

## 2025-05-30 PROCEDURE — 1090F PRES/ABSN URINE INCON ASSESS: CPT | Performed by: INTERNAL MEDICINE

## 2025-05-30 PROCEDURE — 3078F DIAST BP <80 MM HG: CPT | Performed by: INTERNAL MEDICINE

## 2025-05-30 PROCEDURE — 1036F TOBACCO NON-USER: CPT | Performed by: INTERNAL MEDICINE

## 2025-05-30 PROCEDURE — 3074F SYST BP LT 130 MM HG: CPT | Performed by: INTERNAL MEDICINE

## 2025-05-30 PROCEDURE — G8419 CALC BMI OUT NRM PARAM NOF/U: HCPCS | Performed by: INTERNAL MEDICINE

## 2025-05-30 PROCEDURE — G8400 PT W/DXA NO RESULTS DOC: HCPCS | Performed by: INTERNAL MEDICINE

## 2025-05-30 PROCEDURE — 1159F MED LIST DOCD IN RCRD: CPT | Performed by: INTERNAL MEDICINE

## 2025-05-30 RX ORDER — TRIMETHOPRIM 100 MG/1
100 TABLET ORAL NIGHTLY
COMMUNITY
Start: 2025-05-03

## 2025-05-30 NOTE — PROGRESS NOTES
[facility]  PULMONARY/CRITICAL CARE PROGRESS NOTE    Patient: Myrna Giron  MRN: [id number]  : 1945    Encounter Date: 2025  Encounter Time: 2:55 PM     Reason for Follow Up: Pleural Effusion     Problem List:  Patient Active Problem List   Diagnosis    CAD (coronary artery disease)    Essential hypertension    Mixed hyperlipidemia    Loculated pleural effusion    Absolute anemia    Pancytopenia (HCC)    Allergic bronchopulmonary aspergillosis (HCC)    Atrophic vaginitis    Colitis due to Clostridioides difficile    Hypogammaglobulinemia    Hypothyroidism    Increased frequency of urination    Inflammatory polyarthropathy (HCC)    Seronegative rheumatoid arthritis (HCC)    Insomnia    Low back pain    Osteoarthritis of wrist    Overactive bladder    Pneumonia    Recurrent urinary tract infection    Selective deficiency of IgG (HCC)    Ulnar neuropathy    Urge incontinence of urine    Severe sepsis (HCC)    JUNIE (acute kidney injury)    Thrombocytopenia    Sepsis (HCC)    Dizziness     SUBJECTIVE:   Ms. Giron is a 77 y/o female with past medical history noted, nonsmoker, known to [facility], with past medical history of CAD s/p stents x4, rheumatoid arthritis, thyroid disease, colon polyps, COVID-19 infection 2022, fungal cavities/ aspergilloma that is here for follow up.       presents to [facility] ED for right-sided chest pain, cough, runny nose for 3  Labs significant for creatinine 1.8, hemoglobin 10.4, platelets 127, alk phos 229  Rapid influenza A/B negative  COVID-negative  CT chest interval development of a small/moderate loculated right pleural effusion with some moderate fluid in the major fissure. Otherwise similar findings as seen previously      pulmonary consulted for right loculated pleural effusion on room air saturating 95%.  Patient seen resting in bed in no acute distress.  Reports right sided chest pain with deep inspiration.  Says she though she had a fractured rib.

## (undated) DEVICE — BRONCHOSCOPY PACK: Brand: MEDLINE INDUSTRIES, INC.

## (undated) DEVICE — SINGLE USE SUCTION VALVE MAJ-209: Brand: SINGLE USE SUCTION VALVE (STERILE)

## (undated) DEVICE — SYRINGE MED 50ML LUERSLIP TIP

## (undated) DEVICE — DOUBLE  SWIVEL ELBOW 15M - DOUBLE FLIP TOP CAP WITH SEAL - 22M/15F: Brand: DOUBLE  SWIVEL ELBOW 15M - DOUBLE FLIP TOP CAP WITH SEAL - 22M/15F

## (undated) DEVICE — OXYMASK ETCO2, 7FT OXYGEN TUBING, 8FT SAMPLING LINE WITH MALE CONNECTOR: Brand: OXYMASK™

## (undated) DEVICE — SINGLE USE BIOPSY VALVE MAJ-210: Brand: SINGLE USE BIOPSY VALVE (STERILE)

## (undated) DEVICE — CONNECTOR TBNG AUX H2O JET DISP FOR OLY 160/180 SER

## (undated) DEVICE — SET EXTN IV L30IN TBNG DIA0.1IN PRIMING 4ML MACBOR FEM ADPT

## (undated) DEVICE — STOPCOCK IV 1 WAY 45PSI HIGH FLOW OFF ROTATION

## (undated) DEVICE — SOLUTION IRRIG 500ML 0.9% SOD CHLO USP POUR PLAS BTL

## (undated) DEVICE — SYRINGE MED 50ML LUERLOCK TIP

## (undated) DEVICE — SOLUTION IRRIG 500ML 0.9% SOD CHL USP POUR PLAS BTL

## (undated) DEVICE — Device

## (undated) DEVICE — GAUZE,SPONGE,POST-OP,4X3,STRL,LF: Brand: MEDLINE

## (undated) DEVICE — CANNULA NSL ORAL AD FOR CAPNOFLEX CO2 O2 AIRLFE

## (undated) DEVICE — Device: Brand: MEDEX

## (undated) DEVICE — AIRWAY PHARYNGEAL AD 9 CM INTUB

## (undated) DEVICE — VALVE SUCTION AIR H2O SET ORCA POD + DISP